# Patient Record
Sex: MALE | Race: BLACK OR AFRICAN AMERICAN | Employment: OTHER | URBAN - METROPOLITAN AREA
[De-identification: names, ages, dates, MRNs, and addresses within clinical notes are randomized per-mention and may not be internally consistent; named-entity substitution may affect disease eponyms.]

---

## 2017-01-19 ENCOUNTER — HOSPITAL ENCOUNTER (OUTPATIENT)
Dept: PHYSICAL THERAPY | Age: 77
Discharge: HOME | End: 2017-01-19
Attending: INTERNAL MEDICINE
Payer: COMMERCIAL

## 2017-01-19 DIAGNOSIS — Z91.81 HISTORY OF FALL: ICD-10-CM

## 2017-01-19 PROCEDURE — G8978 MOBILITY CURRENT STATUS: HCPCS

## 2017-01-19 PROCEDURE — G8979 MOBILITY GOAL STATUS: HCPCS

## 2017-01-19 PROCEDURE — 97163 PT EVAL HIGH COMPLEX 45 MIN: CPT

## 2017-01-19 NOTE — PROGRESS NOTES
Ambulatory/Rehab Services H2 Model Falls Risk Assessment    Risk Factor Pts. ·   Confusion/Disorientation/Impulsivity  [x]    4 ·   Symptomatic Depression  []   2 ·   Altered Elimination  []   1 ·   Dizziness/Vertigo  []   1 ·   Gender (Male)  [x]   1 ·   Any administered antiepileptics (anticonvulsants):  []   2 ·   Any administered benzodiazepines:  []   1 ·   Visual Impairment (specify):  []   1 ·   Portable Oxygen Use  []   1 ·   Orthostatic ? BP  []   1 ·   History of Recent Falls (within 3 mos.)  [x]   5     Ability to Rise from Chair (choose one) Pts. ·   Ability to rise in a single movement  []   0 ·   Pushes up, successful in one attempt  []   1 ·   Multiple attempts, but successful  []   3 ·   Unable to rise without assistance  []   4   Total: (5 or greater = High Risk) 10     Falls Prevention Plan:   []                Physical Limitations to Exercise (specify):   []                Mobility Assistance Device (type):   []                Exercise/Equipment Adaptation (specify):    ©2010 [facility] Patent #[id number].  Federal Law prohibits the replication, distribution or use without written permission from [facility]

## 2017-01-19 NOTE — PROGRESS NOTES
Andrew Bowling. : 1940 [facility] [place] [address] [facility]  [phone]   [phone]          OUTPATIENT PHYSICAL THERAPY:Initial Assessment, Treatment Day: Day of Assessment and AM 2017    ICD-10: Treatment Diagnosis: Repeated falls (R29.6)  Unsteadiness on feet (R26.81)  Unspecified lack of coordination (R27.9)  Precautions/Allergies:   Pcn [penicillins] Nitroglycerin with pt at all times - CP maybe every 2 weeks with strenuous walking or activity. Fall Risk Score: 10 (? 5 = High Risk)  MD Orders: Outpatient PT referral MEDICAL/REFERRING DIAGNOSIS:  History of fall [Z91.81]   DATE OF ONSET: Few months  REFERRING PHYSICIAN: Lerry Gaucher, MD  RETURN PHYSICIAN APPOINTMENT: unknown  DATE OF PROGRESS NOTE:    RECERTIFICATION DATE: 6/10/80     INITIAL ASSESSMENT:  Mr. Laurent Ruffin presents with increased instance of falling, maybe 2 per month. Pt reports a fall when he was leaning over and one going down steps without lights on which is more of an accident vs balance loss but does speak to decreased sensory awareness with vision removed. Pt has multiple medical problems contributing to his deficits such as his diabetes, PAD with pain in legs walking 50 - 100', CAD with ~biweekly chest pain needing Nitroglycerin. 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 Pt show decreased command following and motor processing. Pt's biggest complaint to me are his hands - he states he can be holding something and just drop it without knowing. Pt will benefit from OT consult for this. Pt presents with gait and balance deficit and functional hip weakness. Pt will benefit from PT to maximize ability and safety with mobility    PROBLEM LIST (Impacting functional limitations):  1. Decreased Strength  2. Decreased ADL/Functional Activities  3. Decreased Transfer Abilities  4.  Decreased Ambulation Ability/Technique  5. Decreased Balance  6. Increased Pain  7. Decreased Activity Tolerance  8. Increased Fatigue  9. Increased Shortness of Breath  10. Decreased Flexibility/Joint Mobility  11. Decreased Knowledge of Precautions  12. Decreased [place] with Home Exercise Program  13. Decreased Cognition INTERVENTIONS PLANNED:  1. Balance Exercise  2. Gait Training  3. Home Exercise Program (HEP)  4. Neuromuscular Re-education/Strengthening  5. Range of Motion (ROM)  6. Therapeutic Activites  7. Therapeutic Exercise/Strengthening  8. Transfer Training  9. possible orthotic consult   TREATMENT PLAN:  Effective Dates: 1/19/17 TO 4/14/17. Frequency/Duration: 2 times a week as able for this patient. (He rarely knows his work schedule more than a couple of day in advance and can be needed to drive nearly any day of the week) for 12 weeks  GOALS: (Goals have been discussed and agreed upon with patient.)  Short-Term Functional Goals: Time Frame: 4 weeks  1. Pt will be independent with range of motion, strength and balance home exercise program.  Discharge Goals: Time Frame: 8 weeks  Pt will show increased range of motion and improved posture to allow for improved safety and ability with all functional mobility. Pt will decrease TUG score indicating more normalized gait pattern and decrease risk for falls. Pt will increase Alexis Balance Scale to 48/56 indicating increased balance and decreased risk for falls. Pt will increase Dynamic Gait Index to 19/24 indicating increase gait balance and decreased risk for falls. Pt will be able to ambulate increased community distances with least restrictive assistive device independent and safe as evidenced by increased distance on the 6 minute walk test.    Rehabilitation Potential For Stated Goals: Fair  Regarding Marcello Burns Jr.'s therapy, I certify that the treatment plan above will be carried out by a therapist or under their direction.   Thank you for this referral,  Nolvia Amaya PT     Referring Physician Signature: Prashant James MD              Date                    HISTORY:   GOAL:  \"To get my fingers working better. Like to walk better. Present Symptoms:    Falling 1 - 2 months - Kind of stoop over and put a tag on the car and got right back up. Sometimes I get dizzy when my sugar is low. Check it twice a day. Going down the sairs and I think I tripped. 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 Can drive 4 - 5 hours one way and then come back. PAIN:  Legs sometimes hurt. Now: 0/10. When i walk about 50 - 100' I have to sit and rest 20 minutes for SOB and leg pain - 8/10  History of Present Injury/Illness (Reason for Referral):    Past Medical History/Comorbidities:   Mr. Grayson Justin  has a past medical history of Abdominal distension (11/5/2013); Acute gout (11/5/2013); Anemia; Anxiety (5/1/2013); Anxiety and depression (5/1/2013); BPH (benign prostatic hyperplasia) (11/5/2013); Bronchitis (11/5/2013); CAD (coronary artery disease) (11/5/2013); CAD (coronary artery disease) (11/5/2013); Carcinoma, lung ([facility] [address] (8/10/2015); Chronic kidney disease; CKD (chronic kidney disease) (11/5/2013); Conjunctivitis (11/5/2013); DEMENTIA; Depression; Diabetes ([facility] [facility] Diabetes mellitus ([facility] [address] (5/1/2013); DJD (degenerative joint disease) (11/5/2013); Fatty liver (11/7/2013); Fatty liver (11/7/2013); Gall stone (11/5/2013); GERD (gastroesophageal reflux disease) (11/5/2013); GERD (gastroesophageal reflux disease) (11/5/2013); Hepatomegaly (11/5/2013); Hepatomegaly (11/5/2013); Hepatomegaly (11/5/2013); History of GI tumor (11/5/2013); Hypercholesterolemia; Hypertension; Hypoglycemia (11/5/2013); Hypoglycemia (11/5/2013); Insomnia (11/5/2013); Noncompliance (11/5/2013); [facility]ipheral [facility] (11/5/2013);  Persistent disorder of initiating or maintaining sleep (2/10/2015); Psychiatric disorder; and Urinary frequency (11/5/2013). He also has no past medical history of Asthma; Autoimmune disease ([facility] [address] Chronic obstructive pulmonary disease ([facility] [address] Congestive heart failure, unspecified; COPD; Heart failure ([facility] [facility] Other ill-defined conditions(799.89); PUD (peptic ulcer disease); [facility]izures [facility] Stroke [facility]); Thromboembolus ([facility] or Thyroid disease. Mr. Gaby Montano  has a past surgical history that includes colonoscopy; abdomen surgery proc unlisted; and vascular surgery procedure unlist (Right, 2/3/15). Social History/Living Environment:       Social History     Social History    Marital status:      Spouse name: N/A    Number of children: N/A    Years of education: N/A     Occupational History    Not on file. 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 , Disp: 90 Tab, Rfl: 3    furosemide (LASIX) 40 mg tablet, One daily, Disp: 90 Tab, Rfl: 3    potassium chloride (KLOR-CON M20) 20 mEq tablet, Take 1 Tab by mouth daily. , Disp: 90 Tab, Rfl: 4    buPROPion XL (WELLBUTRIN XL) 300 mg XL tablet, Take 1 Tab by mouth daily. , Disp: 90 Tab, Rfl: 3    doxepin (SINEQUAN) 75 mg capsule, Take 1 Cap by mouth nightly., Disp: 90 Cap, Rfl: 3    Ranolazine 1,000 mg Tb12, Take 1,000 mg by mouth two (2) times a day., Disp: , Rfl:     pantoprazole (PROTONIX) 40 mg tablet, TAKE ONE (1) TABLET(S) ONCE DAILY, Disp: 90 Tab, Rfl: 3    insulin glargine (LANTUS) 100 unit/mL injection, by SubCUTAneous route nightly., Disp: , Rfl:     insulin lispro (HUMALOG) 100 unit/mL injection, by SubCUTAneous route., Disp: , Rfl:     cpap machine kit, by Does Not Apply route. autopap 5-20, Disp: , Rfl:     hydrALAZINE (APRESOLINE) 25 mg tablet, Take 25 mg by mouth three (3) times daily. , Disp: , Rfl:     PROAIR HFA 90 mcg/actuation inhaler, , Disp: , Rfl:     ONE TOUCH ULTRA TEST strip, , Disp: , Rfl:     SPIRIVA WITH HANDIHALER 18 mcg inhalation capsule, , Disp: , Rfl:     losartan (COZAAR) 100 mg tablet, Take 100 mg by mouth daily. , Disp: , Rfl:     NEEDLES, INSULIN DISPOSABLE (BD ULTRA-FINE JOSE PEN NEEDLES), by Does Not Apply route., Disp: , Rfl:     nitroglycerin (NITROSTAT) 0.4 mg SL tablet, by SubLINGual route every five (5) minutes as needed for Chest Pain., Disp: , Rfl:     isosorbide mononitrate ER (IMDUR) 30 mg tablet, Take 120 mg by mouth daily. , Disp: , Rfl:     tamsulosin (FLOMAX) 0.4 mg capsule, Take 0.4 mg by mouth daily. , Disp: , Rfl:     ASPIR-81 81 mg TbEC, take 81 mg by mouth daily. , Disp: , Rfl:    Date Last Reviewed:  1/19/2017     Number of Personal Factors/Comorbidities that affect the Plan of Care: 3+: HIGH COMPLEXITY   EXAMINATION:   ROM:          WNL except some tightness in calves right greater than left  Strength:          Good except decreased power in left DF.     Functional Mobility:         Gait/Ambulation:  Pt ambulates without assistive device and self reports only about 50 - 100' (will try a 6 mintue walk test). Pt shows bilateral heel scuff with small steps (decreased hip and core strength). Transfers:  independent        Bed Mobility:  NT  Mental Status:          Alert and oriented x 4. Occ decreased command following and motor processing. Impulsive. Vision:          Can see to drive without correction in the day. Getting glasses for his night vision. Body Structures Involved:  1. Nerves  2. Heart  3. Lungs  4. Bones  5. Joints  6. Muscles  7. Ligaments Body Functions Affected:  1. Mental  2. Sensory/Pain  3. Cardio  4. Respiratory  5. Neuromusculoskeletal  6. Movement Related Activities and Participation Affected:  1. Learning and Applying Knowledge  2. General Tasks and Demands  3. Mobility  4. Self Care  5. Domestic Life  6. Interpersonal Interactions and Relationships  7. Community, Social and [place]   Number of elements that affect the Plan of Care: 4+: HIGH COMPLEXITY   CLINICAL PRESENTATION:   Presentation: Evolving clinical presentation with unstable and unpredictable characteristics: HIGH COMPLEXITY   CLINICAL DECISION MAKING:   Outcome Measure: Tool Used: 6-Minute Walk Test   Score:  Initial: TBD Feet = TBD Meters  Most Recent: TBD Feet = TBD Meters    Interpretation of Score: AGE  GENDER  MEAN  SD  NORMAL RANGE (2SD)    61-76  Male (15)   Female (22)  572   538  80   80  1-0   [phone]    66-77  Male (14)   Female (22)  527   471  80   69  46-65   [phone]    80-80  Male (8)   Female (15)  275   529  26   77  105-671   [phone]        Tool Used: Karlynn Neve Balance Scale  Score:  Initial: 41/56 Most Recent: X/56 (Date: -- )   Interpretation of Score: Each section is scored on a 0-4 scale, 0 representing the patients inability to perform the task and 4 representing independence. The scores of each section are added together for a total score of 56. The higher the patients score, the more independent the patient is.   Any score below 45 indicates increased risk for falls. Score 56 55-45 44-34 33-23 22-12 11-1 0   Modifier  CI CJ CK CL CM CN     ? Mobility - Walking and Moving Around:     - CURRENT STATUS: CJ - 20%-39% impaired, limited or restricted    - GOAL STATUS: CI - 1%-19% impaired, limited or restricted    - D/C STATUS:  ---------------To be determined---------------    Tool Used: Dynamic Gait Index  Score:  Initial: 16/24 Most Recent: X/24 (Date: -- )   Interpretation of Score: Each section is scored on a 0-3 scale, 0 representing the patients inability to perform the task and 3 representing independence. The scores of each section are added together for a total score of 24. Any score below 19 indicates increased risk for falls. Score 24 23-19 18-15 14-10 9-5 4-1 0   Modifier  CI CJ CK CL CM CN     Tool Used: Timed Up and Go (TUG)  Score:  Initial: 12.65 seconds Most Recent: X seconds (Date: -- )   Interpretation of Score: The test measures, in seconds, the time taken by an individual to stand up from a standard arm chair (seat height 46 cm [18 in], arm height 65 cm [25.6 in]), walk a distance of 3 meters (118 in, approx 10 ft), turn, walk back to the chair and sit down. If the individual takes longer than 14 seconds to complete TUG, this indicates risk for falls. Score 7 7.5-10.5 11-14 14.5-17.5 18-21 21.5-24.5 25+   Modifier  CI CJ CK CL CM CN     Medical Necessity:   · Skilled intervention continues to be required due to [address] [facility]. Reason for Services/Other Comments:  · Patient continues to require skilled intervention due to [address] [address] DEFICIT. Use of outcome tool(s) and clinical judgement create a POC that gives a: Difficult prediction of patient's progress: HIGH COMPLEXITY   TREATMENT:   (In addition to Assessment/Re-Assessment sessions the following treatments were rendered)    ASSESSMENT ONLY    Treatment/Session Assessment:  See initial assessment above.   Patients response to todays treatment session was tolerated well with no medical complications. .  · Post session pain:  NO PAIN  · Compliance with Program/Exercises: Will assess as treatment progresses. · Recommendations/Intent for next treatment session: \"Next visit will focus on Jesus [address] EXERCISEW\".   Total Treatment Duration:  PT Patient Time In/Time Out  Time In: 1000  Time Out: [address], [place]

## 2017-01-20 ENCOUNTER — HOSPITAL ENCOUNTER (OUTPATIENT)
Dept: PHYSICAL THERAPY | Age: 77
Discharge: HOME | End: 2017-01-20
Attending: INTERNAL MEDICINE
Payer: COMMERCIAL

## 2017-01-20 PROCEDURE — 97110 THERAPEUTIC EXERCISES: CPT

## 2017-01-20 NOTE — PROGRESS NOTES
Doree Duty. : [facility] [address]  [phone]   [phone]          OUTPATIENT PHYSICAL THERAPY:Daily Note 2017    ICD-10: Treatment Diagnosis: Repeated falls (R29.6)  Unsteadiness on feet (R26.81)  Unspecified lack of coordination (R27.9)  Precautions/Allergies:   Pcn [penicillins] Nitroglycerin with pt at all times - CP maybe every 2 weeks with strenuous walking or activity. Fall Risk Score: 10 (? 5 = High Risk)  MD Orders: Outpatient PT referral MEDICAL/REFERRING DIAGNOSIS:  history of falls   DATE OF ONSET: Few months  REFERRING PHYSICIAN: Lucio Foreman MD  RETURN PHYSICIAN APPOINTMENT: unknown  DATE OF PROGRESS NOTE:    RECERTIFICATION DATE:      INITIAL ASSESSMENT:  Mr. Clarice Heard presents with increased instance of falling, maybe 2 per month. Pt reports a fall when he was leaning over and one going down steps without lights on which is more of an accident vs balance loss but does speak to decreased sensory awareness with vision removed. Pt has multiple medical problems contributing to his deficits such as his diabetes, PAD with pain in legs walking 50 - 100', CAD with ~biweekly chest pain needing Nitroglycerin. 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 Pt show decreased command following and motor processing. Pt's biggest complaint to me are his hands - he states he can be holding something and just drop it without knowing. Pt will benefit from OT consult for this. Pt presents with gait and balance deficit and functional hip weakness. Pt will benefit from PT to maximize ability and safety with mobility    PROBLEM LIST (Impacting functional limitations):  1. Decreased Strength  2. Decreased ADL/Functional Activities  3. Decreased Transfer Abilities  4. Decreased Ambulation Ability/Technique  5.  Decreased Balance  6. Increased Pain  7. Decreased Activity Tolerance  8. Increased Fatigue  9. Increased Shortness of Breath  10. Decreased Flexibility/Joint Mobility  11. Decreased Knowledge of Precautions  12. Decreased Happy with Home Exercise Program  13. Decreased Cognition INTERVENTIONS PLANNED:  1. Balance Exercise  2. Gait Training  3. Home Exercise Program (HEP)  4. Neuromuscular Re-education/Strengthening  5. Range of Motion (ROM)  6. Therapeutic Activites  7. Therapeutic Exercise/Strengthening  8. Transfer Training  9. possible orthotic consult   TREATMENT PLAN:  Effective Dates: 1/19/17 TO 4/14/17. Frequency/Duration: 2 times a week as able for this patient. (He rarely knows his work schedule more than a couple of day in advance and can be needed to drive nearly any day of the week) for 12 weeks  GOALS: (Goals have been discussed and agreed upon with patient.)  Short-Term Functional Goals: Time Frame: 4 weeks  1. Pt will be independent with range of motion, strength and balance home exercise program.  Discharge Goals: Time Frame: 8 weeks  Pt will show increased range of motion and improved posture to allow for improved safety and ability with all functional mobility. Pt will decrease TUG score indicating more normalized gait pattern and decrease risk for falls. Pt will increase Alexis Balance Scale to 48/56 indicating increased balance and decreased risk for falls. Pt will increase Dynamic Gait Index to 19/24 indicating increase gait balance and decreased risk for falls. Pt will be able to ambulate increased community distances with least restrictive assistive device independent and safe as evidenced by increased distance on the 6 minute walk test.    Rehabilitation Potential For Stated Goals: Fair  Regarding Elsie Garcia Jr.'s therapy, I certify that the treatment plan above will be carried out by a therapist or under their direction.   Thank you for this referral,  China Jordan DPT Referring Physician Signature: Isabel Oliver MD              Date                    HISTORY:   GOAL:  \"To get my fingers working better. Like to walk better. Present Symptoms:    Falling 1 - 2 months - Kind of stoop over and put a tag on the car and got right back up. Sometimes I get dizzy when my sugar is low. Check it twice a day. Going down the sairs and I think I tripped. 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 Can drive 4 - 5 hours one way and then come back. History of Present Injury/Illness (Reason for Referral):    Past Medical History/Comorbidities:   Mr. Sudeep Mancilla  has a past medical history of Abdominal distension (11/5/2013); Acute gout (11/5/2013); Anemia; Anxiety (5/1/2013); Anxiety and depression (5/1/2013); BPH (benign prostatic hyperplasia) (11/5/2013); Bronchitis (11/5/2013); CAD (coronary artery disease) (11/5/2013); CAD (coronary artery disease) (11/5/2013); Carcinoma, lung ([facility] (8/10/2015); Chronic kidney disease; CKD (chronic kidney disease) (11/5/2013); Conjunctivitis (11/5/2013); DEMENTIA; Depression; Diabetes ([facility] Diabetes mellitus ([facility] (5/1/2013); DJD (degenerative joint disease) (11/5/2013); Fatty liver (11/7/2013); Fatty liver (11/7/2013); Gall stone (11/5/2013); GERD (gastroesophageal reflux disease) (11/5/2013); GERD (gastroesophageal reflux disease) (11/5/2013); Hepatomegaly (11/5/2013); Hepatomegaly (11/5/2013); Hepatomegaly (11/5/2013); History of GI tumor (11/5/2013); Hypercholesterolemia; Hypertension; Hypoglycemia (11/5/2013); Hypoglycemia (11/5/2013); Insomnia (11/5/2013); Noncompliance (11/5/2013); PAD (peripheral artery disease) ([facility] (11/5/2013); Persistent disorder of initiating or maintaining sleep (2/10/2015); Psychiatric disorder; and Urinary frequency (11/5/2013). He also has no past medical history of Asthma; Autoimmune disease ([facility]  Chronic obstructive pulmonary disease ([facility] Congestive heart failure, unspecified; COPD; Heart failure ([facility] Other ill-defined conditions(799.89); PUD (peptic ulcer disease); [facility]izures ([facility] Stroke [facility]); Thromboembolus ([facility] or Thyroid disease. Mr. Britany Trevino  has a past surgical history that includes colonoscopy; abdomen surgery proc unlisted; and vascular surgery procedure unlist (Right, 2/3/15). Social History/Living Environment:       Social History     Social History    Marital status:      Spouse name: N/A    Number of children: N/A    Years of education: N/A     Occupational History    Not on file. 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 , Disp: 90 Tab, Rfl: 3    furosemide (LASIX) 40 mg tablet, One daily, Disp: 90 Tab, Rfl: 3    potassium chloride (KLOR-CON M20) 20 mEq tablet, Take 1 Tab by mouth daily. , Disp: 90 Tab, Rfl: 4    buPROPion XL (WELLBUTRIN XL) 300 mg XL tablet, Take 1 Tab by mouth daily. , Disp: 90 Tab, Rfl: 3    doxepin (SINEQUAN) 75 mg capsule, Take 1 Cap by mouth nightly., Disp: 90 Cap, Rfl: 3    Ranolazine 1,000 mg Tb12, Take 1,000 mg by mouth two (2) times a day., Disp: , Rfl:     pantoprazole (PROTONIX) 40 mg tablet, TAKE ONE (1) TABLET(S) ONCE DAILY, Disp: 90 Tab, Rfl: 3    insulin glargine (LANTUS) 100 unit/mL injection, by SubCUTAneous route nightly., Disp: , Rfl:     insulin lispro (HUMALOG) 100 unit/mL injection, by SubCUTAneous route., Disp: , Rfl:     cpap machine kit, by Does Not Apply route. autopap 5-20, Disp: , Rfl:     hydrALAZINE (APRESOLINE) 25 mg tablet, Take 25 mg by mouth three (3) times daily. , Disp: , Rfl:     PROAIR HFA 90 mcg/actuation inhaler, , Disp: , Rfl:     ONE TOUCH ULTRA TEST strip, , Disp: , Rfl:     SPIRIVA WITH HANDIHALER 18 mcg inhalation capsule, , Disp: , Rfl:     losartan (COZAAR) 100 mg tablet, Take 100 mg by mouth daily. , Disp: , Rfl:     NEEDLES, INSULIN DISPOSABLE (BD ULTRA-FINE JOSE PEN NEEDLES), by Does Not Apply route., Disp: , Rfl:     nitroglycerin (NITROSTAT) 0.4 mg SL tablet, by SubLINGual route every five (5) minutes as needed for Chest Pain., Disp: , Rfl:     isosorbide mononitrate ER (IMDUR) 30 mg tablet, Take 120 mg by mouth daily. , Disp: , Rfl:     tamsulosin (FLOMAX) 0.4 mg capsule, Take 0.4 mg by mouth daily. , Disp: , Rfl:     ASPIR-81 81 mg TbEC, take 81 mg by mouth daily. , Disp: , Rfl:    Date Last Reviewed:  1/20/2017     Number of Personal Factors/Comorbidities that affect the Plan of Care: 3+: HIGH COMPLEXITY   EXAMINATION:   ROM:          WNL except some tightness in calves right greater than left  Strength:          Good except decreased power in left DF. Functional Mobility:         Gait/Ambulation:  Pt ambulates without assistive device and self reports only about 50 - 100' (will try a 6 mintue walk test). Pt shows bilateral heel scuff with small steps (decreased hip and core strength).         Transfers: independent        Bed Mobility:  NT  Mental Status:          Alert and oriented x 4. Occ decreased command following and motor processing. Impulsive. Vision:          Can see to drive without correction in the day. Getting glasses for his night vision. Body Structures Involved:  1. Nerves  2. Heart  3. Lungs  4. Bones  5. Joints  6. Muscles  7. Ligaments Body Functions Affected:  1. Mental  2. Sensory/Pain  3. Cardio  4. Respiratory  5. Neuromusculoskeletal  6. Movement Related Activities and Participation Affected:  1. Learning and Applying Knowledge  2. General Tasks and Demands  3. Mobility  4. Self Care  5. Domestic Life  6. Interpersonal Interactions and Relationships  7. Community, Social and [place]   Number of elements that affect the Plan of Care: 4+: HIGH COMPLEXITY   CLINICAL PRESENTATION:   Presentation: Evolving clinical presentation with unstable and unpredictable characteristics: HIGH COMPLEXITY   CLINICAL DECISION MAKING:   Outcome Measure: Tool Used: 6-Minute Walk Test   Score:  Initial: 495 Feet = TBD Meters  3 seated rest breaks  Most Recent: TBD Feet = TBD Meters    Interpretation of Score: AGE  GENDER  MEAN  SD  NORMAL RANGE (2SD)    61-76  Male (15)   Female (22)  572   538  80   80  1-0   [phone]    66-77  Male (14)   Female (22)  527   471  80   69  46-65   [phone]    80-80  Male (8)   Female (15)  853   985  39   77  [phone]   [id number]        Tool Used: Di Russian Balance Scale  Score:  Initial: 41/56 Most Recent: X/56 (Date: -- )   Interpretation of Score: Each section is scored on a 0-4 scale, 0 representing the patients inability to perform the task and 4 representing independence. The scores of each section are added together for a total score of 56. The higher the patients score, the more independent the patient is. Any score below 45 indicates increased risk for falls. Score 56 55-45 44-34 33-23 22-12 11-1 0   Modifier CH CI CJ CK CL CM CN     ?  Mobility - Walking and Moving Around:     - CURRENT STATUS: CJ - 20%-39% impaired, limited or restricted    - GOAL STATUS: CI - 1%-19% impaired, limited or restricted    - D/C STATUS:  ---------------To be determined---------------    Tool Used: Dynamic Gait Index  Score:  Initial: 16/24 Most Recent: X/24 (Date: -- )   Interpretation of Score: Each section is scored on a 0-3 scale, 0 representing the patients inability to perform the task and 3 representing independence. The scores of each section are added together for a total score of 24. Any score below 19 indicates increased risk for falls. Score 24 23-19 18-15 14-10 9-5 4-1 0   Modifier  CI CJ CK CL CM CN     Tool Used: Timed Up and Go (TUG)  Score:  Initial: 12.65 seconds Most Recent: X seconds (Date: -- )   Interpretation of Score: The test measures, in seconds, the time taken by an individual to stand up from a standard arm chair (seat height 46 cm [18 in], arm height 65 cm [25.6 in]), walk a distance of 3 meters (118 in, approx 10 ft), turn, walk back to the chair and sit down. If the individual takes longer than 14 seconds to complete TUG, this indicates risk for falls. Score 7 7.5-10.5 11-14 14.5-17.5 18-21 21.5-24.5 25+   Modifier  CI CJ CK CL CM CN     Medical Necessity:   · Skilled intervention continues to be required due to [facility]. Reason for Services/Other Comments:  · Patient continues to require skilled intervention due to [id number] [address] DEFICIT. Use of outcome tool(s) and clinical judgement create a POC that gives a: Difficult prediction of patient's progress: HIGH COMPLEXITY   TREATMENT:   (In addition to Assessment/Re-Assessment sessions the following treatments were rendered)    Therapeutic Exercise: ( 40 minutes):  Exercises per grid below to improve mobility, strength and balance.   Required moderate visual, verbal and tactile cues to promote proper body alignment and promote proper body mechanics. Progressed complexity of movement as indicated. 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 No pain. Treatment/Session Assessment:  Patient requires lots of verbal and tactile cues to perform exercises correctly. Frequent rest breaks needed. He continues to benefit from skilled PT to improve his mobility and balance. Patients response to todays treatment session was tolerated well with no medical complications. .  · Post session pain:  NO PAIN  · Compliance with Program/Exercises: Will assess as treatment progresses. · Recommendations/Intent for next treatment session: \"Next visit will focus on [address] EXERCISEW\".   Total Treatment Duration:  PT Patient Time In/Time Out  Time In: 1430  Time Out: [id number], DPT

## 2017-01-26 ENCOUNTER — HOSPITAL ENCOUNTER (OUTPATIENT)
Dept: PHYSICAL THERAPY | Age: 77
Discharge: HOME | End: 2017-01-26
Attending: INTERNAL MEDICINE
Payer: COMMERCIAL

## 2017-01-26 PROCEDURE — 97110 THERAPEUTIC EXERCISES: CPT

## 2017-01-26 NOTE — PROGRESS NOTES
Joslynzoie Colon. : 1940 [facility] [address] [address] [facility]  [phone]   [phone]          OUTPATIENT PHYSICAL THERAPY:Daily Note 2017    ICD-10: Treatment Diagnosis: Repeated falls (R29.6)  Unsteadiness on feet (R26.81)  Unspecified lack of coordination (R27.9)  Precautions/Allergies:   Pcn [penicillins] Nitroglycerin with pt at all times - CP maybe every 2 weeks with strenuous walking or activity. Fall Risk Score: 10 (? 5 = High Risk)  MD Orders: Outpatient PT referral MEDICAL/REFERRING DIAGNOSIS:  history of falls   DATE OF ONSET: Few months  REFERRING PHYSICIAN: Bethany Schumacher MD  RETURN PHYSICIAN APPOINTMENT: unknown  DATE OF PROGRESS NOTE:    RECERTIFICATION DATE:      INITIAL ASSESSMENT:  Mr. Antony Class presents with increased instance of falling, maybe 2 per month. Pt reports a fall when he was leaning over and one going down steps without lights on which is more of an accident vs balance loss but does speak to decreased sensory awareness with vision removed. Pt has multiple medical problems contributing to his deficits such as his diabetes, PAD with pain in legs walking 50 - 100', CAD with ~biweekly chest pain needing Nitroglycerin. 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 Pt show decreased command following and motor processing. Pt's biggest complaint to me are his hands - he states he can be holding something and just drop it without knowing. Pt will benefit from OT consult for this. Pt presents with gait and balance deficit and functional hip weakness. Pt will benefit from PT to maximize ability and safety with mobility    PROBLEM LIST (Impacting functional limitations):  1. Decreased Strength  2. Decreased ADL/Functional Activities  3. Decreased Transfer Abilities  4. Decreased Ambulation Ability/Technique  5.  Decreased Balance  6. Increased Pain  7. Decreased Activity Tolerance  8. Increased Fatigue  9. Increased Shortness of Breath  10. Decreased Flexibility/Joint Mobility  11. Decreased Knowledge of Precautions  12. Decreased [place] with Home Exercise Program  13. Decreased Cognition INTERVENTIONS PLANNED:  1. Balance Exercise  2. Gait Training  3. Home Exercise Program (HEP)  4. Neuromuscular Re-education/Strengthening  5. Range of Motion (ROM)  6. Therapeutic Activites  7. Therapeutic Exercise/Strengthening  8. Transfer Training  9. possible orthotic consult   TREATMENT PLAN:  Effective Dates: 1/19/17 TO 4/14/17. Frequency/Duration: 2 times a week as able for this patient. (He rarely knows his work schedule more than a couple of day in advance and can be needed to drive nearly any day of the week) for 12 weeks  GOALS: (Goals have been discussed and agreed upon with patient.)  Short-Term Functional Goals: Time Frame: 4 weeks  1. Pt will be independent with range of motion, strength and balance home exercise program.  Discharge Goals: Time Frame: 8 weeks  Pt will show increased range of motion and improved posture to allow for improved safety and ability with all functional mobility. Pt will decrease TUG score indicating more normalized gait pattern and decrease risk for falls. Pt will increase Alexis Balance Scale to 48/56 indicating increased balance and decreased risk for falls. Pt will increase Dynamic Gait Index to 19/24 indicating increase gait balance and decreased risk for falls. Pt will be able to ambulate increased community distances with least restrictive assistive device independent and safe as evidenced by increased distance on the 6 minute walk test.    Rehabilitation Potential For Stated Goals: Fair  Regarding Jerman Ferrer Jr.'s therapy, I certify that the treatment plan above will be carried out by a therapist or under their direction.   Thank you for this referral,  Silvina Hatfield, PT Referring Physician Signature: Humera Schneider MD              Date                    HISTORY:   GOAL:  \"To get my fingers working better. Like to walk better. Present Symptoms:    Falling 1 - 2 months - Kind of stoop over and put a tag on the car and got right back up. Sometimes I get dizzy when my sugar is low. Check it twice a day. Going down the sairs and I think I tripped. 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 Can drive 4 - 5 hours one way and then come back. History of Present Injury/Illness (Reason for Referral):    Past Medical History/Comorbidities:   Mr. Tiara Caballero  has a past medical history of Abdominal distension (11/5/2013); Acute gout (11/5/2013); Anemia; Anxiety (5/1/2013); Anxiety and depression (5/1/2013); BPH (benign prostatic hyperplasia) (11/5/2013); Bronchitis (11/5/2013); CAD (coronary artery disease) (11/5/2013); CAD (coronary artery disease) (11/5/2013); Carcinoma, lung ([facility] (8/10/2015); Chronic kidney disease; CKD (chronic kidney disease) (11/5/2013); Conjunctivitis (11/5/2013); DEMENTIA; Depression; Diabetes ([facility] Diabetes mellitus ([facility] (5/1/2013); DJD (degenerative joint disease) (11/5/2013); Fatty liver (11/7/2013); Fatty liver (11/7/2013); Gall stone (11/5/2013); GERD (gastroesophageal reflux disease) (11/5/2013); GERD (gastroesophageal reflux disease) (11/5/2013); Hepatomegaly (11/5/2013); Hepatomegaly (11/5/2013); Hepatomegaly (11/5/2013); History of GI tumor (11/5/2013); Hypercholesterolemia; Hypertension; Hypoglycemia (11/5/2013); Hypoglycemia (11/5/2013); Insomnia (11/5/2013); Noncompliance (11/5/2013); PAD (peripheral artery disease) ([facility] (11/5/2013); Persistent disorder of initiating or maintaining sleep (2/10/2015); Psychiatric disorder; and Urinary frequency (11/5/2013). He also has no past medical history of Asthma; Autoimmune disease ([facility]  Chronic obstructive pulmonary disease ([facility] [facility] Congestive heart failure, unspecified; COPD; Heart failure ([facility] Other ill-defined conditions(799.89); PUD (peptic ulcer disease); [facility] Stroke [facility]); Thromboembolus ([facility] or Thyroid disease. Mr. Gertrude Guy  has a past surgical history that includes colonoscopy; abdomen surgery proc unlisted; and vascular surgery procedure unlist (Right, 2/3/15). Social History/Living Environment:       Social History     Social History    Marital status:      Spouse name: N/A    Number of children: N/A    Years of education: N/A     Occupational History    Not on file. 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 , Disp: 90 Tab, Rfl: 3    furosemide (LASIX) 40 mg tablet, One daily, Disp: 90 Tab, Rfl: 3    potassium chloride (KLOR-CON M20) 20 mEq tablet, Take 1 Tab by mouth daily. , Disp: 90 Tab, Rfl: 4    buPROPion XL (WELLBUTRIN XL) 300 mg XL tablet, Take 1 Tab by mouth daily. , Disp: 90 Tab, Rfl: 3    doxepin (SINEQUAN) 75 mg capsule, Take 1 Cap by mouth nightly., Disp: 90 Cap, Rfl: 3    Ranolazine 1,000 mg Tb12, Take 1,000 mg by mouth two (2) times a day., Disp: , Rfl:     pantoprazole (PROTONIX) 40 mg tablet, TAKE ONE (1) TABLET(S) ONCE DAILY, Disp: 90 Tab, Rfl: 3    insulin glargine (LANTUS) 100 unit/mL injection, by SubCUTAneous route nightly., Disp: , Rfl:     insulin lispro (HUMALOG) 100 unit/mL injection, by SubCUTAneous route., Disp: , Rfl:     cpap machine kit, by Does Not Apply route. autopap 5-20, Disp: , Rfl:     hydrALAZINE (APRESOLINE) 25 mg tablet, Take 25 mg by mouth three (3) times daily. , Disp: , Rfl:     PROAIR HFA 90 mcg/actuation inhaler, , Disp: , Rfl:     ONE TOUCH ULTRA TEST strip, , Disp: , Rfl:     SPIRIVA WITH HANDIHALER 18 mcg inhalation capsule, , Disp: , Rfl:     losartan (COZAAR) 100 mg tablet, Take 100 mg by mouth daily. , Disp: , Rfl:     NEEDLES, INSULIN DISPOSABLE (BD ULTRA-FINE JOSE PEN NEEDLES), by Does Not Apply route., Disp: , Rfl:     nitroglycerin (NITROSTAT) 0.4 mg SL tablet, by SubLINGual route every five (5) minutes as needed for Chest Pain., Disp: , Rfl:     isosorbide mononitrate ER (IMDUR) 30 mg tablet, Take 120 mg by mouth daily. , Disp: , Rfl:     tamsulosin (FLOMAX) 0.4 mg capsule, Take 0.4 mg by mouth daily. , Disp: , Rfl:     ASPIR-81 81 mg TbEC, take 81 mg by mouth daily. , Disp: , Rfl:    Date Last Reviewed:  1/26/2017     Number of Personal Factors/Comorbidities that affect the Plan of Care: 3+: HIGH COMPLEXITY   EXAMINATION:   ROM:          WNL except some tightness in calves right greater than left  Strength:          Good except decreased power in left DF. Functional Mobility:         Gait/Ambulation:  Pt ambulates without assistive device and self reports only about 50 - 100' (will try a 6 mintue walk test). Pt shows bilateral heel scuff with small steps (decreased hip and core strength).         Transfers: independent        Bed Mobility:  NT  Mental Status:          Alert and oriented x 4. Occ decreased command following and motor processing. Impulsive. Vision:          Can see to drive without correction in the day. Getting glasses for his night vision. Body Structures Involved:  1. Nerves  2. Heart  3. Lungs  4. Bones  5. Joints  6. Muscles  7. Ligaments Body Functions Affected:  1. Mental  2. Sensory/Pain  3. Cardio  4. Respiratory  5. Neuromusculoskeletal  6. Movement Related Activities and Participation Affected:  1. Learning and Applying Knowledge  2. General Tasks and Demands  3. Mobility  4. Self Care  5. Domestic Life  6. Interpersonal Interactions and Relationships  7. Community, Social and [place]   Number of elements that affect the Plan of Care: 4+: HIGH COMPLEXITY   CLINICAL PRESENTATION:   Presentation: Evolving clinical presentation with unstable and unpredictable characteristics: HIGH COMPLEXITY   CLINICAL DECISION MAKING:   Outcome Measure: Tool Used: 6-Minute Walk Test   Score:  Initial: 495 Feet = TBD Meters  3 seated rest breaks  Most Recent: TBD Feet = TBD Meters    Interpretation of Score: AGE  GENDER  MEAN  SD  NORMAL RANGE (2SD)    61-76  Male (15)   Female (22)  572   538  80   80  1-0   [phone]    66-77  Male (14)   Female (22)  527   471  80   69  46-65   321-621    80-80  Male (8)   Female (15)  489   514  62   12  218-914   222-562        Tool Used: Celia Gianni Balance Scale  Score:  Initial: 41/56 Most Recent: X/56 (Date: -- )   Interpretation of Score: Each section is scored on a 0-4 scale, 0 representing the patients inability to perform the task and 4 representing independence. The scores of each section are added together for a total score of 56. The higher the patients score, the more independent the patient is. Any score below 45 indicates increased risk for falls. Score 56 55-45 44-34 33-23 22-12 11-1 0   Modifier CH CI CJ CK CL CM CN     ?  Mobility - Walking and Moving Around:     - CURRENT STATUS: CJ - 20%-39% impaired, limited or restricted    - GOAL STATUS: CI - 1%-19% impaired, limited or restricted    - D/C STATUS:  ---------------To be determined---------------    Tool Used: Dynamic Gait Index  Score:  Initial: 16/24 Most Recent: X/24 (Date: -- )   Interpretation of Score: Each section is scored on a 0-3 scale, 0 representing the patients inability to perform the task and 3 representing independence. The scores of each section are added together for a total score of 24. Any score below 19 indicates increased risk for falls. Score 24 23-19 18-15 14-10 9-5 4-1 0   Modifier CH CI CJ CK CL CM CN     Tool Used: Timed Up and Go (TUG)  Score:  Initial: 12.65 seconds Most Recent: X seconds (Date: -- )   Interpretation of Score: The test measures, in seconds, the time taken by an individual to stand up from a standard arm chair (seat height 46 cm [18 in], arm height 65 cm [25.6 in]), walk a distance of 3 meters (118 in, approx 10 ft), turn, walk back to the chair and sit down. If the individual takes longer than 14 seconds to complete TUG, this indicates risk for falls. Score 7 7.5-10.5 11-14 14.5-17.5 18-21 21.5-24.5 25+   Modifier CH CI CJ CK CL CM CN     Medical Necessity:   · Skilled intervention continues to be required due to [facility]. Reason for Services/Other Comments:  · Patient continues to require skilled intervention due to [id number] [address] DEFICIT. Use of outcome tool(s) and clinical judgement create a POC that gives a: Difficult prediction of patient's progress: HIGH COMPLEXITY      S:  Well I haven't done them. I have been out of town. I feel a lot better. I can walk better. \"      TREATMENT:   (In addition to Assessment/Re-Assessment sessions the following treatments were rendered)    Therapeutic Exercise: ( 40 minutes):  Exercises per grid below to improve mobility, strength and balance.   Required moderate visual, verbal and tactile cues to promote proper body alignment and promote proper body mechanics. Progressed complexity of movement as indicated. Date:  1/19/17 Date:  1/26/17   Date:     Activity/Exercise Parameters Parameters Parameters   Heel raises 3 x 10 reps - patient bending knees and then pushing up into heel raise despite cuing  Standing heel toe raises - at rail x 20    Toe raises 2 x 10 reps with bouncing  Above    Standing lateral foot raises  X20. Good ability    Hip circles 2 x 10 reps each direction  Very difficulty. Pt rotating at hips and ankles vs full [place] around feet. 10x each directions. Seated ankle pumps X 20 reps  x20     Lateral ankle rocking  2 x 10 reps  As above    Standing hip extension  2 x 10' - very poor form with visual, verbal, tactile cues  2 x 10. Instructed to perform like a leg swing and to keep the knee straight. Better form    Standing hip abduction  2 x 10. Excellent on the right. Left uncoordinated. Calf stretch on incline board 2 x 30 sec hold  3 x 30 seconds      Pre-Treatment Symptoms: Just got back this morning. No pain. Treatment/Session Assessment:  Stated his legs and feet feel better after therapy. Following commands better today with good technique overall. Steadier gait upon entering therapy. He continues to benefit from skilled PT to improve his mobility and balance. Patients response to todays treatment session was tolerated well with no medical complications. .  · Post session pain:  NO PAIN  · Compliance with Program/Exercises: Will assess as treatment progresses. · Recommendations/Intent for next treatment session: \"Next visit will focus on progressive high level balance and LE sensorimotor awareness stretches and exercises. \".   Total Treatment Duration:  PT Patient Time In/Time Out  Time In: 1400  Time Out: [address], PT

## 2017-01-30 ENCOUNTER — HOSPITAL ENCOUNTER (OUTPATIENT)
Dept: PHYSICAL THERAPY | Age: 77
Discharge: HOME | End: 2017-01-30
Attending: INTERNAL MEDICINE
Payer: COMMERCIAL

## 2017-01-30 PROCEDURE — 97110 THERAPEUTIC EXERCISES: CPT

## 2017-01-30 NOTE — PROGRESS NOTES
Patricia Connors. : [facility] [address]  [phone]   [phone]          OUTPATIENT PHYSICAL THERAPY:Daily Note 2017    ICD-10: Treatment Diagnosis: Repeated falls (R29.6)  Unsteadiness on feet (R26.81)  Unspecified lack of coordination (R27.9)  Precautions/Allergies:   Pcn [penicillins] Nitroglycerin with pt at all times - CP maybe every 2 weeks with strenuous walking or activity. Fall Risk Score: 10 (? 5 = High Risk)  MD Orders: Outpatient PT referral MEDICAL/REFERRING DIAGNOSIS:  history of falls   DATE OF ONSET: Few months  REFERRING PHYSICIAN: Jarocho Lazcano MD  RETURN PHYSICIAN APPOINTMENT: unknown  DATE OF PROGRESS NOTE:  14  RECERTIFICATION DATE: 88     INITIAL ASSESSMENT:  Mr. Joella Osler presents with increased instance of falling, maybe 2 per month. Pt reports a fall when he was leaning over and one going down steps without lights on which is more of an accident vs balance loss but does speak to decreased sensory awareness with vision removed. Pt has multiple medical problems contributing to his deficits such as his diabetes, PAD with pain in legs walking 50 - 100', CAD with ~biweekly chest pain needing Nitroglycerin. 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 Pt show decreased command following and motor processing. Pt's biggest complaint to me are his hands - he states he can be holding something and just drop it without knowing. Pt will benefit from OT consult for this. Pt presents with gait and balance deficit and functional hip weakness. Pt will benefit from PT to maximize ability and safety with mobility    PROBLEM LIST (Impacting functional limitations):  1. Decreased Strength  2. Decreased ADL/Functional Activities  3. Decreased Transfer Abilities  4. Decreased Ambulation Ability/Technique  5.  Decreased Balance  6. Increased Pain  7. Decreased Activity Tolerance  8. Increased Fatigue  9. Increased Shortness of Breath  10. Decreased Flexibility/Joint Mobility  11. Decreased Knowledge of Precautions  12. Decreased Haakon with Home Exercise Program  13. Decreased Cognition INTERVENTIONS PLANNED:  1. Balance Exercise  2. Gait Training  3. Home Exercise Program (HEP)  4. Neuromuscular Re-education/Strengthening  5. Range of Motion (ROM)  6. Therapeutic Activites  7. Therapeutic Exercise/Strengthening  8. Transfer Training  9. possible orthotic consult   TREATMENT PLAN:  Effective Dates: 1/19/17 TO 4/14/17. Frequency/Duration: 2 times a week as able for this patient. (He rarely knows his work schedule more than a couple of day in advance and can be needed to drive nearly any day of the week) for 12 weeks  GOALS: (Goals have been discussed and agreed upon with patient.)  Short-Term Functional Goals: Time Frame: 4 weeks  1. Pt will be independent with range of motion, strength and balance home exercise program.  Discharge Goals: Time Frame: 8 weeks  Pt will show increased range of motion and improved posture to allow for improved safety and ability with all functional mobility. Pt will decrease TUG score indicating more normalized gait pattern and decrease risk for falls. Pt will increase Alexis Balance Scale to 48/56 indicating increased balance and decreased risk for falls. Pt will increase Dynamic Gait Index to 19/24 indicating increase gait balance and decreased risk for falls. Pt will be able to ambulate increased community distances with least restrictive assistive device independent and safe as evidenced by increased distance on the 6 minute walk test.    Rehabilitation Potential For Stated Goals: Fair  Regarding Jorge A Price Jr.'s therapy, I certify that the treatment plan above will be carried out by a therapist or under their direction.   Thank you for this referral,  Kolton Nelson, PT Referring Physician Signature: Minnie Koyanagi, MD              Date                    HISTORY:   GOAL:  \"To get my fingers working better. Like to walk better. Present Symptoms:    Falling 1 - 2 months - Kind of stoop over and put a tag on the car and got right back up. Sometimes I get dizzy when my sugar is low. Check it twice a day. Going down the sairs and I think I tripped. 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 Can drive 4 - 5 hours one way and then come back. History of Present Injury/Illness (Reason for Referral):    Past Medical History/Comorbidities:   Mr. Criss Ha  has a past medical history of Abdominal distension (11/5/2013); Acute gout (11/5/2013); Anemia; Anxiety (5/1/2013); Anxiety and depression (5/1/2013); BPH (benign prostatic hyperplasia) (11/5/2013); Bronchitis (11/5/2013); CAD (coronary artery disease) (11/5/2013); CAD (coronary artery disease) (11/5/2013); Carcinoma, lung ([facility] (8/10/2015); Chronic kidney disease; CKD (chronic kidney disease) (11/5/2013); Conjunctivitis (11/5/2013); DEMENTIA; Depression; Diabetes ([address] Diabetes mellitus ([facility] (5/1/2013); DJD (degenerative joint disease) (11/5/2013); Fatty liver (11/7/2013); Fatty liver (11/7/2013); Gall stone (11/5/2013); GERD (gastroesophageal reflux disease) (11/5/2013); GERD (gastroesophageal reflux disease) (11/5/2013); Hepatomegaly (11/5/2013); Hepatomegaly (11/5/2013); Hepatomegaly (11/5/2013); History of GI tumor (11/5/2013); Hypercholesterolemia; Hypertension; Hypoglycemia (11/5/2013); Hypoglycemia (11/5/2013); Insomnia (11/5/2013); Noncompliance (11/5/2013); PAD (peripheral artery disease) ([address] (11/5/2013); Persistent disorder of initiating or maintaining sleep (2/10/2015); Psychiatric disorder; and Urinary frequency (11/5/2013). He also has no past medical history of Asthma; Autoimmune disease ([facility]  Chronic obstructive pulmonary disease ([facility] Congestive heart failure, unspecified; COPD; Heart failure ([facility] Other ill-defined conditions(799.89); PUD (peptic ulcer disease); Seizures ([facility] Stroke [facility]); Thromboembolus ([facility] or Thyroid disease. Mr. Mary Carmen Cano  has a past surgical history that includes colonoscopy; abdomen surgery proc unlisted; and vascular surgery procedure unlist (Right, 2/3/15). Social History/Living Environment:       Social History     Social History    Marital status:      Spouse name: N/A    Number of children: N/A    Years of education: N/A     Occupational History    Not on file. 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 , Disp: 90 Tab, Rfl: 3    furosemide (LASIX) 40 mg tablet, One daily, Disp: 90 Tab, Rfl: 3    potassium chloride (KLOR-CON M20) 20 mEq tablet, Take 1 Tab by mouth daily. , Disp: 90 Tab, Rfl: 4    buPROPion XL (WELLBUTRIN XL) 300 mg XL tablet, Take 1 Tab by mouth daily. , Disp: 90 Tab, Rfl: 3    doxepin (SINEQUAN) 75 mg capsule, Take 1 Cap by mouth nightly., Disp: 90 Cap, Rfl: 3    Ranolazine 1,000 mg Tb12, Take 1,000 mg by mouth two (2) times a day., Disp: , Rfl:     pantoprazole (PROTONIX) 40 mg tablet, TAKE ONE (1) TABLET(S) ONCE DAILY, Disp: 90 Tab, Rfl: 3    insulin glargine (LANTUS) 100 unit/mL injection, by SubCUTAneous route nightly., Disp: , Rfl:     insulin lispro (HUMALOG) 100 unit/mL injection, by SubCUTAneous route., Disp: , Rfl:     cpap machine kit, by Does Not Apply route. autopap 5-20, Disp: , Rfl:     hydrALAZINE (APRESOLINE) 25 mg tablet, Take 25 mg by mouth three (3) times daily. , Disp: , Rfl:     PROAIR HFA 90 mcg/actuation inhaler, , Disp: , Rfl:     ONE TOUCH ULTRA TEST strip, , Disp: , Rfl:     SPIRIVA WITH HANDIHALER 18 mcg inhalation capsule, , Disp: , Rfl:     losartan (COZAAR) 100 mg tablet, Take 100 mg by mouth daily. , Disp: , Rfl:     NEEDLES, INSULIN DISPOSABLE (BD ULTRA-FINE JOSE PEN NEEDLES), by Does Not Apply route., Disp: , Rfl:     nitroglycerin (NITROSTAT) 0.4 mg SL tablet, by SubLINGual route every five (5) minutes as needed for Chest Pain., Disp: , Rfl:     isosorbide mononitrate ER (IMDUR) 30 mg tablet, Take 120 mg by mouth daily. , Disp: , Rfl:     tamsulosin (FLOMAX) 0.4 mg capsule, Take 0.4 mg by mouth daily. , Disp: , Rfl:     ASPIR-81 81 mg TbEC, take 81 mg by mouth daily. , Disp: , Rfl:    Date Last Reviewed:  1/30/2017     Number of Personal Factors/Comorbidities that affect the Plan of Care: 3+: HIGH COMPLEXITY   EXAMINATION:   ROM:          WNL except some tightness in calves right greater than left  Strength:          Good except decreased power in left DF. Functional Mobility:         Gait/Ambulation:  Pt ambulates without assistive device and self reports only about 50 - 100' (will try a 6 mintue walk test). Pt shows bilateral heel scuff with small steps (decreased hip and core strength).         Transfers: independent        Bed Mobility:  NT  Mental Status:          Alert and oriented x 4. Occ decreased command following and motor processing. Impulsive. Vision:          Can see to drive without correction in the day. Getting glasses for his night vision. Body Structures Involved:  1. Nerves  2. Heart  3. Lungs  4. Bones  5. Joints  6. Muscles  7. Ligaments Body Functions Affected:  1. Mental  2. Sensory/Pain  3. Cardio  4. Respiratory  5. Neuromusculoskeletal  6. Movement Related Activities and Participation Affected:  1. Learning and Applying Knowledge  2. General Tasks and Demands  3. Mobility  4. Self Care  5. Domestic Life  6. Interpersonal Interactions and Relationships  7. Community, Social and [place]   Number of elements that affect the Plan of Care: 4+: HIGH COMPLEXITY   CLINICAL PRESENTATION:   Presentation: Evolving clinical presentation with unstable and unpredictable characteristics: HIGH COMPLEXITY   CLINICAL DECISION MAKING:   Outcome Measure: Tool Used: 6-Minute Walk Test   Score:  Initial: 495 Feet = TBD Meters  3 seated rest breaks  Most Recent: TBD Feet = TBD Meters    Interpretation of Score: AGE  GENDER  MEAN  SD  NORMAL RANGE (2SD)    61-76  Male (15)   Female (22)  572   538  80   80  1-0   [phone]    66-77  Male (14)   Female (22)  527   471  80   69  46-65   [phone]    80-80  Male (8)   Female (15)  224   685  36   14  [phone]   [id number]        Tool Used: Nielsen Balance Scale  Score:  Initial: 41/56 Most Recent: X/56 (Date: -- )   Interpretation of Score: Each section is scored on a 0-4 scale, 0 representing the patients inability to perform the task and 4 representing independence. The scores of each section are added together for a total score of 56. The higher the patients score, the more independent the patient is. Any score below 45 indicates increased risk for falls. Score 56 55-45 44-34 33-23 22-12 11-1 0   Modifier CH CI CJ CK CL CM CN     ?  Mobility - Walking and Moving Around:     - CURRENT STATUS: CJ - 20%-39% impaired, limited or restricted    - GOAL STATUS: CI - 1%-19% impaired, limited or restricted    - D/C STATUS:  ---------------To be determined---------------    Tool Used: Dynamic Gait Index  Score:  Initial: 16/24 Most Recent: X/24 (Date: -- )   Interpretation of Score: Each section is scored on a 0-3 scale, 0 representing the patients inability to perform the task and 3 representing independence. The scores of each section are added together for a total score of 24. Any score below 19 indicates increased risk for falls. Score 24 23-19 18-15 14-10 9-5 4-1 0   Modifier  CI CJ CK CL CM CN     Tool Used: Timed Up and Go (TUG)  Score:  Initial: 12.65 seconds Most Recent: X seconds (Date: -- )   Interpretation of Score: The test measures, in seconds, the time taken by an individual to stand up from a standard arm chair (seat height 46 cm [18 in], arm height 65 cm [25.6 in]), walk a distance of 3 meters (118 in, approx 10 ft), turn, walk back to the chair and sit down. If the individual takes longer than 14 seconds to complete TUG, this indicates risk for falls. Score 7 7.5-10.5 11-14 14.5-17.5 18-21 21.5-24.5 25+   Modifier CH CI CJ CK CL CM CN     Medical Necessity:   · Skilled intervention continues to be required due to [facility]. Reason for Services/Other Comments:  · Patient continues to require skilled intervention due to [id number] [address] DEFICIT. Use of outcome tool(s) and clinical judgement create a POC that gives a: Difficult prediction of patient's progress: HIGH COMPLEXITY      S:  \"sorry I am running late. I haven't noticed any changes. No pain unless I walk. \"      TREATMENT:   (In addition to Assessment/Re-Assessment sessions the following treatments were rendered)    Therapeutic Exercise: ( 35 minutes):  Exercises per grid below to improve mobility, strength and balance.   Required moderate visual, verbal and tactile cues to promote proper body alignment and promote proper body mechanics. Progressed complexity of movement as indicated. Before therapy a quick guided meditation with eyes closed of sensations in feet, ankles and calves followed again after therapy. Afterwards pt states legs feel better, warmer with more feeling. Date:  1/19/17 Date:  1/26/17   Date:  1/30/17     Activity/Exercise Parameters Parameters Parameters   Nu Step   Arms at 13  Seat at 13  Level 1  Time:  2:51 minutes legs started hurting. Heel/toe raises 3 x 10 reps - patient bending knees and then pushing up into heel raise despite cuing  Standing heel toe raises - at rail x 20 x20    Toe raises 2 x 10 reps with bouncing  Above -   Standing lateral foot raises  X20. Good ability X20. Cues to [facility] along borders of feet. Hip circles 2 x 10 reps each direction  Very difficulty. Pt rotating at hips and ankles vs full [place] around feet. 10x each directions. Very difficult for pt. Cannot follow directions or visual cueing for this one. Good effort and able to get some decent WSs.  ~10 CW  ~10 CCW   Seated ankle pumps X 20 reps  x20  x20   Lateral ankle rocking  2 x 10 reps  As above As above   Standing hip extension  2 x 10' - very poor form with visual, verbal, tactile cues  2 x 10. Instructed to perform like a leg swing and to keep the knee straight. Better form x20 each. More coordinated. Standing hip abduction  2 x 10. Excellent on the right. Left uncoordinated. x20 each leg. Guiding for the left leg. Single leg stance   2 x 10 on each. Had to sit and rest.    Having pt tap rail to decrease hand hold   Standing forward/back bend   x10 good speed and range. No hand hold. One LOB. Calf stretch on incline board 2 x 30 sec hold  3 x 30 seconds 3 x 20 seconds. Could not follow directions for the stretch. Would not hold still. Took about 5 explanations to understand.      Pre-Treatment Symptoms:  No pain. Treatment/Session Assessment:  Stated he had not noticed any change in general but that his legs and feet feel better after therapy. Much difficulty with some tasks. Steadier gait at end of session vs. upon entering therapy. He continues to benefit from skilled PT to improve his mobility and balance. Patients response to todays treatment session was tolerated well with no medical complications. · Post session pain:  NO PAIN  · Compliance with Program/Exercises: Will assess as treatment progresses. · Recommendations/Intent for next treatment session: \"Next visit will focus on progressive high level balance and LE sensorimotor awareness stretches and exercises. \".   Total Treatment Duration:  PT Patient Time In/Time Out  Time In: 1420  Time Out: Vamsi Alicea PT

## 2017-02-03 ENCOUNTER — HOSPITAL ENCOUNTER (OUTPATIENT)
Dept: PHYSICAL THERAPY | Age: 77
Discharge: HOME | End: 2017-02-03
Attending: INTERNAL MEDICINE
Payer: COMMERCIAL

## 2017-02-03 PROCEDURE — 97110 THERAPEUTIC EXERCISES: CPT

## 2017-02-03 NOTE — PROGRESS NOTES
Huong Milan. : 1940 [facility] [address]  [phone]   [phone]          OUTPATIENT PHYSICAL THERAPY:Daily Note 2/3/2017    ICD-10: Treatment Diagnosis: Repeated falls (R29.6)  Unsteadiness on feet (R26.81)  Unspecified lack of coordination (R27.9)  Precautions/Allergies:   Pcn [penicillins] Nitroglycerin with pt at all times - CP maybe every 2 weeks with strenuous walking or activity. Fall Risk Score: 10 (? 5 = High Risk)  MD Orders: Outpatient PT referral MEDICAL/REFERRING DIAGNOSIS:  history of falls   DATE OF ONSET: Few months  REFERRING PHYSICIAN: Angeline Maldonado MD  RETURN PHYSICIAN APPOINTMENT: unknown  DATE OF PROGRESS NOTE:    RECERTIFICATION DATE: 3/52/76     INITIAL ASSESSMENT:  Mr. Perry Mooney presents with increased instance of falling, maybe 2 per month. Pt reports a fall when he was leaning over and one going down steps without lights on which is more of an accident vs balance loss but does speak to decreased sensory awareness with vision removed. Pt has multiple medical problems contributing to his deficits such as his diabetes, PAD with pain in legs walking 50 - 100', CAD with ~biweekly chest pain needing Nitroglycerin. 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 Pt show decreased command following and motor processing. Pt's biggest complaint to me are his hands - he states he can be holding something and just drop it without knowing. Pt will benefit from OT consult for this. Pt presents with gait and balance deficit and functional hip weakness. Pt will benefit from PT to maximize ability and safety with mobility    PROBLEM LIST (Impacting functional limitations):  1. Decreased Strength  2. Decreased ADL/Functional Activities  3. Decreased Transfer Abilities  4. Decreased Ambulation Ability/Technique  5.  Decreased Balance  6. Increased Pain  7. Decreased Activity Tolerance  8. Increased Fatigue  9. Increased Shortness of Breath  10. Decreased Flexibility/Joint Mobility  11. Decreased Knowledge of Precautions  12. Decreased [facility] with Home Exercise Program  13. Decreased Cognition INTERVENTIONS PLANNED:  1. Balance Exercise  2. Gait Training  3. Home Exercise Program (HEP)  4. Neuromuscular Re-education/Strengthening  5. Range of Motion (ROM)  6. Therapeutic Activites  7. Therapeutic Exercise/Strengthening  8. Transfer Training  9. possible orthotic consult   TREATMENT PLAN:  Effective Dates: 1/19/17 TO 4/14/17. Frequency/Duration: 2 times a week as able for this patient. (He rarely knows his work schedule more than a couple of day in advance and can be needed to drive nearly any day of the week) for 12 weeks  GOALS: (Goals have been discussed and agreed upon with patient.)  Short-Term Functional Goals: Time Frame: 4 weeks  1. Pt will be independent with range of motion, strength and balance home exercise program.  Discharge Goals: Time Frame: 8 weeks  Pt will show increased range of motion and improved posture to allow for improved safety and ability with all functional mobility. Pt will decrease TUG score indicating more normalized gait pattern and decrease risk for falls. Pt will increase Alexis Balance Scale to 48/56 indicating increased balance and decreased risk for falls. Pt will increase Dynamic Gait Index to 19/24 indicating increase gait balance and decreased risk for falls. Pt will be able to ambulate increased community distances with least restrictive assistive device independent and safe as evidenced by increased distance on the 6 minute walk test.    Rehabilitation Potential For Stated Goals: Fair  Regarding Viktoria Snider Jr.'s therapy, I certify that the treatment plan above will be carried out by a therapist or under their direction.   Thank you for this referral,  Sam Schneider, PT Referring Physician Signature: Tessa Santana MD              Date                    HISTORY:   GOAL:  \"To get my fingers working better. Like to walk better. Present Symptoms:    Falling 1 - 2 months - Kind of stoop over and put a tag on the car and got right back up. Sometimes I get dizzy when my sugar is low. Check it twice a day. Going down the sairs and I think I tripped. 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 Can drive 4 - 5 hours one way and then come back. History of Present Injury/Illness (Reason for Referral):    Past Medical History/Comorbidities:   Mr. Armand Pike  has a past medical history of Abdominal distension (11/5/2013); Acute gout (11/5/2013); Anemia; Anxiety (5/1/2013); Anxiety and depression (5/1/2013); BPH (benign prostatic hyperplasia) (11/5/2013); Bronchitis (11/5/2013); CAD (coronary artery disease) (11/5/2013); CAD (coronary artery disease) (11/5/2013); Carcinoma, lung ([facility] (8/10/2015); Chronic kidney disease; CKD (chronic kidney disease) (11/5/2013); Conjunctivitis (11/5/2013); DEMENTIA; Depression; Diabetes ([facility] Diabetes mellitus ([facility] (5/1/2013); DJD (degenerative joint disease) (11/5/2013); Fatty liver (11/7/2013); Fatty liver (11/7/2013); Gall stone (11/5/2013); GERD (gastroesophageal reflux disease) (11/5/2013); GERD (gastroesophageal reflux disease) (11/5/2013); Hepatomegaly (11/5/2013); Hepatomegaly (11/5/2013); Hepatomegaly (11/5/2013); History of GI tumor (11/5/2013); Hypercholesterolemia; Hypertension; Hypoglycemia (11/5/2013); Hypoglycemia (11/5/2013); Insomnia (11/5/2013); Noncompliance (11/5/2013); PAD (peripheral artery disease) ([facility] (11/5/2013); Persistent disorder of initiating or maintaining sleep (2/10/2015); Psychiatric disorder; and Urinary frequency (11/5/2013). He also has no past medical history of Asthma; Autoimmune disease ([facility]  Chronic obstructive pulmonary disease ([facility] Congestive heart failure, unspecified; COPD; Heart failure ([facility] Other ill-defined conditions(799.89); PUD (peptic ulcer disease); Seizures ([facility] Stroke [facility]); Thromboembolus ([facility] or Thyroid disease. Mr. Mireya Charles  has a past surgical history that includes colonoscopy; abdomen surgery proc unlisted; and vascular surgery procedure unlist (Right, 2/3/15). Social History/Living Environment:       Social History     Social History    Marital status:      Spouse name: N/A    Number of children: N/A    Years of education: N/A     Occupational History    Not on file. 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 , Disp: 90 Tab, Rfl: 3    furosemide (LASIX) 40 mg tablet, One daily, Disp: 90 Tab, Rfl: 3    potassium chloride (KLOR-CON M20) 20 mEq tablet, Take 1 Tab by mouth daily. , Disp: 90 Tab, Rfl: 4    buPROPion XL (WELLBUTRIN XL) 300 mg XL tablet, Take 1 Tab by mouth daily. , Disp: 90 Tab, Rfl: 3    doxepin (SINEQUAN) 75 mg capsule, Take 1 Cap by mouth nightly., Disp: 90 Cap, Rfl: 3    Ranolazine 1,000 mg Tb12, Take 1,000 mg by mouth two (2) times a day., Disp: , Rfl:     pantoprazole (PROTONIX) 40 mg tablet, TAKE ONE (1) TABLET(S) ONCE DAILY, Disp: 90 Tab, Rfl: 3    insulin glargine (LANTUS) 100 unit/mL injection, by SubCUTAneous route nightly., Disp: , Rfl:     insulin lispro (HUMALOG) 100 unit/mL injection, by SubCUTAneous route., Disp: , Rfl:     cpap machine kit, by Does Not Apply route. autopap 5-20, Disp: , Rfl:     hydrALAZINE (APRESOLINE) 25 mg tablet, Take 25 mg by mouth three (3) times daily. , Disp: , Rfl:     PROAIR HFA 90 mcg/actuation inhaler, , Disp: , Rfl:     ONE TOUCH ULTRA TEST strip, , Disp: , Rfl:     SPIRIVA WITH HANDIHALER 18 mcg inhalation capsule, , Disp: , Rfl:     losartan (COZAAR) 100 mg tablet, Take 100 mg by mouth daily. , Disp: , Rfl:     NEEDLES, INSULIN DISPOSABLE (BD ULTRA-FINE JOSE PEN NEEDLES), by Does Not Apply route., Disp: , Rfl:     nitroglycerin (NITROSTAT) 0.4 mg SL tablet, by SubLINGual route every five (5) minutes as needed for Chest Pain., Disp: , Rfl:     isosorbide mononitrate ER (IMDUR) 30 mg tablet, Take 120 mg by mouth daily. , Disp: , Rfl:     tamsulosin (FLOMAX) 0.4 mg capsule, Take 0.4 mg by mouth daily. , Disp: , Rfl:     ASPIR-81 81 mg TbEC, take 81 mg by mouth daily. , Disp: , Rfl:    Date Last Reviewed:  2/3/2017     Number of Personal Factors/Comorbidities that affect the Plan of Care: 3+: HIGH COMPLEXITY   EXAMINATION:   ROM:          WNL except some tightness in calves right greater than left  Strength:          Good except decreased power in left DF. Functional Mobility:         Gait/Ambulation:  Pt ambulates without assistive device and self reports only about 50 - 100' (will try a 6 mintue walk test). Pt shows bilateral heel scuff with small steps (decreased hip and core strength).         Transfers: independent        Bed Mobility:  NT  Mental Status:          Alert and oriented x 4. Occ decreased command following and motor processing. Impulsive. Vision:          Can see to drive without correction in the day. Getting glasses for his night vision. Body Structures Involved:  1. Nerves  2. Heart  3. Lungs  4. Bones  5. Joints  6. Muscles  7. Ligaments Body Functions Affected:  1. Mental  2. Sensory/Pain  3. Cardio  4. Respiratory  5. Neuromusculoskeletal  6. Movement Related Activities and Participation Affected:  1. Learning and Applying Knowledge  2. General Tasks and Demands  3. Mobility  4. Self Care  5. Domestic Life  6. Interpersonal Interactions and Relationships  7. Community, Social and [place]   Number of elements that affect the Plan of Care: 4+: HIGH COMPLEXITY   CLINICAL PRESENTATION:   Presentation: Evolving clinical presentation with unstable and unpredictable characteristics: HIGH COMPLEXITY   CLINICAL DECISION MAKING:   Outcome Measure: Tool Used: 6-Minute Walk Test   Score:  Initial: 495 Feet = TBD Meters  3 seated rest breaks  Most Recent: TBD Feet = TBD Meters    Interpretation of Score: AGE  GENDER  MEAN  SD  NORMAL RANGE (2SD)    61-76  Male (15)   Female (22)  572   538  80   80  1-0   [phone]    66-77  Male (14)   Female (22)  527   471  80   69  46-65   [phone]    80-80  Male (8)   Female (15)  526   415  04   74  450-884   [id number]        Tool Used: [place] Balance Scale  Score:  Initial: 41/56 Most Recent: X/56 (Date: -- )   Interpretation of Score: Each section is scored on a 0-4 scale, 0 representing the patients inability to perform the task and 4 representing independence. The scores of each section are added together for a total score of 56. The higher the patients score, the more independent the patient is. Any score below 45 indicates increased risk for falls. Score 56 55-45 44-34 33-23 22-12 11-1 0   Modifier CH CI CJ CK CL CM CN     ?  Mobility - Walking and Moving Around:     - CURRENT STATUS: CJ - 20%-39% impaired, limited or restricted    - GOAL STATUS: CI - 1%-19% impaired, limited or restricted    - D/C STATUS:  ---------------To be determined---------------    Tool Used: Dynamic Gait Index  Score:  Initial: 16/24 Most Recent: X/24 (Date: -- )   Interpretation of Score: Each section is scored on a 0-3 scale, 0 representing the patients inability to perform the task and 3 representing independence. The scores of each section are added together for a total score of 24. Any score below 19 indicates increased risk for falls. Score 24 23-19 18-15 14-10 9-5 4-1 0   Modifier  CI CJ CK CL CM CN     Tool Used: Timed Up and Go (TUG)  Score:  Initial: 12.65 seconds Most Recent: X seconds (Date: -- )   Interpretation of Score: The test measures, in seconds, the time taken by an individual to stand up from a standard arm chair (seat height 46 cm [18 in], arm height 65 cm [25.6 in]), walk a distance of 3 meters (118 in, approx 10 ft), turn, walk back to the chair and sit down. If the individual takes longer than 14 seconds to complete TUG, this indicates risk for falls. Score 7 7.5-10.5 11-14 14.5-17.5 18-21 21.5-24.5 25+   Modifier  CI CJ CK CL CM CN     Medical Necessity:   · Skilled intervention continues to be required due to [address] [facility]. Reason for Services/Other Comments:  · Patient continues to require skilled intervention due to [address] [address] DEFICIT. Use of outcome tool(s) and clinical judgement create a POC that gives a: Difficult prediction of patient's progress: HIGH COMPLEXITY      S:  \"No pain. \"      TREATMENT:   (In addition to Assessment/Re-Assessment sessions the following treatments were rendered)    Therapeutic Exercise: ( 40 minutes):  Exercises per grid below to improve mobility, strength and balance.   Required moderate visual, verbal and tactile cues to promote proper body alignment and promote proper body mechanics. Progressed complexity of movement as indicated. Before therapy a quick guided meditation with eyes closed of sensations in feet, ankles and calves followed again after therapy. Afterwards pt states legs feel better, warmer with more feeling. Date:  1/19/17 Date:  1/26/17   Date:  1/30/17   Date:  2/3/17   Activity/Exercise Parameters Parameters Parameters    Nu Step   Arms at 13  Seat at 13  Level 1  Time:  2:51 minutes legs started hurting. Arms at 13  Seat at 13  Level 1  Time: 5:00 minutes legs started hurting. Heel/toe raises 3 x 10 reps - patient bending knees and then pushing up into heel raise despite cuing  Standing heel toe raises - at rail x 20 x20  x20   Toe raises 2 x 10 reps with bouncing  Above - -   Standing lateral foot raises  X20. Good ability X20. Cues to [facility] along borders of feet. x20. Hip circles 2 x 10 reps each direction  Very difficulty. Pt rotating at hips and ankles vs full [place] around feet. 10x each directions. Very difficult for pt. Cannot follow directions or visual cueing for this one. Good effort and able to get some decent WSs.  ~10 CW  ~10 CCW Very difficult still for pt. Pt rotate feet and hips vs circling around feet. Seated ankle pumps X 20 reps  x20  x20 x20   Lateral ankle rocking  2 x 10 reps  As above As above    Standing hip extension  2 x 10' - very poor form with visual, verbal, tactile cues  2 x 10. Instructed to perform like a leg swing and to keep the knee straight. Better form x20 each. More coordinated. 2 x 10 with yellow band   Standing hip abduction  2 x 10. Excellent on the right. Left uncoordinated. x20 each leg. Guiding for the left leg. 2 x 10 with yellow band   Single leg stance   2 x 10 on each. Had to sit and rest.    Having pt tap rail to decrease hand hold 3 x 10 seconds. Difficulty mirroring PT or following commands. Standing forward/back bend   x10 good speed and range.   No hand hold. One LOB. -   Calf stretch on incline board 2 x 30 sec hold  3 x 30 seconds 3 x 20 seconds. Could not follow directions for the stretch. Would not hold still. Took about 5 explanations to understand. Pre-Treatment Symptoms:  No pain. Treatment/Session Assessment:  Works very quickly. Difficulty following commands. SOB with activities. Long seated rest breaks. Better tolerance for activity today - no leg pain with Nu Step. Stated he had not noticed any change in general but that his legs and feet feel better after therapy. Much difficulty with some tasks. He continues to benefit from skilled PT to improve his mobility and balance. Patients response to todays treatment session was tolerated well with no medical complications. · Post session pain:  NO PAIN  · Compliance with Program/Exercises: Will assess as treatment progresses. · Recommendations/Intent for next treatment session: \"Next visit will focus on progressive high level balance and LE sensorimotor awareness stretches and exercises. \".   Total Treatment Duration:  PT Patient Time In/Time Out  Time In: 1305  Time Out: 101 [address], PT

## 2017-02-08 ENCOUNTER — HOSPITAL ENCOUNTER (OUTPATIENT)
Dept: PHYSICAL THERAPY | Age: 77
Discharge: HOME | End: 2017-02-08
Attending: INTERNAL MEDICINE
Payer: COMMERCIAL

## 2017-02-08 PROCEDURE — 97110 THERAPEUTIC EXERCISES: CPT

## 2017-02-08 NOTE — PROGRESS NOTES
Mahendra Dawson. : 1940 [facility] [place] [facility] [address] [address] [facility]  [phone]   [phone]          OUTPATIENT PHYSICAL THERAPY:Daily Note 2017    ICD-10: Treatment Diagnosis: Repeated falls (R29.6)  Unsteadiness on feet (R26.81)  Unspecified lack of coordination (R27.9)  Precautions/Allergies:   Pcn [penicillins] Nitroglycerin with pt at all times - CP maybe every 2 weeks with strenuous walking or activity. Fall Risk Score: 10 (? 5 = High Risk)  MD Orders: Outpatient PT referral MEDICAL/REFERRING DIAGNOSIS:  history of falls   DATE OF ONSET: Few months  REFERRING PHYSICIAN: Raulito Leos MD  RETURN PHYSICIAN APPOINTMENT: unknown  DATE OF PROGRESS NOTE:    RECERTIFICATION DATE:      INITIAL ASSESSMENT:  Mr. Dulce Chester presents with increased instance of falling, maybe 2 per month. Pt reports a fall when he was leaning over and one going down steps without lights on which is more of an accident vs balance loss but does speak to decreased sensory awareness with vision removed. Pt has multiple medical problems contributing to his deficits such as his diabetes, PAD with pain in legs walking 50 - 100', CAD with ~biweekly chest pain needing Nitroglycerin. 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 Pt show decreased command following and motor processing. Pt's biggest complaint to me are his hands - he states he can be holding something and just drop it without knowing. Pt will benefit from OT consult for this. Pt presents with gait and balance deficit and functional hip weakness. Pt will benefit from PT to maximize ability and safety with mobility    PROBLEM LIST (Impacting functional limitations):  1. Decreased Strength  2. Decreased ADL/Functional Activities  3. Decreased Transfer Abilities  4. Decreased Ambulation Ability/Technique  5.  Decreased Balance  6. Increased Pain  7. Decreased Activity Tolerance  8. Increased Fatigue  9. Increased Shortness of Breath  10. Decreased Flexibility/Joint Mobility  11. Decreased Knowledge of Precautions  12. Decreased [place] with Home Exercise Program  13. Decreased Cognition INTERVENTIONS PLANNED:  1. Balance Exercise  2. Gait Training  3. Home Exercise Program (HEP)  4. Neuromuscular Re-education/Strengthening  5. Range of Motion (ROM)  6. Therapeutic Activites  7. Therapeutic Exercise/Strengthening  8. Transfer Training  9. possible orthotic consult   TREATMENT PLAN:  Effective Dates: 1/19/17 TO 4/14/17. Frequency/Duration: 2 times a week as able for this patient. (He rarely knows his work schedule more than a couple of day in advance and can be needed to drive nearly any day of the week) for 12 weeks  GOALS: (Goals have been discussed and agreed upon with patient.)  Short-Term Functional Goals: Time Frame: 4 weeks  1. Pt will be independent with range of motion, strength and balance home exercise program.  Discharge Goals: Time Frame: 8 weeks  Pt will show increased range of motion and improved posture to allow for improved safety and ability with all functional mobility. Pt will decrease TUG score indicating more normalized gait pattern and decrease risk for falls. Pt will increase Alexis Balance Scale to 48/56 indicating increased balance and decreased risk for falls. Pt will increase Dynamic Gait Index to 19/24 indicating increase gait balance and decreased risk for falls. Pt will be able to ambulate increased community distances with least restrictive assistive device independent and safe as evidenced by increased distance on the 6 minute walk test.    Rehabilitation Potential For Stated Goals: Fair              HISTORY:   GOAL:  \"To get my fingers working better. Like to walk better.     Present Symptoms:  Pain Intensity 1: 0 Falling 1 - 2 months - Kind of stoop over and put a tag on the car and got right back up. Sometimes I get dizzy when my sugar is low. Check it twice a day. Going down the sairs and I think I tripped. 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 I don't know exactaly what days of the week I am gone. Can drive 4 - 5 hours one way and then come back. History of Present Injury/Illness (Reason for Referral):    Past Medical History/Comorbidities:   Mr. Laurent Ruffin  has a past medical history of Abdominal distension (11/5/2013); Acute gout (11/5/2013); Anemia; Anxiety (5/1/2013); Anxiety and depression (5/1/2013); BPH (benign prostatic hyperplasia) (11/5/2013); Bronchitis (11/5/2013); CAD (coronary artery disease) (11/5/2013); CAD (coronary artery disease) (11/5/2013); Carcinoma, lung ([facility] (8/10/2015); Chronic kidney disease; CKD (chronic kidney disease) (11/5/2013); Conjunctivitis (11/5/2013); DEMENTIA; Depression; Diabetes ([facility] Diabetes mellitus ([facility] (5/1/2013); DJD (degenerative joint disease) (11/5/2013); Fatty liver (11/7/2013); Fatty liver (11/7/2013); Gall stone (11/5/2013); GERD (gastroesophageal reflux disease) (11/5/2013); GERD (gastroesophageal reflux disease) (11/5/2013); Hepatomegaly (11/5/2013); Hepatomegaly (11/5/2013); Hepatomegaly (11/5/2013); History of GI tumor (11/5/2013); Hypercholesterolemia; Hypertension; Hypoglycemia (11/5/2013); Hypoglycemia (11/5/2013); Insomnia (11/5/2013); Noncompliance (11/5/2013); PAD (peripheral artery disease) ([address] (11/5/2013); Persistent disorder of initiating or maintaining sleep (2/10/2015); Psychiatric disorder; and Urinary frequency (11/5/2013). He also has no past medical history of Asthma; Autoimmune disease ([address] Chronic obstructive pulmonary disease ([facility] Congestive heart failure, unspecified; COPD; Heart failure ([facility] Other ill-defined conditions(799.89); PUD (peptic ulcer disease); Seizures ([facility] Stroke [facility]); Thromboembolus ([facility] or Thyroid disease.   Yoli Laurent Ruffin  has a past surgical history that includes colonoscopy; abdomen surgery proc unlisted; and vascular surgery procedure unlist (Right, 2/3/15). Social History/Living Environment:       Social History     Social History    Marital status:      Spouse name: N/A    Number of children: N/A    Years of education: N/A     Occupational History    Not on file. 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 , Disp: 90 Tab, Rfl: 3    furosemide (LASIX) 40 mg tablet, One daily, Disp: 90 Tab, Rfl: 3    potassium chloride (KLOR-CON M20) 20 mEq tablet, Take 1 Tab by mouth daily. , Disp: 90 Tab, Rfl: 4    buPROPion XL (WELLBUTRIN XL) 300 mg XL tablet, Take 1 Tab by mouth daily. , Disp: 90 Tab, Rfl: 3    doxepin (SINEQUAN) 75 mg capsule, Take 1 Cap by mouth nightly., Disp: 90 Cap, Rfl: 3    Ranolazine 1,000 mg Tb12, Take 1,000 mg by mouth two (2) times a day., Disp: , Rfl:     pantoprazole (PROTONIX) 40 mg tablet, TAKE ONE (1) TABLET(S) ONCE DAILY, Disp: 90 Tab, Rfl: 3    insulin glargine (LANTUS) 100 unit/mL injection, by SubCUTAneous route nightly., Disp: , Rfl:     insulin lispro (HUMALOG) 100 unit/mL injection, by SubCUTAneous route., Disp: , Rfl:     cpap machine kit, by Does Not Apply route. autopap 5-20, Disp: , Rfl:     hydrALAZINE (APRESOLINE) 25 mg tablet, Take 25 mg by mouth three (3) times daily. , Disp: , Rfl:     PROAIR HFA 90 mcg/actuation inhaler, , Disp: , Rfl:     ONE TOUCH ULTRA TEST strip, , Disp: , Rfl:     SPIRIVA WITH HANDIHALER 18 mcg inhalation capsule, , Disp: , Rfl:     losartan (COZAAR) 100 mg tablet, Take 100 mg by mouth daily. , Disp: , Rfl:     NEEDLES, INSULIN DISPOSABLE (BD ULTRA-FINE JOSE PEN NEEDLES), by Does Not Apply route., Disp: , Rfl:     nitroglycerin (NITROSTAT) 0.4 mg SL tablet, by SubLINGual route every five (5) minutes as needed for Chest Pain., Disp: , Rfl:     isosorbide mononitrate ER (IMDUR) 30 mg tablet, Take 120 mg by mouth daily. , Disp: , Rfl:     tamsulosin (FLOMAX) 0.4 mg capsule, Take 0.4 mg by mouth daily. , Disp: , Rfl:     ASPIR-81 81 mg TbEC, take 81 mg by mouth daily. , Disp: , Rfl:    Date Last Reviewed:  2/8/2017     Number of Personal Factors/Comorbidities that affect the Plan of Care: 3+: HIGH COMPLEXITY   EXAMINATION:   ROM:          WNL except some tightness in calves right greater than left  Strength:          Good except decreased power in left DF. Functional Mobility:         Gait/Ambulation:  Pt ambulates without assistive device and self reports only about 50 - 100' (will try a 6 mintue walk test). Pt shows bilateral heel scuff with small steps (decreased hip and core strength). Transfers:  independent        Bed Mobility:  NT  Mental Status:          Alert and oriented x 4. Occ decreased command following and motor processing. Impulsive. Vision:          Can see to drive without correction in the day. Getting glasses for his night vision.     Body Structures Involved:  1. Nerves  2. Heart  3. Lungs  4. Bones  5. Joints  6. Muscles  7. Ligaments Body Functions Affected:  1. Mental  2. Sensory/Pain  3. Cardio  4. Respiratory  5. Neuromusculoskeletal  6. Movement Related Activities and Participation Affected:  1. Learning and Applying Knowledge  2. General Tasks and Demands  3. Mobility  4. Self Care  5. Domestic Life  6. Interpersonal Interactions and Relationships  7. Community, Social and [place] [employer]   Number of elements that affect the Plan of Care: 4+: HIGH COMPLEXITY   CLINICAL PRESENTATION:   Presentation: Evolving clinical presentation with unstable and unpredictable characteristics: HIGH COMPLEXITY   CLINICAL DECISION MAKING:   Outcome Measure: Tool Used: 6-Minute Walk Test   Score:  Initial: 495 Feet = TBD Meters  3 seated rest breaks  Most Recent: TBD Feet = TBD Meters    Interpretation of Score: AGE  GENDER  MEAN  SD  NORMAL RANGE (2SD)    61-76  Male (15)   Female (22)  [id number]  80   80  1-0   [phone]    66-77  Male (14)   Female (22)  527   471  80   69  46-65   [phone]    80-80  Male (8)   Female (19)  770   793  97   67  380-462   222-562        Tool Used: Di Russian Balance Scale  Score:  Initial: 41/56    Interpretation of Score: Each section is scored on a 0-4 scale, 0 representing the patients inability to perform the task and 4 representing independence. The scores of each section are added together for a total score of 56. The higher the patients score, the more independent the patient is. Any score below 45 indicates increased risk for falls. Score 56 55-45 44-34 33-23 22-12 11-1 0   Modifier CH CI CJ CK CL CM CN     ?  Mobility - Walking and Moving Around:     - CURRENT STATUS: CJ - 20%-39% impaired, limited or restricted    - GOAL STATUS: CI - 1%-19% impaired, limited or restricted    - D/C STATUS:  ---------------To be determined---------------    Tool Used: Dynamic Gait Index  Score:  Initial: 16/24    Interpretation of Score: Each section is scored on a 0-3 scale, 0 representing the patients inability to perform the task and 3 representing independence. The scores of each section are added together for a total score of 24. Any score below 19 indicates increased risk for falls. Score 24 23-19 18-15 14-10 9-5 4-1 0   Modifier CH CI CJ CK CL CM CN     Tool Used: Timed Up and Go (TUG)  Score:  Initial: 12.65 seconds    Interpretation of Score: The test measures, in seconds, the time taken by an individual to stand up from a standard arm chair (seat height 46 cm [18 in], arm height 65 cm [25.6 in]), walk a distance of 3 meters (118 in, approx 10 ft), turn, walk back to the chair and sit down. If the individual takes longer than 14 seconds to complete TUG, this indicates risk for falls. Score 7 7.5-10.5 11-14 14.5-17.5 18-21 21.5-24.5 25+   Modifier CH CI CJ CK CL CM CN     Medical Necessity:   · Skilled intervention continues to be required due to [address] [facility]. Reason for Services/Other Comments:  · Patient continues to require skilled intervention due to [id number] [address] DEFICIT. Use of outcome tool(s) and clinical judgement create a POC that gives a: Difficult prediction of patient's progress: HIGH COMPLEXITY      S:  \"No pain. \"      TREATMENT:   (In addition to Assessment/Re-Assessment sessions the following treatments were rendered)  Therapeutic Exercise: (9 Minutes):  Exercises per grid below to improve mobility, strength and balance. Required moderate visual, verbal and tactile cues to promote proper body alignment and promote proper body mechanics. Progressed complexity of movement as indicated. Date:  1/30/17   Date:  2/3/17 Date  2/8/17   Activity/Exercise Parameters     Nu Step Arms at 13  Seat at 13  Level 1  Time:  2:51 minutes legs started hurting. Arms at 13  Seat at 13  Level 1  Time: 5:00 minutes legs started hurting.     Heel/toe raises x20  x20    Toe raises - -    Standing lateral foot raises X20. Cues to [facility] along borders of feet. x20. Hip circles Very difficult for pt. Cannot follow directions or visual cueing for this one. Good effort and able to get some decent WSs.  ~10 CW  ~10 CCW Very difficult still for pt. Pt rotate feet and hips vs circling around feet. Seated ankle pumps x20 x20    Lateral ankle rocking  As above     Standing hip extension  x20 each. More coordinated. 2 x 10 with yellow band 20 repetitions with yellow theraband resistance    Standing hip abduction x20 each leg. Guiding for the left leg. 2 x 10 with yellow band    Single leg stance 2 x 10 on each. Had to sit and rest.    Having pt tap rail to decrease hand hold 3 x 10 seconds. Difficulty mirroring PT or following commands. Standing forward/back bend x10 good speed and range. No hand hold. One LOB. -    Calf stretch on incline board 3 x 20 seconds. Could not follow directions for the stretch. Would not hold still. Took about 5 explanations to understand. Pre-Treatment Symptoms: Fabian Evans. notes no complaints of pain   Treatment/Session Assessment:  Fabian Evans. notes his blood sugar dropped and he requested a Pepsi. No cola available in clinic but patient offered candy bar - he declined and said he would go to the gas station to get a pepsi. He declined further assistance and states \"I'll be ok\" He attempted to reschedule for [facility] but she did not have any openings this Friday morning. · Post session pain:  No change in pain level  · Compliance with Program/Exercises: Will assess as treatment progresses. · Recommendations/Intent for next treatment session: \"Next visit will focus on progressive high level balance and LE sensorimotor awareness stretches and exercises. \".   Total Treatment Duration:  PT Patient Time In/Time Out  Time In: 1430  Time Out: EVA Santo

## 2017-02-09 ENCOUNTER — HOSPITAL ENCOUNTER (OUTPATIENT)
Dept: PHYSICAL THERAPY | Age: 77
Discharge: HOME | End: 2017-02-09
Attending: INTERNAL MEDICINE
Payer: COMMERCIAL

## 2017-02-09 PROCEDURE — 97110 THERAPEUTIC EXERCISES: CPT

## 2017-02-09 NOTE — PROGRESS NOTES
Romana Snipe. : 1940 [facility] [place] [address] [address] [facility]  [phone]   [phone]          OUTPATIENT PHYSICAL THERAPY:Daily Note 2017    ICD-10: Treatment Diagnosis: Repeated falls (R29.6)  Unsteadiness on feet (R26.81)  Unspecified lack of coordination (R27.9)  Precautions/Allergies:   Pcn [penicillins] Nitroglycerin with pt at all times - CP maybe every 2 weeks with strenuous walking or activity. Fall Risk Score: 10 (? 5 = High Risk)  MD Orders: Outpatient PT referral MEDICAL/REFERRING DIAGNOSIS:  history of falls   DATE OF ONSET: Few months  REFERRING PHYSICIAN: Kevin Castaneda MD  RETURN PHYSICIAN APPOINTMENT: unknown  DATE OF PROGRESS NOTE:    RECERTIFICATION DATE:      INITIAL ASSESSMENT:  Mr. Gaby Montano presents with increased instance of falling, maybe 2 per month. Pt reports a fall when he was leaning over and one going down steps without lights on which is more of an accident vs balance loss but does speak to decreased sensory awareness with vision removed. Pt has multiple medical problems contributing to his deficits such as his diabetes, PAD with pain in legs walking 50 - 100', CAD with ~biweekly chest pain needing Nitroglycerin. 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 Pt show decreased command following and motor processing. Pt's biggest complaint to me are his hands - he states he can be holding something and just drop it without knowing. Pt will benefit from OT consult for this. Pt presents with gait and balance deficit and functional hip weakness. Pt will benefit from PT to maximize ability and safety with mobility    PROBLEM LIST (Impacting functional limitations):  1. Decreased Strength  2. Decreased ADL/Functional Activities  3. Decreased Transfer Abilities  4. Decreased Ambulation Ability/Technique  5.  Decreased Balance  6. Increased Pain  7. Decreased Activity Tolerance  8. Increased Fatigue  9. Increased Shortness of Breath  10. Decreased Flexibility/Joint Mobility  11. Decreased Knowledge of Precautions  12. Decreased [place] with Home Exercise Program  13. Decreased Cognition INTERVENTIONS PLANNED:  1. Balance Exercise  2. Gait Training  3. Home Exercise Program (HEP)  4. Neuromuscular Re-education/Strengthening  5. Range of Motion (ROM)  6. Therapeutic Activites  7. Therapeutic Exercise/Strengthening  8. Transfer Training  9. possible orthotic consult   TREATMENT PLAN:  Effective Dates: 1/19/17 TO 4/14/17. Frequency/Duration: 2 times a week as able for this patient. (He rarely knows his work schedule more than a couple of day in advance and can be needed to drive nearly any day of the week) for 12 weeks  GOALS: (Goals have been discussed and agreed upon with patient.)  Short-Term Functional Goals: Time Frame: 4 weeks  1. Pt will be independent with range of motion, strength and balance home exercise program.  Discharge Goals: Time Frame: 8 weeks  Pt will show increased range of motion and improved posture to allow for improved safety and ability with all functional mobility. Pt will decrease TUG score indicating more normalized gait pattern and decrease risk for falls. Pt will increase Alexis Balance Scale to 48/56 indicating increased balance and decreased risk for falls. Pt will increase Dynamic Gait Index to 19/24 indicating increase gait balance and decreased risk for falls. Pt will be able to ambulate increased community distances with least restrictive assistive device independent and safe as evidenced by increased distance on the 6 minute walk test.    Rehabilitation Potential For Stated Goals: Fair              HISTORY:   GOAL:  \"To get my fingers working better. Like to walk better. Present Symptoms:    Falling 1 - 2 months - Kind of stoop over and put a tag on the car and got right back up. Sometimes I get dizzy when my sugar is low. Check it twice a day. Going down the sairs and I think I tripped. 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 Can drive 4 - 5 hours one way and then come back. History of Present Injury/Illness (Reason for Referral):    Past Medical History/Comorbidities:   Mr. Roya Berry  has a past medical history of Abdominal distension (11/5/2013); Acute gout (11/5/2013); Anemia; Anxiety (5/1/2013); Anxiety and depression (5/1/2013); BPH (benign prostatic hyperplasia) (11/5/2013); Bronchitis (11/5/2013); CAD (coronary artery disease) (11/5/2013); CAD (coronary artery disease) (11/5/2013); Carcinoma, lung ([facility] (8/10/2015); Chronic kidney disease; CKD (chronic kidney disease) (11/5/2013); Conjunctivitis (11/5/2013); DEMENTIA; Depression; Diabetes ([facility] [address] [facility] Diabetes mellitus ([facility] (5/1/2013); DJD (degenerative joint disease) (11/5/2013); Fatty liver (11/7/2013); Fatty liver (11/7/2013); Gall stone (11/5/2013); GERD (gastroesophageal reflux disease) (11/5/2013); GERD (gastroesophageal reflux disease) (11/5/2013); Hepatomegaly (11/5/2013); Hepatomegaly (11/5/2013); Hepatomegaly (11/5/2013); History of GI tumor (11/5/2013); Hypercholesterolemia; Hypertension; Hypoglycemia (11/5/2013); Hypoglycemia (11/5/2013); Insomnia (11/5/2013); Noncompliance (11/5/2013); PAD (peripheral artery disease) ([facility] (11/5/2013); Persistent disorder of initiating or maintaining sleep (2/10/2015); Psychiatric disorder; and Urinary frequency (11/5/2013). He also has no past medical history of Asthma; Autoimmune disease ([address] Chronic obstructive pulmonary disease ([facility] [address] [facility] Congestive heart failure, unspecified; COPD; Heart failure ([facility] [address] [facility] Other ill-defined conditions(799.89); PUD (peptic ulcer disease); Seizures ([facility] [address] [facility] Stroke [facility]); Thromboembolus ([facility] [address] [facility] or Thyroid disease.   Mr. Roya Berry  has a past surgical history that includes colonoscopy; abdomen surgery proc unlisted; and vascular surgery procedure unlist (Right, 2/3/15). Social History/Living Environment:       Social History     Social History    Marital status:      Spouse name: N/A    Number of children: N/A    Years of education: N/A     Occupational History    Not on file. 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 , Disp: 90 Tab, Rfl: 3    furosemide (LASIX) 40 mg tablet, One daily, Disp: 90 Tab, Rfl: 3    potassium chloride (KLOR-CON M20) 20 mEq tablet, Take 1 Tab by mouth daily. , Disp: 90 Tab, Rfl: 4    buPROPion XL (WELLBUTRIN XL) 300 mg XL tablet, Take 1 Tab by mouth daily. , Disp: 90 Tab, Rfl: 3    doxepin (SINEQUAN) 75 mg capsule, Take 1 Cap by mouth nightly., Disp: 90 Cap, Rfl: 3    Ranolazine 1,000 mg Tb12, Take 1,000 mg by mouth two (2) times a day., Disp: , Rfl:     pantoprazole (PROTONIX) 40 mg tablet, TAKE ONE (1) TABLET(S) ONCE DAILY, Disp: 90 Tab, Rfl: 3    insulin glargine (LANTUS) 100 unit/mL injection, by SubCUTAneous route nightly., Disp: , Rfl:     insulin lispro (HUMALOG) 100 unit/mL injection, by SubCUTAneous route., Disp: , Rfl:     cpap machine kit, by Does Not Apply route. autopap 5-20, Disp: , Rfl:     hydrALAZINE (APRESOLINE) 25 mg tablet, Take 25 mg by mouth three (3) times daily. , Disp: , Rfl:     PROAIR HFA 90 mcg/actuation inhaler, , Disp: , Rfl:     ONE TOUCH ULTRA TEST strip, , Disp: , Rfl:     SPIRIVA WITH HANDIHALER 18 mcg inhalation capsule, , Disp: , Rfl:     losartan (COZAAR) 100 mg tablet, Take 100 mg by mouth daily. , Disp: , Rfl:     NEEDLES, INSULIN DISPOSABLE (BD ULTRA-FINE JOSE PEN NEEDLES), by Does Not Apply route., Disp: , Rfl:     nitroglycerin (NITROSTAT) 0.4 mg SL tablet, by SubLINGual route every five (5) minutes as needed for Chest Pain., Disp: , Rfl:     isosorbide mononitrate ER (IMDUR) 30 mg tablet, Take 120 mg by mouth daily. , Disp: , Rfl:     tamsulosin (FLOMAX) 0.4 mg capsule, Take 0.4 mg by mouth daily. , Disp: , Rfl:     ASPIR-81 81 mg TbEC, take 81 mg by mouth daily. , Disp: , Rfl:    Date Last Reviewed:  2/9/2017     Number of Personal Factors/Comorbidities that affect the Plan of Care: 3+: HIGH COMPLEXITY   EXAMINATION:   ROM:          WNL except some tightness in calves right greater than left  Strength:          Good except decreased power in left DF. Functional Mobility:         Gait/Ambulation:  Pt ambulates without assistive device and self reports only about 50 - 100' (will try a 6 mintue walk test). Pt shows bilateral heel scuff with small steps (decreased hip and core strength). Transfers:  independent        Bed Mobility:  NT  Mental Status:          Alert and oriented x 4. Occ decreased command following and motor processing. Impulsive. Vision:          Can see to drive without correction in the day. Getting glasses for his night vision.     Body Structures Involved:  1. Nerves  2. Heart  3. Lungs  4. Bones  5. Joints  6. Muscles  7. Ligaments Body Functions Affected:  1. Mental  2. Sensory/Pain  3. Cardio  4. Respiratory  5. Neuromusculoskeletal  6. Movement Related Activities and Participation Affected:  1. Learning and Applying Knowledge  2. General Tasks and Demands  3. Mobility  4. Self Care  5. Domestic Life  6. Interpersonal Interactions and Relationships  7. Community, Social and [place]   Number of elements that affect the Plan of Care: 4+: HIGH COMPLEXITY   CLINICAL PRESENTATION:   Presentation: Evolving clinical presentation with unstable and unpredictable characteristics: HIGH COMPLEXITY   CLINICAL DECISION MAKING:   Outcome Measure: Tool Used: 6-Minute Walk Test   Score:  Initial: 495 Feet = TBD Meters  3 seated rest breaks  Most Recent: TBD Feet = TBD Meters    Interpretation of Score: AGE  GENDER  MEAN  SD  NORMAL RANGE (2SD)    61-76  Male (15)   Female (22)  [id number]  80   80  1-0   [phone]    66-77  Male (14)   Female (22)  527   471  80   69  46-65   [phone]    80-80  Male (8)   Female (32)  446   638  42   47  314-958   222-562        Tool Used: Rhodia Yang Balance Scale  Score:  Initial: 41/56    Interpretation of Score: Each section is scored on a 0-4 scale, 0 representing the patients inability to perform the task and 4 representing independence. The scores of each section are added together for a total score of 56. The higher the patients score, the more independent the patient is. Any score below 45 indicates increased risk for falls. Score 56 55-45 44-34 33-23 22-12 11-1 0   Modifier CH CI CJ CK CL CM CN     ?  Mobility - Walking and Moving Around:     - CURRENT STATUS: CJ - 20%-39% impaired, limited or restricted    - GOAL STATUS: CI - 1%-19% impaired, limited or restricted    - D/C STATUS:  ---------------To be determined---------------    Tool Used: Dynamic Gait Index  Score:  Initial: 16/24    Interpretation of Score: Each section is scored on a 0-3 scale, 0 representing the patients inability to perform the task and 3 representing independence. The scores of each section are added together for a total score of 24. Any score below 19 indicates increased risk for falls. Score 24 23-19 18-15 14-10 9-5 4-1 0   Modifier CH CI CJ CK CL CM CN     Tool Used: Timed Up and Go (TUG)  Score:  Initial: 12.65 seconds    Interpretation of Score: The test measures, in seconds, the time taken by an individual to stand up from a standard arm chair (seat height 46 cm [18 in], arm height 65 cm [25.6 in]), walk a distance of 3 meters (118 in, approx 10 ft), turn, walk back to the chair and sit down. If the individual takes longer than 14 seconds to complete TUG, this indicates risk for falls. Score 7 7.5-10.5 11-14 14.5-17.5 18-21 21.5-24.5 25+   Modifier CH CI CJ CK CL CM CN     Medical Necessity:   · Skilled intervention continues to be required due to [address] [facility]. Reason for Services/Other Comments:  · Patient continues to require skilled intervention due to [id number] [address] DEFICIT. Use of outcome tool(s) and clinical judgement create a POC that gives a: Difficult prediction of patient's progress: HIGH COMPLEXITY      S:  \"that was scary. I can tell when it is coming on. Got a pepsi and felt better. Checked it when I got home and it was 128 after the pepsi. Have you heard anything about my fingers. \"      TREATMENT:   (In addition to Assessment/Re-Assessment sessions the following treatments were rendered)  Therapeutic Exercise: ( 40 minutes):  Exercises per grid below to improve mobility, strength and balance. Required moderate visual, verbal and tactile cues to promote proper body alignment and promote proper body mechanics. Progressed complexity of movement as indicated.    Date:  1/30/17   Date:  2/3/17 Date  2/8/17 Date:  2/9/17   Activity/Exercise Parameters      Nu Step Arms at 13  Seat at 13  Level 1  Time:  2:51 minutes legs started hurting. Arms at 13  Seat at 13  Level 1  Time: 5:00 minutes legs started hurting. Arms at 13  Seat at 13  Level 1  Time: 5:00 minutes no leg pain    Heel/toe raises x20  x20  x20   Standing lateral foot raises X20. Cues to [facility] along borders of feet. x20.  x20   Hip circles Very difficult for pt. Cannot follow directions or visual cueing for this one. Good effort and able to get some decent WSs.  ~10 CW  ~10 CCW Very difficult still for pt. Pt rotate feet and hips vs circling around feet. -   Seated ankle pumps x20 x20  x20   Standing hip extension  x20 each. More coordinated. 2 x 10 with yellow band 20 repetitions with yellow theraband resistance  2 x 10 with yellow band   Standing hip abduction x20 each leg. Guiding for the left leg. 2 x 10 with yellow band  2 x 10 with yellow band   Single leg stance 2 x 10 on each. Had to sit and rest.    Having pt tap rail to decrease hand hold 3 x 10 seconds. Difficulty mirroring PT or following commands. 3 x 10 seconds each leg   Standing forward/back bend x10 good speed and range. No hand hold. One LOB. -     Calf stretch on incline board 3 x 20 seconds. Could not follow directions for the stretch. Would not hold still. Took about 5 explanations to understand. Pre-Treatment Symptoms: Wesley Franco. notes no complaints of pain   Treatment/Session Assessment:  Wesley Franco. was able to get a cola after he left and felt better. BS at home after that was 128. Still having pain with walking but able to go on bike without pain. Feels he is getting stronger. · Post session pain:  No change in pain level  · Compliance with Program/Exercises: Will assess as treatment progresses. · Recommendations/Intent for next treatment session: \"Next visit will focus on progressive high level balance and LE sensorimotor awareness stretches and exercises. \".   Total Treatment Duration:  PT Patient Time In/Time Out  Time In: 1405  Time Out: [address], PT

## 2017-02-10 ENCOUNTER — HOSPITAL ENCOUNTER (OUTPATIENT)
Dept: PHYSICAL THERAPY | Age: 77
Discharge: HOME | End: 2017-02-10
Attending: INTERNAL MEDICINE
Payer: COMMERCIAL

## 2017-02-13 ENCOUNTER — HOSPITAL ENCOUNTER (OUTPATIENT)
Dept: PHYSICAL THERAPY | Age: 77
Discharge: HOME | End: 2017-02-13
Attending: INTERNAL MEDICINE
Payer: COMMERCIAL

## 2017-02-13 PROCEDURE — 97110 THERAPEUTIC EXERCISES: CPT

## 2017-02-17 ENCOUNTER — HOSPITAL ENCOUNTER (OUTPATIENT)
Dept: PHYSICAL THERAPY | Age: 77
Discharge: HOME | End: 2017-02-17
Attending: INTERNAL MEDICINE
Payer: COMMERCIAL

## 2017-02-17 PROCEDURE — G8979 MOBILITY GOAL STATUS: HCPCS

## 2017-02-17 PROCEDURE — G8985 CARRY GOAL STATUS: HCPCS

## 2017-02-17 PROCEDURE — 97530 THERAPEUTIC ACTIVITIES: CPT

## 2017-02-17 PROCEDURE — 97166 OT EVAL MOD COMPLEX 45 MIN: CPT

## 2017-02-17 PROCEDURE — G8978 MOBILITY CURRENT STATUS: HCPCS

## 2017-02-17 PROCEDURE — G8984 CARRY CURRENT STATUS: HCPCS

## 2017-02-17 NOTE — PROGRESS NOTES
Mimi Moss. : 1940 [facility] [place] [facility] [address] [address] [facility]  [phone]   [phone]          OUTPATIENT PHYSICAL THERAPY:Progress Report 2017    ICD-10: Treatment Diagnosis: Repeated falls (R29.6)  Unsteadiness on feet (R26.81)  Unspecified lack of coordination (R27.9)  Precautions/Allergies:   Pcn [penicillins] Nitroglycerin with pt at all times - CP maybe every 2 weeks with strenuous walking or activity. Fall Risk Score: 10 (? 5 = High Risk)  MD Orders: Outpatient PT referral MEDICAL/REFERRING DIAGNOSIS:  history of falls   DATE OF ONSET: Few months  REFERRING PHYSICIAN: Jone Dwyer MD  RETURN PHYSICIAN APPOINTMENT: unknown  DATE OF PROGRESS NOTE:    RECERTIFICATION DATE:    PROGRESS:  Pt has attended 9 physical therapy sessions from 17 to date including initial assessment. Sessions consist of range of motion, therapeutic activity, therapeutic exercise, balance and gait refinement. Pt has shown improvement in static standing balance. Pt has increased Alexis Balance Score by 2 points indicating improved static standing balance and decreased risk for fall. Pt has great difficulty following commands for exercise and testing. Pt states he is a little hard of hearing yet he never asks me to repeat myself. Pt did worse on his TUG but I am not sure he was understanding the commands and it was administered twice. Dynamic Gait Index was about the same. Pt has been compliant with his exercises and would benefit from at least one more month to try to maximize functional and balance ability. INITIAL ASSESSMENT:  Mr. Anh Powell presents with increased instance of falling, maybe 2 per month. Pt reports a fall when he was leaning over and one going down steps without lights on which is more of an accident vs balance loss but does speak to decreased sensory awareness with vision removed.   Pt has multiple medical problems contributing to his deficits such as his diabetes, PAD with pain in legs walking 50 - 100', CAD with ~biweekly chest pain needing Nitroglycerin. 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 Pt show decreased command following and motor processing. Pt's biggest complaint to me are his hands - he states he can be holding something and just drop it without knowing. Pt will benefit from OT consult for this. Pt presents with gait and balance deficit and functional hip weakness. Pt will benefit from PT to maximize ability and safety with mobility    PROBLEM LIST (Impacting functional limitations):  1. Decreased Strength  2. Decreased ADL/Functional Activities  3. Decreased Transfer Abilities  4. Decreased Ambulation Ability/Technique  5. Decreased Balance  6. Increased Pain  7. Decreased Activity Tolerance  8. Increased Fatigue  9. Increased Shortness of Breath  10. Decreased Flexibility/Joint Mobility  11. Decreased Knowledge of Precautions  12. Decreased [place] with Home Exercise Program  13. Decreased Cognition INTERVENTIONS PLANNED:  1. Balance Exercise  2. Gait Training  3. Home Exercise Program (HEP)  4. Neuromuscular Re-education/Strengthening  5. Range of Motion (ROM)  6. Therapeutic Activites  7. Therapeutic Exercise/Strengthening  8. Transfer Training  9. possible orthotic consult   TREATMENT PLAN:  Effective Dates: 1/19/17 TO 4/14/17. Frequency/Duration: 2 times a week as able for this patient. (He rarely knows his work schedule more than a couple of day in advance and can be needed to drive nearly any day of the week) for 12 weeks  GOALS: (Goals have been discussed and agreed upon with patient.)  Short-Term Functional Goals: Time Frame: 4 weeks  1.  Pt will be independent with range of motion, strength and balance home exercise program.  STATUS:  MET  Discharge Goals: Time Frame: 8 weeks  Pt will show increased range of motion and improved posture to allow for improved safety and ability with all functional mobility. STATUS:  MET  Pt will decrease TUG score indicating more normalized gait pattern and decrease risk for falls. STATUS:  NOT MET, CONT 4 WEEKS. Pt will increase Alexis Balance Scale to 48/56 indicating increased balance and decreased risk for falls. STATUS:  NOT MET, CONT 4 WEEKS. Pt will increase Dynamic Gait Index to 19/24 indicating increase gait balance and decreased risk for falls. STATUS:  NOT MET, CONT 4 WEEKS. Pt will be able to ambulate increased community distances with least restrictive assistive device independent and safe as evidenced by increased distance on the 6 minute walk test.  STATUS:  NOT TESTED, CONT 4 WEEKS. Rehabilitation Potential For Stated Goals: Fair              HISTORY:   GOAL:  \"To get my fingers working better. Like to walk better. 2/17/17:  \"I feel like I am walking better except for on Monday and Tuesday. \"  Present Symptoms:    Falling 1 - 2 months - Kind of stoop over and put a tag on the car and got right back up. Sometimes I get dizzy when my sugar is low. Check it twice a day. Going down the sairs and I think I tripped. 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 Can drive 4 - 5 hours one way and then come back. History of Present Injury/Illness (Reason for Referral):    Past Medical History/Comorbidities:   Mr. Rita Burks  has a past medical history of Abdominal distension (11/5/2013); Acute gout (11/5/2013); Anemia; Anxiety (5/1/2013); Anxiety and depression (5/1/2013); BPH (benign prostatic hyperplasia) (11/5/2013); Bronchitis (11/5/2013); CAD (coronary artery disease) (11/5/2013); CAD (coronary artery disease) (11/5/2013); Carcinoma, lung ([facility] [address] (8/10/2015); Chronic kidney disease; CKD (chronic kidney disease) (11/5/2013);  Conjunctivitis (11/5/2013); DEMENTIA; Depression; Diabetes ([facility] [address] Diabetes mellitus ([facility] [address] (5/1/2013); DJD (degenerative joint disease) (11/5/2013); Fatty liver (11/7/2013); Fatty liver (11/7/2013); Gall stone (11/5/2013); GERD (gastroesophageal reflux disease) (11/5/2013); GERD (gastroesophageal reflux disease) (11/5/2013); Hepatomegaly (11/5/2013); Hepatomegaly (11/5/2013); Hepatomegaly (11/5/2013); History of GI tumor (11/5/2013); Hypercholesterolemia; Hypertension; Hypoglycemia (11/5/2013); Hypoglycemia (11/5/2013); Insomnia (11/5/2013); Noncompliance (11/5/2013); PAD (peripheral artery disease) ([facility] [address] (11/5/2013); Persistent disorder of initiating or maintaining sleep (2/10/2015); Psychiatric disorder; and Urinary frequency (11/5/2013). He also has no past medical history of Asthma; Autoimmune disease ([facility] [address] Chronic obstructive pulmonary disease ([facility] [address] Congestive heart failure, unspecified; COPD; Heart failure ([facility] [address] Other ill-defined conditions(799.89); PUD (peptic ulcer disease); Seizures ([facility] [address] Stroke [facility]); Thromboembolus ([facility] [address] or Thyroid disease. Mr. Clarice Heard  has a past surgical history that includes colonoscopy; abdomen surgery proc unlisted; and vascular surgery procedure unlist (Right, 2/3/15). Social History/Living Environment:       Social History     Social History    Marital status:      Spouse name: N/A    Number of children: N/A    Years of education: N/A     Occupational History    Not on file. 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   Up to 2 days pers week 4 - 5 hours stints twice a day    Current Medications:    Current Outpatient Prescriptions:   atorvastatin (LIPITOR) 40 mg tablet, 40 mg., Disp: , Rfl:     amLODIPine (NORVASC) 5 mg tablet, , Disp: , Rfl:     BD INSULIN PEN NEEDLE UF ORIG 29 gauge x 1/2\" ndle, , Disp: , Rfl:     BD INSULIN SYRINGE ULTRA-FINE 1 mL 30 gauge x 1/2\" syrg, , Disp: , Rfl:     carvedilol (COREG) 12.5 mg tablet, , Disp: , Rfl:     clopidogrel (PLAVIX) 75 mg tablet, Take 1 Tab by mouth daily. , Disp: 90 Tab, Rfl: 3    furosemide (LASIX) 40 mg tablet, One daily, Disp: 90 Tab, Rfl: 3    potassium chloride (KLOR-CON M20) 20 mEq tablet, Take 1 Tab by mouth daily. , Disp: 90 Tab, Rfl: 4    buPROPion XL (WELLBUTRIN XL) 300 mg XL tablet, Take 1 Tab by mouth daily. , Disp: 90 Tab, Rfl: 3    Ranolazine 1,000 mg Tb12, Take 1,000 mg by mouth two (2) times a day., Disp: , Rfl:     pantoprazole (PROTONIX) 40 mg tablet, TAKE ONE (1) TABLET(S) ONCE DAILY, Disp: 90 Tab, Rfl: 3    insulin glargine (LANTUS) 100 unit/mL injection, by SubCUTAneous route nightly., Disp: , Rfl:     insulin lispro (HUMALOG) 100 unit/mL injection, by SubCUTAneous route., Disp: , Rfl:     cpap machine kit, by Does Not Apply route. autopap 5-20, Disp: , Rfl:     hydrALAZINE (APRESOLINE) 25 mg tablet, Take 25 mg by mouth three (3) times daily. , Disp: , Rfl:     PROAIR HFA 90 mcg/actuation inhaler, , Disp: , Rfl:     ONE TOUCH ULTRA TEST strip, , Disp: , Rfl:     SPIRIVA WITH HANDIHALER 18 mcg inhalation capsule, , Disp: , Rfl:     losartan (COZAAR) 100 mg tablet, Take 100 mg by mouth daily. , Disp: , Rfl:     NEEDLES, INSULIN DISPOSABLE (BD ULTRA-FINE JOSE PEN NEEDLES), by Does Not Apply route., Disp: , Rfl:     nitroglycerin (NITROSTAT) 0.4 mg SL tablet, by SubLINGual route every five (5) minutes as needed for Chest Pain., Disp: , Rfl:     isosorbide mononitrate ER (IMDUR) 30 mg tablet, Take 120 mg by mouth daily. , Disp: , Rfl:     tamsulosin (FLOMAX) 0.4 mg capsule, Take 0.4 mg by mouth daily. , Disp: , Rfl:     ASPIR-81 81 mg TbEC, take 81 mg by mouth daily. , Disp: , Rfl:    Date Last Reviewed:  2/17/2017 Monday and Tuesday I had no balance and I was weak. I had to hold on. Been taking Doxapin for 2 years now but I googled it and it said it could make you weak and dizzy so Dr. Josefina Silva said to Stop taking Doxapin. I stopped Wednesday and I slept like a baby. He said to stop taking it. The side effects can cause weakness and dizziness. He is sending me to Dr. Paolo Myers to further check why I am so weak. Taking a two month leave of absences to see what is going on. Number of Personal Factors/Comorbidities that affect the Plan of Care: 3+: HIGH COMPLEXITY   EXAMINATION:   ROM:          WNL except some tightness in calves right greater than left  Strength:          Good except decreased power in left DF. DF - normal power today. TONGUE dyskinesia. Functional Mobility:         Gait/Ambulation:  Pt ambulates without assistive device and self reports only about 50 - 100' (will try a 6 mintue walk test). Pt shows bilateral heel scuff with small steps (decreased hip and core strength). Transfers:  independent        Bed Mobility:  NT  Mental Status:          Alert and oriented x 4. Occ decreased command following and motor processing. Impulsive. Vision:          Can see to drive without correction in the day. Getting glasses for his night vision. Body Structures Involved:  1. Nerves  2. Heart  3. Lungs  4. Bones  5. Joints  6. Muscles  7. Ligaments Body Functions Affected:  1. Mental  2. Sensory/Pain  3. Cardio  4. Respiratory  5. Neuromusculoskeletal  6. Movement Related Activities and Participation Affected:  1. Learning and Applying Knowledge  2. General Tasks and Demands  3. Mobility  4. Self Care  5. Domestic Life  6. Interpersonal Interactions and Relationships  7.  Community, Social and [place]   Number of elements that affect the Plan of Care: 4+: HIGH COMPLEXITY   CLINICAL PRESENTATION:   Presentation: Evolving clinical presentation with unstable and unpredictable characteristics: HIGH COMPLEXITY   CLINICAL DECISION MAKING:   Outcome Measure: Tool Used: 6-Minute Walk Test   Score:  Initial: 495 Feet = TBD Meters  3 seated rest breaks  Most Recent: TBD Feet = TBD Meters    Interpretation of Score: AGE  GENDER  MEAN  SD  NORMAL RANGE (2SD)    61-76  Male (15)   Female (22)  572   538  80   80  1-0   [phone]    66-77  Male (14)   Female (22)  527   471  80   69  46-65   [phone]    80-80  Male (8)   Female (15)  647   558  38   14  158-499   222-562        Tool Used: Alexis Balance Scale  Score:  Initial: 41/56 43/56  2/17/17   Interpretation of Score: Each section is scored on a 0-4 scale, 0 representing the patients inability to perform the task and 4 representing independence. The scores of each section are added together for a total score of 56. The higher the patients score, the more independent the patient is. Any score below 45 indicates increased risk for falls. Score 56 55-45 44-34 33-23 22-12 11-1 0   Modifier CH CI CJ CK CL CM CN     ? Mobility - Walking and Moving Around:     - CURRENT STATUS: CJ - 20%-39% impaired, limited or restricted    - GOAL STATUS: CI - 1%-19% impaired, limited or restricted    - D/C STATUS:  ---------------To be determined---------------    Tool Used: Dynamic Gait Index  Score:  Initial: 16/24    Interpretation of Score: Each section is scored on a 0-3 scale, 0 representing the patients inability to perform the task and 3 representing independence. The scores of each section are added together for a total score of 24. Any score below 19 indicates increased risk for falls. Score 24 23-19 18-15 14-10 9-5 4-1 0   Modifier CH CI CJ CK CL CM CN     Tool Used: Timed Up and Go (TUG)  Score:  Initial: 12.65 seconds  13.85   2/17/17   Interpretation of Score:  The test measures, in seconds, the time taken by an individual to stand up from a standard arm chair (seat height 46 cm [18 in], arm height 65 cm [25.6 in]), walk a distance of 3 meters (118 in, approx 10 ft), turn, walk back to the chair and sit down. If the individual takes longer than 14 seconds to complete TUG, this indicates risk for falls. Score 7 7.5-10.5 11-14 14.5-17.5 18-21 21.5-24.5 25+   Modifier CH CI CJ CK CL CM CN     Medical Necessity:   · Skilled intervention continues to be required due to [address]. Reason for Services/Other Comments:  · Patient continues to require skilled intervention due to [address] [address] DEFICIT. Use of outcome tool(s) and clinical judgement create a POC that gives a: Difficult prediction of patient's progress: HIGH COMPLEXITY      S: \"I could barely stand up on Monday and Tuesday. Stopped taking medicine. Seeing Dr. Ting Hanson Wednesday next. TREATMENT:   (In addition to Assessment/Re-Assessment sessions the following treatments were rendered)  Therapeutic Exercise: ( 30 minutes):  Exercises per above or grid below to assess and improve mobility, strength and balance. Required moderate visual, verbal and tactile cues to promote proper body alignment and promote proper body mechanics. Progressed complexity of movement as indicated. Date:  2/3/17 Date  2/8/17 Date:  2/9/17 2-13-17   Activity/Exercise       Nu Step Arms at 13  Seat at 13  Level 1  Time: 5:00 minutes legs started hurting. Arms at 13  Seat at 13  Level 1  Time: 5:00 minutes no leg pain  Physiostep   No arms  Level 1  Had to get him to SLOW down  1 resting break   Heel/toe raises x20  x20 X 20   Standing lateral foot raises x20.  x20 X 20    Hip circles Very difficult still for pt. Pt rotate feet and hips vs circling around feet.     -    Seated ankle pumps x20  x20 X 20 B   Standing hip extension  2 x 10 with yellow band 20 repetitions with yellow theraband resistance  2 x 10 with yellow band Yellow   2 x 10 B    Standing hip abduction 2 x 10 with yellow band  2 x 10 with yellow band Yellow   2 x 10 B   Single leg stance 3 x 10 seconds. Difficulty mirroring PT or following commands. 3 x 10 seconds each leg 5 x 10 sec hold   Decreasing hand hold to tapping rail   Standing forward/back bend -   X 10 with cues to SLOW down  And CGA   Calf stretch on incline board    5 x 20 sec      Pre-Treatment Symptoms: Helen Showers. reports no pain prior to therapy. Treatment/Session Assessment:  Helen Smith. requires lots of cues to slow down and for technique. He reports he needs lots of rest for a 78year old man. No fatigue noted. Feels he is getting stronger. · Post session pain:  No change in pain level  · Compliance with Program/Exercises: Will assess as treatment progresses. · Recommendations/Intent for next treatment session: \"Next visit will focus on progressive high level balance and LE sensorimotor awareness stretches and exercises. \".      PT Patient Time In/Time Out  Time In: 1310  Time Out: Vamsi Bettencourt [place]

## 2017-02-20 NOTE — PROGRESS NOTES
Dany Cervantes. : [facility] [address]  [phone]   [phone]         OUTPATIENT OCCUPATIONAL THERAPY: Initial Assessment 17   ICD-10: Treatment Diagnosis: Other lack of coordination (R27.8)  Repeated falls (R29.6)  Precautions/Allergies:   Pcn [penicillins]   Fall Risk Score: 10 (? 5 = High Risk)  MD Orders: OT evaluate and treat MEDICAL/REFERRING DIAGNOSIS:   history of falls  DATE OF ONSET: 3 months ago   REFERRING PHYSICIAN: Isabel Oliver MD  RETURN PHYSICIAN APPOINTMENT: TBD     INITIAL ASSESSMENT:  Mr. Sudeep Mancilla presents to occupational therapy services with hx of decreased function of B UEs with c/o frequently dropping objects. Pt also has recent hx of frequent falls and is being seen by physical therapy as well. Pt does have some impaired gross and fine motor coordination in B UE, but especially in the L UE. Pt is pleasant and cooperative, but does appear to have some decreased attention with tasks and decreased command following with activities. Pt also has some decreased strength in the L hand compared to the R hand. Pt also has diabetes with hx of some sensory loss in B hands. Pt will benefit from OT services to address stated goals and plan of care. PLAN OF CARE:   PROBLEM LIST:  1. Decreased ADL/Functional Activities  2. Decreased Ambulation Ability/Technique  3. Decreased Balance  4. Decreased [place] with Home Exercise Program  5. Decreased Cognition  6. Impaired coordination INTERVENTIONS PLANNED:  1. Activities of daily living training  2. Adaptive equipment training  3. Balance training  4. Clothing management  5. Cognitive training  6. Neuromuscular re-eduation  7. Theraputic activity  8. Theraputic exercise  9.  Sensory Re-education    TREATMENT PLAN:  Effective Dates: 17 TO 17  Frequency/Duration: 2 times a week for 8 weeks  GOALS: (Goals have been discussed and agreed upon with patient.)  Short-Term Functional Goals: Time Frame:   1. Marysol Pollack. will complete 15 minutes of coordination activities for B UE with minimal cues to improve coordination for functional activity. 2. Marysol Pollack. will complete HEP with minimal cues to improve strength/coordination for ADL. [place]. with follow simple multiple step commands with activities with no cues to improve command following/attending to directions for daily activity. 4. Marysol Pollack. will complete self-feeding tasks including management of drinks and opening packages with modified independence. Discharge Goals: Time Frame:   1. Samariae Pollack. will complete 30 minutes of B UE coordination exercises/activities with supervision to improve functional use of hands for daily activity. 2. Samariae Pollack. will be modified independent with HEP to improve strength/coordination of B UEs. 3. Marysol Pollack. will demonstrate improved coordination in B hands as evidenced by ability to complete 9-hole pegboard in 30 seconds or less to show improved hand function for daily activity. Brad. will report 75% decrease in dropping objects with daily activity to improve hand function/strength for daily activity. Rehabilitation Potential For Stated Goals: Good  Regarding Ronnell De Dios Jr.'s therapy, I certify that the treatment plan above will be carried out by a therapist or under their direction. Thank you for this referral,  Blanka Redmond, OT     Referring Physician Signature: Oscar Up MD _________________________  Date _________            The information in this section was collected on 2/17/17 (except where otherwise noted). OCCUPATIONAL PROFILE & HISTORY:   History of Present Injury/Illness (Reason for Referral):  Patient reports frequently dropping small objects such as car keys, dropping utensils when he is eating. States that this all began 3 months ago. Pt is currently seeing PT for falls (last fall 3 months ago). Pt reports his hands don't feel stiff, numb, or weak. Pt is diabetic and is taking two doses of insulin per day. No hx of injury to either UE per patient report. Past Medical History/Comorbidities:   Mr. Orin Colindres  has a past medical history of Abdominal distension (11/5/2013); Acute gout (11/5/2013); Anemia; Anxiety (5/1/2013); Anxiety and depression (5/1/2013); BPH (benign prostatic hyperplasia) (11/5/2013); Bronchitis (11/5/2013); CAD (coronary artery disease) (11/5/2013); CAD (coronary artery disease) (11/5/2013); Carcinoma, lung ([facility] (8/10/2015); Chronic kidney disease; CKD (chronic kidney disease) (11/5/2013); Conjunctivitis (11/5/2013); DEMENTIA; Depression; Diabetes ([facility] Diabetes mellitus ([facility] (5/1/2013); DJD (degenerative joint disease) (11/5/2013); Fatty liver (11/7/2013); Fatty liver (11/7/2013); Gall stone (11/5/2013); GERD (gastroesophageal reflux disease) (11/5/2013); GERD (gastroesophageal reflux disease) (11/5/2013); Hepatomegaly (11/5/2013); Hepatomegaly (11/5/2013); Hepatomegaly (11/5/2013); History of GI tumor (11/5/2013); Hypercholesterolemia; Hypertension; Hypoglycemia (11/5/2013); Hypoglycemia (11/5/2013); Insomnia (11/5/2013); Noncompliance (11/5/2013); PAD (peripheral artery disease) ([address] (11/5/2013); Persistent disorder of initiating or maintaining sleep (2/10/2015); Psychiatric disorder; and Urinary frequency (11/5/2013). He also has no past medical history of Asthma; Autoimmune disease ([address] Chronic obstructive pulmonary disease ([address] Congestive heart failure, unspecified; COPD; Heart failure ([address] Other ill-defined conditions(799.89); PUD (peptic ulcer disease); Seizures ([facility] Stroke [facility]); Thromboembolus ([facility] or Thyroid disease. Mr. Orin Colindres  has a past surgical history that includes colonoscopy; abdomen surgery proc unlisted; and vascular surgery procedure unlist (Right, 2/3/15).   Social History/Living Environment:     Pt lives with wife. Pt has three adult children that live in [place], [place]. No assistive devices. Independent with ADL. Pt has a walker, walkin shower with grab with shower chair. 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  Prior Level of Function/Work/Activity:  Independent and working 3 days per week  Personal Factors:          Overall Behavior:  Cooperative, pleasant, decreased command following with activity   Current Medications:    Current Outpatient Prescriptions:     atorvastatin (LIPITOR) 40 mg tablet, 40 mg., Disp: , Rfl:     amLODIPine (NORVASC) 5 mg tablet, , Disp: , Rfl:     BD INSULIN PEN NEEDLE UF ORIG 29 gauge x 1/2\" ndle, , Disp: , Rfl:     BD INSULIN SYRINGE ULTRA-FINE 1 mL 30 gauge x 1/2\" syrg, , Disp: , Rfl:     carvedilol (COREG) 12.5 mg tablet, , Disp: , Rfl:     clopidogrel (PLAVIX) 75 mg tablet, Take 1 Tab by mouth daily. , Disp: 90 Tab, Rfl: 3    furosemide (LASIX) 40 mg tablet, One daily, Disp: 90 Tab, Rfl: 3    potassium chloride (KLOR-CON M20) 20 mEq tablet, Take 1 Tab by mouth daily. , Disp: 90 Tab, Rfl: 4    buPROPion XL (WELLBUTRIN XL) 300 mg XL tablet, Take 1 Tab by mouth daily. , Disp: 90 Tab, Rfl: 3    Ranolazine 1,000 mg Tb12, Take 1,000 mg by mouth two (2) times a day., Disp: , Rfl:     pantoprazole (PROTONIX) 40 mg tablet, TAKE ONE (1) TABLET(S) ONCE DAILY, Disp: 90 Tab, Rfl: 3    insulin glargine (LANTUS) 100 unit/mL injection, by SubCUTAneous route nightly., Disp: , Rfl:     insulin lispro (HUMALOG) 100 unit/mL injection, by SubCUTAneous route., Disp: , Rfl:     cpap machine kit, by Does Not Apply route. autopap 5-20, Disp: , Rfl:     hydrALAZINE (APRESOLINE) 25 mg tablet, Take 25 mg by mouth three (3) times daily. , Disp: , Rfl:     PROAIR HFA 90 mcg/actuation inhaler, , Disp: , Rfl:     ONE TOUCH ULTRA TEST strip, , Disp: , Rfl:     SPIRIVA WITH HANDIHALER 18 mcg inhalation capsule, , Disp: , Rfl:     losartan (COZAAR) 100 mg tablet, Take 100 mg by mouth daily. , Disp: , Rfl:     NEEDLES, INSULIN DISPOSABLE (BD ULTRA-FINE JOSE PEN NEEDLES), by Does Not Apply route., Disp: , Rfl:     nitroglycerin (NITROSTAT) 0.4 mg SL tablet, by SubLINGual route every five (5) minutes as needed for Chest Pain., Disp: , Rfl:     isosorbide mononitrate ER (IMDUR) 30 mg tablet, Take 120 mg by mouth daily. , Disp: , Rfl:     tamsulosin (FLOMAX) 0.4 mg capsule, Take 0.4 mg by mouth daily. , Disp: , Rfl:     ASPIR-81 81 mg TbEC, take 81 mg by mouth daily. , Disp: , Rfl:             Date Last Reviewed:  2/17/17   Complexity Level : Expanded review of therapy/medical records (1-2):  MODERATE COMPLEXITY   ASSESSMENT OF OCCUPATIONAL PERFORMANCE:     ROM/Strength:     Date:  2/17/17 Date:  2/17/17  Date:  2/17/17 Date:  2/17/17    AROM AROM  STRENGTH STRENGTH   Movement RIGHT LEFT  RIGHT LEFT   SHOULDER:        Flexion  WNL WNL  5 5   Abduction  WNL WNL  5 5   ELBOW:        Flexion  WNL WNL  5 5   Extension  WNL WNL  5 5   WRIST:        Wrist extension  WNL WNL  5 5   HAND:        Hand Strength:            82 lbs 67 lbs    THREE JAW SHANNA PINCH    unable unable    LATERAL PINCH     14 lbs  11 lbs      Functional Mobility:         Gait/Ambulation:  No assistive device        Transfers:  Supervision to modified independence   Coordination: Decreased coordination with rapid alternating movements         Coordination: Tool Used: Five Time Finger to Nose  Score:  Initial: R: 5 (s) L: 5 (s) Most Recent: TBD   Interpretation of Score: Dysmetria to the L of nose with L UE        Tool Used: 9-hole pegboard    Score:  Initial: R: 31 seconds (dropped pegs x 2)   L: 68 seconds and assists with R hand 75% of the time Most Recent: TBD   Interpretation of Score:Impaired coordination bilaterally (L>R)     Tool Used:  [place]  Test  Score:  Initial: R: 28 (s) L: 41 (s) Most Recent: TBD   Interpretation of Score: Impaired bilaterally (L>R)       Sensation: Reports occasional numbness in L thumb         [place] Monofil[place]:  2.83: Only able to identify with L thumb  3.61: Able to identify 100% bilaterally    Balance:          Frequent falls; See PT chart  Activities of Daily Living:           Basic ADLs (From Assessment) Complex ADLs (From Assessment)   Basic ADL  Feeding: Stand-by assistance  Oral Facial Hygiene/Grooming: Independent  Bathing: Modified independent  Upper Body Dressing: Modified independent  Lower Body Dressing: Minimum assistance  Toileting: Modified independent Instrumental ADL  Homemaking:  (Hired help)   Grooming/Bathing/Dressing Activities of Daily Living                                        Physical Skills Involved:  1. Balance  2. Mobility  3. Fine or Gross Motor Coordination  4. Sensation  5. Dexterity Cognitive Skills Affected (resulting in the inability to perform in a timely and safe manner):  1. Attending  2. Understanding  3. Problem Solving  4. Mental Sequencing Psychosocial Skills Affected:  1. Routines and Behaviors   Number of elements that affect the Plan of Care: 5+:  HIGH COMPLEXITY   CLINICAL DECISION MAKING:   Outcome Measure: Tool Used: [address] [id number] AM-[facility] Daily Activity Outpatient Short Form  Score:  Initial: 40 Most Recent: X (Date: -- )   Interpretation of Tool:  Represents clinically-significant functional categories (i.e., basic and instrumental activities of daily living, fine motor activities). Score 60 59-55 54-47 46-34 32-21 20-16 15   Modifier CH CI CJ CK CL CM CN     ?  Carrying, Moving, and Handling Objects:     - CURRENT STATUS: CK - 40%-59% impaired, limited or restricted    - GOAL STATUS: CJ - 20%-39% impaired, limited or restricted    - D/C STATUS:  ---------------To be determined---------------    Medical Necessity:   · Patient demonstrates excellent rehab potential due to higher previous functional level. Reason for Services/Other Comments:  · Patient continues to require skilled intervention due to decreased functional use of B hands with daily activity. Clinical Decision-Making Assessment:     Use of outcome tool(s) and clinical judgement create a POC that gives a: Questionable prediction of patient's progress: MODERATE COMPLEXITY   TREATMENT:   (In addition to Assessment/Re-Assessment sessions the following treatments were rendered)    Pre-treatment Symptoms/Complaints:  No complaints  Pain: Initial:     0/10 Post Session:  0/10     Assessment/Reassessment only, no treatment provided today      Treatment/Session Assessment:    · Response to Treatment:  Evaluation only. · Compliance with Program/Exercises: Will assess as treatment progresses. · Recommendations/Intent for next treatment session: \"Next visit will focus on advancements to more challenging activities and reduction in assistance provided\".   Total Treatment Duration:  OT Patient Time In/Time Out  Time In: 1345  Time Out: Yao 48, OT

## 2017-02-23 ENCOUNTER — APPOINTMENT (OUTPATIENT)
Dept: PHYSICAL THERAPY | Age: 77
End: 2017-02-23
Attending: INTERNAL MEDICINE
Payer: COMMERCIAL

## 2017-02-24 ENCOUNTER — HOSPITAL ENCOUNTER (OUTPATIENT)
Dept: PHYSICAL THERAPY | Age: 77
Discharge: HOME | End: 2017-02-24
Attending: INTERNAL MEDICINE
Payer: COMMERCIAL

## 2017-02-24 NOTE — PROGRESS NOTES
Cecil Farmer. : 1940 [facility] [place] [address] [address] [facility]  [phone]   [phone]          OUTPATIENT PHYSICAL THERAPY:Progress Report 2017    ICD-10: Treatment Diagnosis: Repeated falls (R29.6)  Unsteadiness on feet (R26.81)  Unspecified lack of coordination (R27.9)  Precautions/Allergies:   Pcn [penicillins] Nitroglycerin with pt at all times - CP maybe every 2 weeks with strenuous walking or activity. Fall Risk Score: 10 (? 5 = High Risk)  MD Orders: Outpatient PT referral MEDICAL/REFERRING DIAGNOSIS:  history of falls   DATE OF ONSET: Few months  REFERRING PHYSICIAN: Kirk Ross MD  RETURN PHYSICIAN APPOINTMENT: unknown  DATE OF PROGRESS NOTE:  3/42/31  RECERTIFICATION DATE:    PROGRESS:  Pt has attended 9 physical therapy sessions from 17 to date including initial assessment. Sessions consist of range of motion, therapeutic activity, therapeutic exercise, balance and gait refinement. Pt has shown improvement in static standing balance. Pt has increased Alexis Balance Score by 2 points indicating improved static standing balance and decreased risk for fall. Pt has great difficulty following commands for exercise and testing. Pt states he is a little hard of hearing yet he never asks me to repeat myself. Pt did worse on his TUG but I am not sure he was understanding the commands and it was administered twice. Dynamic Gait Index was about the same. Pt has been compliant with his exercises and would benefit from at least one more month to try to maximize functional and balance ability. INITIAL ASSESSMENT:  Mr. Kristin Jones presents with increased instance of falling, maybe 2 per month. Pt reports a fall when he was leaning over and one going down steps without lights on which is more of an accident vs balance loss but does speak to decreased sensory awareness with vision removed.   Pt has multiple medical problems contributing to his deficits such as his diabetes, PAD with pain in legs walking 50 - 100', CAD with ~biweekly chest pain needing Nitroglycerin. 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 Pt show decreased command following and motor processing. Pt's biggest complaint to me are his hands - he states he can be holding something and just drop it without knowing. Pt will benefit from OT consult for this. Pt presents with gait and balance deficit and functional hip weakness. Pt will benefit from PT to maximize ability and safety with mobility    PROBLEM LIST (Impacting functional limitations):  1. Decreased Strength  2. Decreased ADL/Functional Activities  3. Decreased Transfer Abilities  4. Decreased Ambulation Ability/Technique  5. Decreased Balance  6. Increased Pain  7. Decreased Activity Tolerance  8. Increased Fatigue  9. Increased Shortness of Breath  10. Decreased Flexibility/Joint Mobility  11. Decreased Knowledge of Precautions  12. Decreased [place] with Home Exercise Program  13. Decreased Cognition INTERVENTIONS PLANNED:  1. Balance Exercise  2. Gait Training  3. Home Exercise Program (HEP)  4. Neuromuscular Re-education/Strengthening  5. Range of Motion (ROM)  6. Therapeutic Activites  7. Therapeutic Exercise/Strengthening  8. Transfer Training  9. possible orthotic consult   TREATMENT PLAN:  Effective Dates: 1/19/17 TO 4/14/17. Frequency/Duration: 2 times a week as able for this patient. (He rarely knows his work schedule more than a couple of day in advance and can be needed to drive nearly any day of the week) for 12 weeks  GOALS: (Goals have been discussed and agreed upon with patient.)  Short-Term Functional Goals: Time Frame: 4 weeks  1.  Pt will be independent with range of motion, strength and balance home exercise program.  STATUS:  MET  Discharge Goals: Time Frame: 8 weeks  Pt will show increased range of motion and improved posture to allow for improved safety and ability with all functional mobility. STATUS:  MET  Pt will decrease TUG score indicating more normalized gait pattern and decrease risk for falls. STATUS:  NOT MET, CONT 4 WEEKS. Pt will increase Alexis Balance Scale to 48/56 indicating increased balance and decreased risk for falls. STATUS:  NOT MET, CONT 4 WEEKS. Pt will increase Dynamic Gait Index to 19/24 indicating increase gait balance and decreased risk for falls. STATUS:  NOT MET, CONT 4 WEEKS. Pt will be able to ambulate increased community distances with least restrictive assistive device independent and safe as evidenced by increased distance on the 6 minute walk test.  STATUS:  NOT TESTED, CONT 4 WEEKS. Rehabilitation Potential For Stated Goals: Fair              HISTORY:   GOAL:  \"To get my fingers working better. Like to walk better. 2/17/17:  \"I feel like I am walking better except for on Monday and Tuesday. \"  Present Symptoms:    Falling 1 - 2 months - Kind of stoop over and put a tag on the car and got right back up. Sometimes I get dizzy when my sugar is low. Check it twice a day. Going down the sairs and I think I tripped. 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 I don't know exactaly what days of the week I am gone. Can drive 4 - 5 hours one way and then come back. History of Present Injury/Illness (Reason for Referral):    Past Medical History/Comorbidities:   Mr. Mary Carmen Cano  has a past medical history of Abdominal distension (11/5/2013); Acute gout (11/5/2013); Anemia; Anxiety (5/1/2013); Anxiety and depression (5/1/2013); BPH (benign prostatic hyperplasia) (11/5/2013); Bronchitis (11/5/2013); CAD (coronary artery disease) (11/5/2013); CAD (coronary artery disease) (11/5/2013); Carcinoma, lung ([facility] [address] [facility] (8/10/2015); Chronic kidney disease; CKD (chronic kidney disease) (11/5/2013);  Conjunctivitis (11/5/2013); DEMENTIA; Depression; Diabetes ([facility] Diabetes mellitus ([facility] (5/1/2013); DJD (degenerative joint disease) (11/5/2013); Fatty liver (11/7/2013); Fatty liver (11/7/2013); Gall stone (11/5/2013); GERD (gastroesophageal reflux disease) (11/5/2013); GERD (gastroesophageal reflux disease) (11/5/2013); Hepatomegaly (11/5/2013); Hepatomegaly (11/5/2013); Hepatomegaly (11/5/2013); History of GI tumor (11/5/2013); Hypercholesterolemia; Hypertension; Hypoglycemia (11/5/2013); Hypoglycemia (11/5/2013); Insomnia (11/5/2013); Noncompliance (11/5/2013); PAD (peripheral artery disease) ([facility] (11/5/2013); Persistent disorder of initiating or maintaining sleep (2/10/2015); Psychiatric disorder; and Urinary frequency (11/5/2013). He also has no past medical history of Heart failure ([facility] or Other ill-defined conditions(799.89). Mr. Sudeep Mancilla  has a past surgical history that includes colonoscopy; abdomen surgery proc unlisted; and vascular surgery procedure unlist (Right, 2/3/15). Social History/Living Environment:       Social History     Social History    Marital status:      Spouse name: N/A    Number of children: N/A    Years of education: N/A     Occupational History    Not on file. 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   Up to 2 days pers week 4 - 5 hours stints twice a day    Current Medications:    Current Outpatient Prescriptions:     atorvastatin (LIPITOR) 40 mg tablet, 40 mg., Disp: , Rfl:     amLODIPine (NORVASC) 5 mg tablet, , Disp: , Rfl:     BD INSULIN PEN NEEDLE UF ORIG 29 gauge x 1/2\" ndle, , Disp: , Rfl:     BD INSULIN SYRINGE ULTRA-FINE 1 mL 30 gauge x 1/2\" syrg, , Disp: , Rfl:     carvedilol (COREG) 12.5 mg tablet, , Disp: , Rfl:     clopidogrel (PLAVIX) 75 mg tablet, Take 1 Tab by mouth daily. , Disp: 90 Tab, Rfl: 3    furosemide (LASIX) 40 mg tablet, One daily, Disp: 90 Tab, Rfl: 3    potassium chloride (KLOR-CON M20) 20 mEq tablet, Take 1 Tab by mouth daily. , Disp: 90 Tab, Rfl: 4    buPROPion XL (WELLBUTRIN XL) 300 mg XL tablet, Take 1 Tab by mouth daily. , Disp: 90 Tab, Rfl: 3    Ranolazine 1,000 mg Tb12, Take 1,000 mg by mouth two (2) times a day., Disp: , Rfl:     pantoprazole (PROTONIX) 40 mg tablet, TAKE ONE (1) TABLET(S) ONCE DAILY, Disp: 90 Tab, Rfl: 3    insulin glargine (LANTUS) 100 unit/mL injection, by SubCUTAneous route nightly., Disp: , Rfl:     insulin lispro (HUMALOG) 100 unit/mL injection, by SubCUTAneous route., Disp: , Rfl:     cpap machine kit, by Does Not Apply route. autopap 5-20, Disp: , Rfl:     hydrALAZINE (APRESOLINE) 25 mg tablet, Take 25 mg by mouth three (3) times daily. , Disp: , Rfl:     PROAIR HFA 90 mcg/actuation inhaler, , Disp: , Rfl:     ONE TOUCH ULTRA TEST strip, , Disp: , Rfl:     SPIRIVA WITH HANDIHALER 18 mcg inhalation capsule, , Disp: , Rfl:     losartan (COZAAR) 100 mg tablet, Take 100 mg by mouth daily. , Disp: , Rfl:     NEEDLES, INSULIN DISPOSABLE (BD ULTRA-FINE JOSE PEN NEEDLES), by Does Not Apply route., Disp: , Rfl:     nitroglycerin (NITROSTAT) 0.4 mg SL tablet, by SubLINGual route every five (5) minutes as needed for Chest Pain., Disp: , Rfl:     isosorbide mononitrate ER (IMDUR) 30 mg tablet, Take 120 mg by mouth daily. , Disp: , Rfl:     tamsulosin (FLOMAX) 0.4 mg capsule, Take 0.4 mg by mouth daily. , Disp: , Rfl:     ASPIR-81 81 mg TbEC, take 81 mg by mouth daily. , Disp: , Rfl:    Date Last Reviewed:  2/24/2017 Monday and Tuesday I had no balance and I was weak. I had to hold on.   Been taking Doxapin for 2 years now but I googled it and it said it could make you weak and dizzy so Dr. Miriam Sidhu said to Stop taking Doxapin. I stopped Wednesday and I slept like a baby. He said to stop taking it. The side effects can cause weakness and dizziness. He is sending me to Dr. Ed Ivey to further check why I am so weak. Taking a two month leave of absences to see what is going on. Number of Personal Factors/Comorbidities that affect the Plan of Care: 3+: HIGH COMPLEXITY   EXAMINATION:   ROM:          WNL except some tightness in calves right greater than left  Strength:          Good except decreased power in left DF. DF - normal power today. TONGUE dyskinesia. Functional Mobility:         Gait/Ambulation:  Pt ambulates without assistive device and self reports only about 50 - 100' (will try a 6 mintue walk test). Pt shows bilateral heel scuff with small steps (decreased hip and core strength). Transfers:  independent        Bed Mobility:  NT  Mental Status:          Alert and oriented x 4. Occ decreased command following and motor processing. Impulsive. Vision:          Can see to drive without correction in the day. Getting glasses for his night vision. Body Structures Involved:  1. Nerves  2. Heart  3. Lungs  4. Bones  5. Joints  6. Muscles  7. Ligaments Body Functions Affected:  1. Mental  2. Sensory/Pain  3. Cardio  4. Respiratory  5. Neuromusculoskeletal  6. Movement Related Activities and Participation Affected:  1. Learning and Applying Knowledge  2. General Tasks and Demands  3. Mobility  4. Self Care  5. Domestic Life  6. Interpersonal Interactions and Relationships  7. Community, Social and [place]   Number of elements that affect the Plan of Care: 4+: HIGH COMPLEXITY   CLINICAL PRESENTATION:   Presentation: Evolving clinical presentation with unstable and unpredictable characteristics: HIGH COMPLEXITY   CLINICAL DECISION MAKING:   Outcome Measure:       Tool Used: 6-Minute Walk Test   Score:  Initial: 495 Feet = TBD Meters  3 seated rest breaks  Most Recent: TBD Feet = TBD Meters    Interpretation of Score: AGE  GENDER  MEAN  SD  NORMAL RANGE (2SD)    61-76  Male (15)   Female (22)  572   538  80   80  1-0   [phone]    66-77  Male (14)   Female (22)  527   471  80   69  46-65   [phone]    80-80  Male (8)   Female (15)  165   954  28   75  735-718   222-562        Tool Used: Alexis Balance Scale  Score:  Initial: 41/56 43/56  2/17/17   Interpretation of Score: Each section is scored on a 0-4 scale, 0 representing the patients inability to perform the task and 4 representing independence. The scores of each section are added together for a total score of 56. The higher the patients score, the more independent the patient is. Any score below 45 indicates increased risk for falls. Score 56 55-45 44-34 33-23 22-12 11-1 0   Modifier CH CI CJ CK CL CM CN     ? Mobility - Walking and Moving Around:     - CURRENT STATUS: CJ - 20%-39% impaired, limited or restricted    - GOAL STATUS: CI - 1%-19% impaired, limited or restricted    - D/C STATUS:  ---------------To be determined---------------    Tool Used: Dynamic Gait Index  Score:  Initial: 16/24    Interpretation of Score: Each section is scored on a 0-3 scale, 0 representing the patients inability to perform the task and 3 representing independence. The scores of each section are added together for a total score of 24. Any score below 19 indicates increased risk for falls. Score 24 23-19 18-15 14-10 9-5 4-1 0   Modifier CH CI CJ CK CL CM CN     Tool Used: Timed Up and Go (TUG)  Score:  Initial: 12.65 seconds  13.85   2/17/17   Interpretation of Score: The test measures, in seconds, the time taken by an individual to stand up from a standard arm chair (seat height 46 cm [18 in], arm height 65 cm [25.6 in]), walk a distance of 3 meters (118 in, approx 10 ft), turn, walk back to the chair and sit down.   If the individual takes longer than 14 seconds to complete TUG, this indicates risk for falls. Score 7 7.5-10.5 11-14 14.5-17.5 18-21 21.5-24.5 25+   Modifier CH CI CJ CK CL CM CN     Medical Necessity:   · Skilled intervention continues to be required due to [address]. Reason for Services/Other Comments:  · Patient continues to require skilled intervention due to [id number] [address] DEFICIT. Use of outcome tool(s) and clinical judgement create a POC that gives a: Difficult prediction of patient's progress: HIGH COMPLEXITY      S: \"Dr. Moriah Zamora cleared me. He thinks it was the medicine. No pain - unless I get to walking. TREATMENT:   (In addition to Assessment/Re-Assessment sessions the following treatments were rendered)  Therapeutic Exercise: ( 30 minutes):  Exercises per above or grid below to assess and improve mobility, strength and balance. Required moderate visual, verbal and tactile cues to promote proper body alignment and promote proper body mechanics. Progressed complexity of movement as indicated. THERAPEUTIC ACTIVITY: ( 20 minutes): Therapeutic activities per grid below to improve mobility, strength and balance. Required minimal verbal and tactile cues to to perform correctly and safely. Magdaleno Adhikari 2-24-17   Activity/Exercise    Nu Step Physiostep   Arms  Level 3  5 minutes - knee started to hurt but not calves   Heel/toe raises X 20   Standing lateral foot raises X 20    Hip circles    Seated ankle pumps X 20 B   Standing hip extension  Yellow   2 x 10 B    Standing hip abduction Yellow   x20 B   Forward lunge step - targets for feet. Large step length x20 each leg. Left leg forward better technique. Right leg forward no lunge with right leg.      Single leg stance 3 x 10 sec hold   Decreasing hand hold to tapping rail   Standing forward/back bend X 10 with cues to SLOW down  And CGA   Calf stretch on incline board 5 x 20 sec    [place] walking 4 x 15'  Encouraging pt to take full steps and bring GIOVANNA over center vs. post Pre-Treatment Symptoms: Paty Schmid reports no pain prior to therapy. No increase in pain by end of session. Treatment/Session Assessment:  Paty Schmid feels much better now that he is off Doxapin. Still SOB with minimal activities - reports cardiac blockage. Stronger. Working towards larger steps for better balance. .    · Post session pain:  No change in pain level  · Compliance with Program/Exercises: Will assess as treatment progresses. · Recommendations/Intent for next treatment session: \"Next visit will focus on progressive high level balance and LE sensorimotor awareness stretches and exercises. \".      PT Patient Time In/Time Out  Time In: 1310  Time Out: [address] [place], [place]

## 2017-02-28 ENCOUNTER — HOSPITAL ENCOUNTER (OUTPATIENT)
Dept: PHYSICAL THERAPY | Age: 77
Discharge: HOME | End: 2017-02-28
Attending: INTERNAL MEDICINE
Payer: COMMERCIAL

## 2017-03-03 ENCOUNTER — HOSPITAL ENCOUNTER (OUTPATIENT)
Dept: PHYSICAL THERAPY | Age: 77
Discharge: HOME | End: 2017-03-03
Attending: INTERNAL MEDICINE
Payer: COMMERCIAL

## 2017-03-03 PROCEDURE — 97110 THERAPEUTIC EXERCISES: CPT

## 2017-03-03 PROCEDURE — 97530 THERAPEUTIC ACTIVITIES: CPT

## 2017-03-03 PROCEDURE — 97112 NEUROMUSCULAR REEDUCATION: CPT

## 2017-03-03 NOTE — PROGRESS NOTES
Kingston Schuler. : 1940 [facility] [place] [address] [address] [facility]  [phone]   \[facility]\"":[phone]          OUTPATIENT PHYSICAL THERAPY:Progress Report 3/3/2017    ICD-10: Treatment Diagnosis: Repeated falls (R29.6)  Unsteadiness on feet (R26.81)  Unspecified lack of coordination (R27.9)  Precautions/Allergies:   Pcn [penicillins] Nitroglycerin with pt at all times - CP maybe every 2 weeks with strenuous walking or activity. Fall Risk Score: 10 (? 5 = High Risk)  MD Orders: Outpatient PT referral MEDICAL/REFERRING DIAGNOSIS:  history of falls   DATE OF ONSET: Few months  REFERRING PHYSICIAN: Leydi Crockett MD  RETURN PHYSICIAN APPOINTMENT: unknown  DATE OF PROGRESS NOTE:  39  RECERTIFICATION DATE:    PROGRESS:  Pt has attended 9 physical therapy sessions from 17 to date including initial assessment. Sessions consist of range of motion, therapeutic activity, therapeutic exercise, balance and gait refinement. Pt has shown improvement in static standing balance. Pt has increased Alexis Balance Score by 2 points indicating improved static standing balance and decreased risk for fall. Pt has great difficulty following commands for exercise and testing. Pt states he is a little hard of hearing yet he never asks me to repeat myself. Pt did worse on his TUG but I am not sure he was understanding the commands and it was administered twice. Dynamic Gait Index was about the same. Pt has been compliant with his exercises and would benefit from at least one more month to try to maximize functional and balance ability. INITIAL ASSESSMENT:  Mr. Jovany Paige presents with increased instance of falling, maybe 2 per month. Pt reports a fall when he was leaning over and one going down steps without lights on which is more of an accident vs balance loss but does speak to decreased sensory awareness with vision removed.   Pt has multiple medical problems contributing to his deficits such as his diabetes, PAD with pain in legs walking 50 - 100', CAD with ~biweekly chest pain needing Nitroglycerin. 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 Pt show decreased command following and motor processing. Pt's biggest complaint to me are his hands - he states he can be holding something and just drop it without knowing. Pt will benefit from OT consult for this. Pt presents with gait and balance deficit and functional hip weakness. Pt will benefit from PT to maximize ability and safety with mobility    PROBLEM LIST (Impacting functional limitations):  1. Decreased Strength  2. Decreased ADL/Functional Activities  3. Decreased Transfer Abilities  4. Decreased Ambulation Ability/Technique  5. Decreased Balance  6. Increased Pain  7. Decreased Activity Tolerance  8. Increased Fatigue  9. Increased Shortness of Breath  10. Decreased Flexibility/Joint Mobility  11. Decreased Knowledge of Precautions  12. Decreased Walker with Home Exercise Program  13. Decreased Cognition INTERVENTIONS PLANNED:  1. Balance Exercise  2. Gait Training  3. Home Exercise Program (HEP)  4. Neuromuscular Re-education/Strengthening  5. Range of Motion (ROM)  6. Therapeutic Activites  7. Therapeutic Exercise/Strengthening  8. Transfer Training  9. possible orthotic consult   TREATMENT PLAN:  Effective Dates: 1/19/17 TO 4/14/17. Frequency/Duration: 2 times a week as able for this patient. (He rarely knows his work schedule more than a couple of day in advance and can be needed to drive nearly any day of the week) for 12 weeks  GOALS: (Goals have been discussed and agreed upon with patient.)  Short-Term Functional Goals: Time Frame: 4 weeks  1.  Pt will be independent with range of motion, strength and balance home exercise program.  STATUS:  MET  Discharge Goals: Time Frame: 8 weeks  Pt will show increased range of motion and improved posture to allow for improved safety and ability with all functional mobility. STATUS:  MET  Pt will decrease TUG score indicating more normalized gait pattern and decrease risk for falls. STATUS:  NOT MET, CONT 4 WEEKS. Pt will increase Alexis Balance Scale to 48/56 indicating increased balance and decreased risk for falls. STATUS:  NOT MET, CONT 4 WEEKS. Pt will increase Dynamic Gait Index to 19/24 indicating increase gait balance and decreased risk for falls. STATUS:  NOT MET, CONT 4 WEEKS. Pt will be able to ambulate increased community distances with least restrictive assistive device independent and safe as evidenced by increased distance on the 6 minute walk test.  STATUS:  NOT TESTED, CONT 4 WEEKS. Rehabilitation Potential For Stated Goals: Fair              HISTORY:   GOAL:  \"To get my fingers working better. Like to walk better. 2/17/17:  \"I feel like I am walking better except for on Monday and Tuesday. \"  Present Symptoms:    Falling 1 - 2 months - Kind of stoop over and put a tag on the car and got right back up. Sometimes I get dizzy when my sugar is low. Check it twice a day. Going down the sairs and I think I tripped. 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 Can drive 4 - 5 hours one way and then come back. History of Present Injury/Illness (Reason for Referral):    Past Medical History/Comorbidities:   Mr. Bubba Sauceda  has a past medical history of Abdominal distension (11/5/2013); Acute gout (11/5/2013); Anemia; Anxiety (5/1/2013); Anxiety and depression (5/1/2013); BPH (benign prostatic hyperplasia) (11/5/2013); Bronchitis (11/5/2013); CAD (coronary artery disease) (11/5/2013); CAD (coronary artery disease) (11/5/2013); Carcinoma, lung ([facility] (8/10/2015); Chronic kidney disease; CKD (chronic kidney disease) (11/5/2013);  Conjunctivitis (11/5/2013); DEMENTIA; Depression; Diabetes ([facility] Diabetes mellitus ([facility] (5/1/2013); DJD (degenerative joint disease) (11/5/2013); Fatty liver (11/7/2013); Fatty liver (11/7/2013); Gall stone (11/5/2013); GERD (gastroesophageal reflux disease) (11/5/2013); GERD (gastroesophageal reflux disease) (11/5/2013); Hepatomegaly (11/5/2013); Hepatomegaly (11/5/2013); Hepatomegaly (11/5/2013); History of GI tumor (11/5/2013); Hypercholesterolemia; Hypertension; Hypoglycemia (11/5/2013); Hypoglycemia (11/5/2013); Insomnia (11/5/2013); Noncompliance (11/5/2013); PAD (peripheral artery disease) ([facility] (11/5/2013); Persistent disorder of initiating or maintaining sleep (2/10/2015); Psychiatric disorder; and Urinary frequency (11/5/2013). He also has no past medical history of Heart failure ([facility] or Other ill-defined conditions(799.89). Mr. Rush Luis  has a past surgical history that includes colonoscopy; abdomen surgery proc unlisted; and vascular surgery procedure unlist (Right, 2/3/15). Social History/Living Environment:       Social History     Social History    Marital status:      Spouse name: N/A    Number of children: N/A    Years of education: N/A     Occupational History    Not on file. 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   Up to 2 days pers week 4 - 5 hours stints twice a day    Current Medications:    Current Outpatient Prescriptions:     atorvastatin (LIPITOR) 40 mg tablet, 40 mg., Disp: , Rfl:     amLODIPine (NORVASC) 5 mg tablet, , Disp: , Rfl:     BD INSULIN PEN NEEDLE UF ORIG 29 gauge x 1/2\" ndle, , Disp: , Rfl:     BD INSULIN SYRINGE ULTRA-FINE 1 mL 30 gauge x 1/2\" syrg, , Disp: , Rfl:     carvedilol (COREG) 12.5 mg tablet, , Disp: , Rfl:     clopidogrel (PLAVIX) 75 mg tablet, Take 1 Tab by mouth daily. , Disp: 90 Tab, Rfl: 3    furosemide (LASIX) 40 mg tablet, One daily, Disp: 90 Tab, Rfl: 3    potassium chloride (KLOR-CON M20) 20 mEq tablet, Take 1 Tab by mouth daily. , Disp: 90 Tab, Rfl: 4    buPROPion XL (WELLBUTRIN XL) 300 mg XL tablet, Take 1 Tab by mouth daily. , Disp: 90 Tab, Rfl: 3    Ranolazine 1,000 mg Tb12, Take 1,000 mg by mouth two (2) times a day., Disp: , Rfl:     pantoprazole (PROTONIX) 40 mg tablet, TAKE ONE (1) TABLET(S) ONCE DAILY, Disp: 90 Tab, Rfl: 3    insulin glargine (LANTUS) 100 unit/mL injection, by SubCUTAneous route nightly., Disp: , Rfl:     insulin lispro (HUMALOG) 100 unit/mL injection, by SubCUTAneous route., Disp: , Rfl:     cpap machine kit, by Does Not Apply route. autopap 5-20, Disp: , Rfl:     hydrALAZINE (APRESOLINE) 25 mg tablet, Take 25 mg by mouth three (3) times daily. , Disp: , Rfl:     PROAIR HFA 90 mcg/actuation inhaler, , Disp: , Rfl:     ONE TOUCH ULTRA TEST strip, , Disp: , Rfl:     SPIRIVA WITH HANDIHALER 18 mcg inhalation capsule, , Disp: , Rfl:     losartan (COZAAR) 100 mg tablet, Take 100 mg by mouth daily. , Disp: , Rfl:     NEEDLES, INSULIN DISPOSABLE (BD ULTRA-FINE JOSE PEN NEEDLES), by Does Not Apply route., Disp: , Rfl:     nitroglycerin (NITROSTAT) 0.4 mg SL tablet, by SubLINGual route every five (5) minutes as needed for Chest Pain., Disp: , Rfl:     isosorbide mononitrate ER (IMDUR) 30 mg tablet, Take 120 mg by mouth daily. , Disp: , Rfl:     tamsulosin (FLOMAX) 0.4 mg capsule, Take 0.4 mg by mouth daily. , Disp: , Rfl:     ASPIR-81 81 mg TbEC, take 81 mg by mouth daily.  , Disp: , Rfl:    Date Last Reviewed:  3/3/2017       Number of Personal Factors/Comorbidities that affect the Plan of Care: 3+: HIGH COMPLEXITY   EXAMINATION:   ROM:          WNL except some tightness in calves right greater than left  Strength:          Good except decreased power in left DF. DF - normal power today. TONGUE dyskinesia. Functional Mobility:         Gait/Ambulation:  Pt ambulates without assistive device and self reports only about 50 - 100' (will try a 6 mintue walk test). Pt shows bilateral heel scuff with small steps (decreased hip and core strength). Transfers:  independent        Bed Mobility:  NT  Mental Status:          Alert and oriented x 4. Occ decreased command following and motor processing. Impulsive. Vision:          Can see to drive without correction in the day. Getting glasses for his night vision. Body Structures Involved:  1. Nerves  2. Heart  3. Lungs  4. Bones  5. Joints  6. Muscles  7. Ligaments Body Functions Affected:  1. Mental  2. Sensory/Pain  3. Cardio  4. Respiratory  5. Neuromusculoskeletal  6. Movement Related Activities and Participation Affected:  1. Learning and Applying Knowledge  2. General Tasks and Demands  3. Mobility  4. Self Care  5. Domestic Life  6. Interpersonal Interactions and Relationships  7. Community, Social and [place]   Number of elements that affect the Plan of Care: 4+: HIGH COMPLEXITY   CLINICAL PRESENTATION:   Presentation: Evolving clinical presentation with unstable and unpredictable characteristics: HIGH COMPLEXITY   CLINICAL DECISION MAKING:   Outcome Measure: Tool Used: 6-Minute Walk Test   Score:  Initial: 495 Feet = TBD Meters  3 seated rest breaks  Most Recent: TBD Feet = TBD Meters    Interpretation of Score:    AGE  GENDER  MEAN  SD  NORMAL RANGE (2SD)    61-76  Male (15)   Female (22)  572   538  80   80  1-0   [phone]    66-77  Male (14)   Female (22)  527   471  80   69  46-65   321-621    80-80  Male (8)   Female (15)  443   648  44   88  991-134   222-562        Tool Used: Alexis Balance Scale  Score:  Initial: 41/56 43/56  2/17/17   Interpretation of Score: Each section is scored on a 0-4 scale, 0 representing the patients inability to perform the task and 4 representing independence. The scores of each section are added together for a total score of 56. The higher the patients score, the more independent the patient is. Any score below 45 indicates increased risk for falls. Score 56 55-45 44-34 33-23 22-12 11-1 0   Modifier CH CI CJ CK CL CM CN     ? Mobility - Walking and Moving Around:     - CURRENT STATUS: CJ - 20%-39% impaired, limited or restricted    - GOAL STATUS: CI - 1%-19% impaired, limited or restricted    - D/C STATUS:  ---------------To be determined---------------    Tool Used: Dynamic Gait Index  Score:  Initial: 16/24    Interpretation of Score: Each section is scored on a 0-3 scale, 0 representing the patients inability to perform the task and 3 representing independence. The scores of each section are added together for a total score of 24. Any score below 19 indicates increased risk for falls. Score 24 23-19 18-15 14-10 9-5 4-1 0   Modifier CH CI CJ CK CL CM CN     Tool Used: Timed Up and Go (TUG)  Score:  Initial: 12.65 seconds  13.85   2/17/17   Interpretation of Score: The test measures, in seconds, the time taken by an individual to stand up from a standard arm chair (seat height 46 cm [18 in], arm height 65 cm [25.6 in]), walk a distance of 3 meters (118 in, approx 10 ft), turn, walk back to the chair and sit down. If the individual takes longer than 14 seconds to complete TUG, this indicates risk for falls. Score 7 7.5-10.5 11-14 14.5-17.5 18-21 21.5-24.5 25+   Modifier  CI CJ CK CL CM CN     Medical Necessity:   · Skilled intervention continues to be required due to [address]. Reason for Services/Other Comments:  · Patient continues to require skilled intervention due to [id number] [address] DEFICIT.    Use of outcome tool(s) and clinical judgement create a POC that gives a: Difficult prediction of patient's progress: HIGH COMPLEXITY      S: \"Got a CT for my cancer and it clear. Feeling much better. Just sitting here no pain. Having less pain when walking - walked from my car to [email or web address] [place] [address] and can sit in the chair. Before I couldn't make it to the pharmacy before having to stop for pain. TREATMENT:   (In addition to Assessment/Re-Assessment sessions the following treatments were rendered)  Therapeutic Exercise: ( 45 minutes):  Exercises per above or grid below to assess and improve mobility, strength and balance. Required moderate visual, verbal and tactile cues to promote proper body alignment and promote proper body mechanics. Progressed complexity of movement as indicated. THERAPEUTIC ACTIVITY: (10 minutes): Therapeutic activities per grid below to improve mobility, strength and balance. Required minimal verbal and tactile cues to to perform correctly and safely. Diana Plasencia 2-24-17 Date:  3/3/17    Activity/Exercise      Nu Step Physiostep   Arms  Level 3  5 minutes - knee started to hurt but not calves Nu step  Arms :13  Seat: 11  Level: 1 (stay on this level but increase time 2* to cardiac precautions)  10 minutes no leg pain   Good breath support  O2 99% HR 85 bpm      Heel/toe raises- for sensory awareness prep X 20 x20    Standing lateral foot raises -  for sensory awareness prep X 20  x20    Hip circles      Seated ankle pumps -for sensory awareness prep and strengthening  X 20 B x20 B    Standing hip extension  Yellow   2 x 10 B  Yellow x 20. Cues to keep knee straight. Standing hip abduction Yellow   x20 B Yellow x 20. Cues to keep knee straight. Forward lunge step - targets for feet. Large step length x20 each leg. Left leg forward better technique. Right leg forward no lunge with right leg. One foot ahead of other  x30 second stand. 3 times each leg with seated rest after both sides. Having pt. count outloud for better breath support.   Cues for bigger stepwith right leg forward  Forward back steps with target dots down for larger step. No hand hold. x10 each. Seated rest between. Single leg stance 3 x 10 sec hold   Decreasing hand hold to tapping rail 3 x 10 seconds. Trying to get pt to count loud for better breath support. Counts too fast.  Tapping bars to steady. Standing forward/back bend X 10 with cues to SLOW down  And CGA     Calf stretch on incline board 5 x 20 sec      [place] walking 4 x 15'  Encouraging pt to take full steps and bring GIOVANNA over center vs. post 2 x10' stopping at each step. Holding wall but good big steps      Pre-Treatment Symptoms: Melissa Ascencio reports no pain prior to therapy. No increase in pain by end of session. Treatment/Session Assessment:  Melissa Ascencio feels much better now that he is off Doxapin. Still SOB but able to tolerate 10 minutes on the Nu step with low level resistance and walking farther without leg pain. Will keep resistance low and increase time 2* to cardiac blockage. Stronger. Working towards larger steps for better balance. · Post session pain:  No change in pain level  · Compliance with Program/Exercises: Will assess as treatment progresses. · Recommendations/Intent for next treatment session: \"Next visit will focus on progressive high level balance and LE sensorimotor awareness stretches and exercises. \".      PT Patient Time In/Time Out  Time In: 1005  Time Out: [address], [place]

## 2017-03-03 NOTE — PROGRESS NOTES
[facility]. : [facility] [address]  [phone]   [phone]         OUTPATIENT OCCUPATIONAL THERAPY: Daily Note 3/3/2017     ICD-10: Treatment Diagnosis: Other lack of coordination (R27.8)  Repeated falls (R29.6)  Precautions/Allergies:   Pcn [penicillins]   Fall Risk Score: 10 (? 5 = High Risk)  MD Orders: OT evaluate and treat MEDICAL/REFERRING DIAGNOSIS:   history of falls  DATE OF ONSET: 3 months ago   REFERRING PHYSICIAN: Ely Cornelius MD  RETURN PHYSICIAN APPOINTMENT: TBD     INITIAL ASSESSMENT:  MrYoli Burks presents to occupational therapy services with hx of decreased function of B UEs with c/o frequently dropping objects. Pt also has recent hx of frequent falls and is being seen by physical therapy as well. Pt does have some impaired gross and fine motor coordination in B UE, but especially in the L UE. Pt is pleasant and cooperative, but does appear to have some decreased attention with tasks and decreased command following with activities. Pt also has some decreased strength in the L hand compared to the R hand. Pt also has diabetes with hx of some sensory loss in B hands. Pt will benefit from OT services to address stated goals and plan of care. PLAN OF CARE:   PROBLEM LIST:  1. Decreased ADL/Functional Activities  2. Decreased Ambulation Ability/Technique  3. Decreased Balance  4. Decreased [place] with Home Exercise Program  5. Decreased Cognition  6. Impaired coordination INTERVENTIONS PLANNED:  1. Activities of daily living training  2. Adaptive equipment training  3. Balance training  4. Clothing management  5. Cognitive training  6. Neuromuscular re-eduation  7. Theraputic activity  8. Theraputic exercise  9.  Sensory Re-education    TREATMENT PLAN:  Effective Dates: 17 TO 17  Frequency/Duration: 2 times a week for 8 weeks  GOALS: (Goals have been discussed and agreed upon with patient.)  Short-Term Functional Goals: Time Frame:   1. Trellis Steward. will complete 15 minutes of coordination activities for B UE with minimal cues to improve coordination for functional activity. 2. Trellis Steward. will complete HEP with minimal cues to improve strength/coordination for ADL. [place]. with follow simple multiple step commands with activities with no cues to improve command following/attending to directions for daily activity. 4. Trellis Steward. will complete self-feeding tasks including management of drinks and opening packages with modified independence. Discharge Goals: Time Frame:   1. Trellis Steward. will complete 30 minutes of B UE coordination exercises/activities with supervision to improve functional use of hands for daily activity. 2. Trellis Steward. will be modified independent with HEP to improve strength/coordination of B UEs. 3. Trellis Steward. will demonstrate improved coordination in B hands as evidenced by ability to complete 9-hole pegboard in 30 seconds or less to show improved hand function for daily activity. [place]. will report 75% decrease in dropping objects with daily activity to improve hand function/strength for daily activity. Rehabilitation Potential For Stated Goals: Good  Regarding Charito Walker Jr.'s therapy, I certify that the treatment plan above will be carried out by a therapist or under their direction. Thank you for this referral,  Ivonne Blanton OT                 The information in this section was collected on 2/17/17 (except where otherwise noted). OCCUPATIONAL PROFILE & HISTORY:   History of Present Injury/Illness (Reason for Referral):  Patient reports frequently dropping small objects such as car keys, dropping utensils when he is eating. States that this all began 3 months ago. Pt is currently seeing PT for falls (last fall 3 months ago).  Pt reports his hands don't feel stiff, numb, or weak. Pt is diabetic and is taking two doses of insulin per day. No hx of injury to either UE per patient report. Past Medical History/Comorbidities:   Mr. Kristin Jones  has a past medical history of Abdominal distension (11/5/2013); Acute gout (11/5/2013); Anemia; Anxiety (5/1/2013); Anxiety and depression (5/1/2013); BPH (benign prostatic hyperplasia) (11/5/2013); Bronchitis (11/5/2013); CAD (coronary artery disease) (11/5/2013); CAD (coronary artery disease) (11/5/2013); Carcinoma, lung ([facility] [address] (8/10/2015); Chronic kidney disease; CKD (chronic kidney disease) (11/5/2013); Conjunctivitis (11/5/2013); DEMENTIA; Depression; Diabetes ([address] Diabetes mellitus ([facility] [address] (5/1/2013); DJD (degenerative joint disease) (11/5/2013); Fatty liver (11/7/2013); Fatty liver (11/7/2013); Gall stone (11/5/2013); GERD (gastroesophageal reflux disease) (11/5/2013); GERD (gastroesophageal reflux disease) (11/5/2013); Hepatomegaly (11/5/2013); Hepatomegaly (11/5/2013); Hepatomegaly (11/5/2013); History of GI tumor (11/5/2013); Hypercholesterolemia; Hypertension; Hypoglycemia (11/5/2013); Hypoglycemia (11/5/2013); Insomnia (11/5/2013); Noncompliance (11/5/2013); PAD (peripheral artery disease) ([address] (11/5/2013); Persistent disorder of initiating or maintaining sleep (2/10/2015); Psychiatric disorder; and Urinary frequency (11/5/2013). He also has no past medical history of Heart failure ([address] or Other ill-defined conditions(799.89). Mr. Kristin Jones  has a past surgical history that includes colonoscopy; abdomen surgery proc unlisted; and vascular surgery procedure unlist (Right, 2/3/15). Social History/Living Environment:     Pt lives with wife. Pt has three adult children that live in [place], [place]. No assistive devices. Independent with ADL. Pt has a walker, walkin shower with grab with shower chair.  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 Pt driving all over [place].  Prior Level of Function/Work/Activity:  Independent and working 3 days per week  Personal Factors:          Overall Behavior:  Cooperative, pleasant, decreased command following with activity   Current Medications:    Current Outpatient Prescriptions:     atorvastatin (LIPITOR) 40 mg tablet, 40 mg., Disp: , Rfl:     amLODIPine (NORVASC) 5 mg tablet, , Disp: , Rfl:     BD INSULIN PEN NEEDLE UF ORIG 29 gauge x 1/2\" ndle, , Disp: , Rfl:     BD INSULIN SYRINGE ULTRA-FINE 1 mL 30 gauge x 1/2\" syrg, , Disp: , Rfl:     carvedilol (COREG) 12.5 mg tablet, , Disp: , Rfl:     clopidogrel (PLAVIX) 75 mg tablet, Take 1 Tab by mouth daily. , Disp: 90 Tab, Rfl: 3    furosemide (LASIX) 40 mg tablet, One daily, Disp: 90 Tab, Rfl: 3    potassium chloride (KLOR-CON M20) 20 mEq tablet, Take 1 Tab by mouth daily. , Disp: 90 Tab, Rfl: 4    buPROPion XL (WELLBUTRIN XL) 300 mg XL tablet, Take 1 Tab by mouth daily. , Disp: 90 Tab, Rfl: 3    Ranolazine 1,000 mg Tb12, Take 1,000 mg by mouth two (2) times a day., Disp: , Rfl:     pantoprazole (PROTONIX) 40 mg tablet, TAKE ONE (1) TABLET(S) ONCE DAILY, Disp: 90 Tab, Rfl: 3    insulin glargine (LANTUS) 100 unit/mL injection, by SubCUTAneous route nightly., Disp: , Rfl:     insulin lispro (HUMALOG) 100 unit/mL injection, by SubCUTAneous route., Disp: , Rfl:     cpap machine kit, by Does Not Apply route. autopap 5-20, Disp: , Rfl:     hydrALAZINE (APRESOLINE) 25 mg tablet, Take 25 mg by mouth three (3) times daily. , Disp: , Rfl:     PROAIR HFA 90 mcg/actuation inhaler, , Disp: , Rfl:     ONE TOUCH ULTRA TEST strip, , Disp: , Rfl:     SPIRIVA WITH HANDIHALER 18 mcg inhalation capsule, , Disp: , Rfl:     losartan (COZAAR) 100 mg tablet, Take 100 mg by mouth daily. , Disp: , Rfl:     NEEDLES, INSULIN DISPOSABLE (BD ULTRA-FINE JOSE PEN NEEDLES), by Does Not Apply route., Disp: , Rfl:     nitroglycerin (NITROSTAT) 0.4 mg SL tablet, by SubLINGual route every five (5) minutes as needed for Chest Pain., Disp: , Rfl:     isosorbide mononitrate ER (IMDUR) 30 mg tablet, Take 120 mg by mouth daily. , Disp: , Rfl:     tamsulosin (FLOMAX) 0.4 mg capsule, Take 0.4 mg by mouth daily. , Disp: , Rfl:     ASPIR-81 81 mg TbEC, take 81 mg by mouth daily. , Disp: , Rfl:             Date Last Reviewed:  2/17/17   Complexity Level : Expanded review of therapy/medical records (1-2):  MODERATE COMPLEXITY   ASSESSMENT OF OCCUPATIONAL PERFORMANCE:     ROM/Strength:     Date:  2/17/17 Date:  2/17/17  Date:  2/17/17 Date:  2/17/17    AROM AROM  STRENGTH STRENGTH   Movement RIGHT LEFT  RIGHT LEFT   SHOULDER:        Flexion  WNL WNL  5 5   Abduction  WNL WNL  5 5   ELBOW:        Flexion  WNL WNL  5 5   Extension  WNL WNL  5 5   WRIST:        Wrist extension  WNL WNL  5 5   HAND:        Hand Strength:            82 lbs 67 lbs    THREE JAW SHANNA PINCH    unable unable    LATERAL PINCH     14 lbs  11 lbs      Functional Mobility:         Gait/Ambulation:  No assistive device        Transfers:  Supervision to modified independence   Coordination: Decreased coordination with rapid alternating movements         Coordination: Tool Used: Five Time Finger to Nose  Score:  Initial: R: 5 (s) L: 5 (s) Most Recent: TBD   Interpretation of Score: Dysmetria to the L of nose with L UE        Tool Used: 9-hole pegboard    Score:  Initial: R: 31 seconds (dropped pegs x 2)   L: 68 seconds and assists with R hand 75% of the time Most Recent: TBD   Interpretation of Score:Impaired coordination bilaterally (L>R)     Tool Used:  [employer]  Test  Score:  Initial: R: 28 (s) L: 41 (s) Most Recent: TBD   Interpretation of Score: Impaired bilaterally (L>R)       Sensation: Reports occasional numbness in L thumb         [place] [employer]:  2.83: Only able to identify with L thumb  3.61: Able to identify 100% bilaterally    Balance:          Frequent falls; See PT chart  Activities of Daily Living:           Basic ADLs (From Assessment) Complex ADLs (From Assessment)         Grooming/Bathing/Dressing Activities of Daily Living                                        Physical Skills Involved:  1. Balance  2. Mobility  3. Fine or Gross Motor Coordination  4. Sensation  5. Dexterity Cognitive Skills Affected (resulting in the inability to perform in a timely and safe manner):  1. Attending  2. Understanding  3. Problem Solving  4. Mental Sequencing Psychosocial Skills Affected:  1. Routines and Behaviors   Number of elements that affect the Plan of Care: 5+:  HIGH COMPLEXITY   CLINICAL DECISION MAKING:   Outcome Measure: Tool Used: [address] [facility] [address] [address] AM-[facility] Daily Activity Outpatient Short Form  Score:  Initial: 40 Most Recent: X (Date: -- )   Interpretation of Tool:  Represents clinically-significant functional categories (i.e., basic and instrumental activities of daily living, fine motor activities). Score 60 59-55 54-47 46-34 32-21 20-16 15   Modifier CH CI CJ CK CL CM CN     ? Carrying, Moving, and Handling Objects:     - CURRENT STATUS: CK - 40%-59% impaired, limited or restricted    - GOAL STATUS: CJ - 20%-39% impaired, limited or restricted    - D/C STATUS:  ---------------To be determined---------------    Medical Necessity:   · Patient demonstrates excellent rehab potential due to higher previous functional level. Reason for Services/Other Comments:  · Patient continues to require skilled intervention due to decreased functional use of B hands with daily activity.   Clinical Decision-Making Assessment:     Use of outcome tool(s) and clinical judgement create a POC that gives a: Questionable prediction of patient's progress: MODERATE COMPLEXITY   TREATMENT:   (In addition to Assessment/Re-Assessment sessions the following treatments were rendered)    Pre-treatment Symptoms/Complaints:  No complaints  Pain: Initial:     0/10 Post Session:  0/10 Therapeutic Activity: (10 minutes):    Date:  3/3/17 Date:   Date:     Activity/Exercise Parameters Parameters Parameters   Sensory Bin Pt completed reaching in sensory bin to find objects with vision occluded. Pt completed with R hand retrieving 7/10 objects and   L hand 8/10 objects                                            Therapeutic Exercise: ( 15):  Exercises per grid below to improve mobility, strength and coordination. Required minimal visual, verbal, manual and tactile cues to promote proper body alignment and promote proper body mechanics. Progressed resistance, repetitions and complexity of movement as indicated. Date:  3/3/17 Date:   Date:     Activity/Exercise Parameters Parameters Parameters   Putty Press B hands x for 2 minutes pressing in putty with cone;   1 minute each hand with pencil holding in tripod grasp     Finger Abduction  15 reps with B hands with moderate assistance to help with motor control of digits      Resistance Clips 25 reps black clip with R hand with three jaw hardy (additional time and minimal tactile cues)  25 reps with blue clip with L hand                                 Neuromuscular Re-education: (20):  Exercise/activities per grid below to improve coordination, kinesthetic sense and proprioception. Required minimal to moderate visual, verbal, manual and tactile cues to promote motor control of bilateral, upper extremity(s).             Date:  3/3/17 Date:   Date:     Activity/Exercise Parameters Parameters Parameters   Stacking blocks  Pt required moderate cueing throughout with visual/verbal cues; pt drops blocks 75% of the time and has difficulty steadying hand to stack blocks     Turning Cards Additional time with B hands (30 reps each)     Finger Taps  Individiual with minimal to moderate tactile cues x 10 reps each digit      Door Knobs 10 reps with 1 lb weighted balls x 2 sets (first set slow and 2nd set increasing speed     Light Bulbs 10 reps B UE holding 1 lb ball     Punches  2 sets x 10 reps with 1 lb ball with moderate cues to improve speed in L UE     Scarf toss  10 reps additional time with pt dropping scarf 75% of the time       Treatment/Session Assessment:    · Response to Treatment:  Pt tolerated treatment well. Pt does continue to need additional time and cues to perform tasks appropriately. Pt has poor awareness of when he is making mistakes and seems to lack some awareness of his deficits. Pt very pleasant and motivated throughout session. Pt lacks gross and fine motor speed and motor control, especially in L UE.   · Compliance with Program/Exercises: Will assess as treatment progresses. · Recommendations/Intent for next treatment session: \"Next visit will focus on advancements to more challenging activities and reduction in assistance provided\".   Total Treatment Duration:  OT Patient Time In/Time Out  Time In: 1100  Time Out: [id number] [facility],

## 2017-03-06 ENCOUNTER — HOSPITAL ENCOUNTER (OUTPATIENT)
Dept: PHYSICAL THERAPY | Age: 77
Discharge: HOME | End: 2017-03-06
Attending: INTERNAL MEDICINE
Payer: COMMERCIAL

## 2017-03-06 PROCEDURE — 97530 THERAPEUTIC ACTIVITIES: CPT

## 2017-03-06 PROCEDURE — 97110 THERAPEUTIC EXERCISES: CPT

## 2017-03-06 PROCEDURE — 97112 NEUROMUSCULAR REEDUCATION: CPT

## 2017-03-06 NOTE — PROGRESS NOTES
Romana Snipe. : [facility] [address]  [phone]   [phone]         OUTPATIENT OCCUPATIONAL THERAPY: Daily Note 3/6/2017     ICD-10: Treatment Diagnosis: Other lack of coordination (R27.8)  Repeated falls (R29.6)  Precautions/Allergies:   Pcn [penicillins]   Fall Risk Score: 10 (? 5 = High Risk)  MD Orders: OT evaluate and treat MEDICAL/REFERRING DIAGNOSIS:   history of falls  DATE OF ONSET: 3 months ago   REFERRING PHYSICIAN: Kevin Castaneda MD  RETURN PHYSICIAN APPOINTMENT: TBD     INITIAL ASSESSMENT:  Mr. Gaby Montano presents to occupational therapy services with hx of decreased function of B UEs with c/o frequently dropping objects. Pt also has recent hx of frequent falls and is being seen by physical therapy as well. Pt does have some impaired gross and fine motor coordination in B UE, but especially in the L UE. Pt is pleasant and cooperative, but does appear to have some decreased attention with tasks and decreased command following with activities. Pt also has some decreased strength in the L hand compared to the R hand. Pt also has diabetes with hx of some sensory loss in B hands. Pt will benefit from OT services to address stated goals and plan of care. PLAN OF CARE:   PROBLEM LIST:  1. Decreased ADL/Functional Activities  2. Decreased Ambulation Ability/Technique  3. Decreased Balance  4. Decreased [place] with Home Exercise Program  5. Decreased Cognition  6. Impaired coordination INTERVENTIONS PLANNED:  1. Activities of daily living training  2. Adaptive equipment training  3. Balance training  4. Clothing management  5. Cognitive training  6. Neuromuscular re-eduation  7. Theraputic activity  8. Theraputic exercise  9.  Sensory Re-education    TREATMENT PLAN:  Effective Dates: 17 TO 17  Frequency/Duration: 2 times a week for 8 weeks  GOALS: (Goals have been discussed and agreed upon with patient.)  Short-Term Functional Goals: Time Frame:   1. Bernadette Jungling. will complete 15 minutes of coordination activities for B UE with minimal cues to improve coordination for functional activity. 2. Bernadette Jungling. will complete HEP with minimal cues to improve strength/coordination for ADL. [place]. with follow simple multiple step commands with activities with no cues to improve command following/attending to directions for daily activity. 4. Bernadette Jungling. will complete self-feeding tasks including management of drinks and opening packages with modified independence. Discharge Goals: Time Frame:   1. Bernadette Jungling. will complete 30 minutes of B UE coordination exercises/activities with supervision to improve functional use of hands for daily activity. 2. Bernadette Jungling. will be modified independent with HEP to improve strength/coordination of B UEs. 3. Bernadette Jungling. will demonstrate improved coordination in B hands as evidenced by ability to complete 9-hole pegboard in 30 seconds or less to show improved hand function for daily activity. Brad. will report 75% decrease in dropping objects with daily activity to improve hand function/strength for daily activity. Rehabilitation Potential For Stated Goals: Good  Regarding Juana Geronimo Jr.'s therapy, I certify that the treatment plan above will be carried out by a therapist or under their direction. Thank you for this referral,  Emeterio Lei OT                 The information in this section was collected on 2/17/17 (except where otherwise noted). OCCUPATIONAL PROFILE & HISTORY:   History of Present Injury/Illness (Reason for Referral):  Patient reports frequently dropping small objects such as car keys, dropping utensils when he is eating. States that this all began 3 months ago. Pt is currently seeing PT for falls (last fall 3 months ago).  Pt reports his hands don't feel stiff, numb, or weak. Pt is diabetic and is taking two doses of insulin per day. No hx of injury to either UE per patient report. Past Medical History/Comorbidities:   Mr. Rush Luis  has a past medical history of Abdominal distension (11/5/2013); Acute gout (11/5/2013); Anemia; Anxiety (5/1/2013); Anxiety and depression (5/1/2013); BPH (benign prostatic hyperplasia) (11/5/2013); Bronchitis (11/5/2013); CAD (coronary artery disease) (11/5/2013); CAD (coronary artery disease) (11/5/2013); Carcinoma, lung ([facility] [address] (8/10/2015); Chronic kidney disease; CKD (chronic kidney disease) (11/5/2013); Conjunctivitis (11/5/2013); DEMENTIA; Depression; Diabetes ([facility] [address] Diabetes mellitus ([facility] [address] (5/1/2013); DJD (degenerative joint disease) (11/5/2013); Fatty liver (11/7/2013); Fatty liver (11/7/2013); Gall stone (11/5/2013); GERD (gastroesophageal reflux disease) (11/5/2013); GERD (gastroesophageal reflux disease) (11/5/2013); Hepatomegaly (11/5/2013); Hepatomegaly (11/5/2013); Hepatomegaly (11/5/2013); History of GI tumor (11/5/2013); Hypercholesterolemia; Hypertension; Hypoglycemia (11/5/2013); Hypoglycemia (11/5/2013); Insomnia (11/5/2013); Noncompliance (11/5/2013); PAD (peripheral artery disease) ([facility] [address] (11/5/2013); Persistent disorder of initiating or maintaining sleep (2/10/2015); Psychiatric disorder; and Urinary frequency (11/5/2013). He also has no past medical history of Heart failure ([address] or Other ill-defined conditions(799.89). Mr. Rush Luis  has a past surgical history that includes colonoscopy; abdomen surgery proc unlisted; and vascular surgery procedure unlist (Right, 2/3/15). Social History/Living Environment:     Pt lives with wife. Pt has three adult children that live in [place], [place]. No assistive devices. Independent with ADL. Pt has a walker, walkin shower with grab with shower chair.  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 Pt driving all over [place].  Prior Level of Function/Work/Activity:  Independent and working 3 days per week  Personal Factors:          Overall Behavior:  Cooperative, pleasant, decreased command following with activity   Current Medications:    Current Outpatient Prescriptions:     atorvastatin (LIPITOR) 40 mg tablet, 40 mg., Disp: , Rfl:     amLODIPine (NORVASC) 5 mg tablet, , Disp: , Rfl:     BD INSULIN PEN NEEDLE UF ORIG 29 gauge x 1/2\" ndle, , Disp: , Rfl:     BD INSULIN SYRINGE ULTRA-FINE 1 mL 30 gauge x 1/2\" syrg, , Disp: , Rfl:     carvedilol (COREG) 12.5 mg tablet, , Disp: , Rfl:     clopidogrel (PLAVIX) 75 mg tablet, Take 1 Tab by mouth daily. , Disp: 90 Tab, Rfl: 3    furosemide (LASIX) 40 mg tablet, One daily, Disp: 90 Tab, Rfl: 3    potassium chloride (KLOR-CON M20) 20 mEq tablet, Take 1 Tab by mouth daily. , Disp: 90 Tab, Rfl: 4    buPROPion XL (WELLBUTRIN XL) 300 mg XL tablet, Take 1 Tab by mouth daily. , Disp: 90 Tab, Rfl: 3    Ranolazine 1,000 mg Tb12, Take 1,000 mg by mouth two (2) times a day., Disp: , Rfl:     pantoprazole (PROTONIX) 40 mg tablet, TAKE ONE (1) TABLET(S) ONCE DAILY, Disp: 90 Tab, Rfl: 3    insulin glargine (LANTUS) 100 unit/mL injection, by SubCUTAneous route nightly., Disp: , Rfl:     insulin lispro (HUMALOG) 100 unit/mL injection, by SubCUTAneous route., Disp: , Rfl:     cpap machine kit, by Does Not Apply route. autopap 5-20, Disp: , Rfl:     hydrALAZINE (APRESOLINE) 25 mg tablet, Take 25 mg by mouth three (3) times daily. , Disp: , Rfl:     PROAIR HFA 90 mcg/actuation inhaler, , Disp: , Rfl:     ONE TOUCH ULTRA TEST strip, , Disp: , Rfl:     SPIRIVA WITH HANDIHALER 18 mcg inhalation capsule, , Disp: , Rfl:     losartan (COZAAR) 100 mg tablet, Take 100 mg by mouth daily. , Disp: , Rfl:     NEEDLES, INSULIN DISPOSABLE (BD ULTRA-FINE JOSE PEN NEEDLES), by Does Not Apply route., Disp: , Rfl:     nitroglycerin (NITROSTAT) 0.4 mg SL tablet, by SubLINGual route every five (5) minutes as needed for Chest Pain., Disp: , Rfl:     isosorbide mononitrate ER (IMDUR) 30 mg tablet, Take 120 mg by mouth daily. , Disp: , Rfl:     tamsulosin (FLOMAX) 0.4 mg capsule, Take 0.4 mg by mouth daily. , Disp: , Rfl:     ASPIR-81 81 mg TbEC, take 81 mg by mouth daily. , Disp: , Rfl:             Date Last Reviewed:  2/17/17   Complexity Level : Expanded review of therapy/medical records (1-2):  MODERATE COMPLEXITY   ASSESSMENT OF OCCUPATIONAL PERFORMANCE:     ROM/Strength:     Date:  2/17/17 Date:  2/17/17  Date:  2/17/17 Date:  2/17/17    AROM AROM  STRENGTH STRENGTH   Movement RIGHT LEFT  RIGHT LEFT   SHOULDER:        Flexion  WNL WNL  5 5   Abduction  WNL WNL  5 5   ELBOW:        Flexion  WNL WNL  5 5   Extension  WNL WNL  5 5   WRIST:        Wrist extension  WNL WNL  5 5   HAND:        Hand Strength:            82 lbs 67 lbs    THREE JAW SHANNA PINCH    unable unable    LATERAL PINCH     14 lbs  11 lbs      Functional Mobility:         Gait/Ambulation:  No assistive device        Transfers:  Supervision to modified independence   Coordination: Decreased coordination with rapid alternating movements         Coordination: Tool Used: Five Time Finger to Nose  Score:  Initial: R: 5 (s) L: 5 (s) Most Recent: TBD   Interpretation of Score: Dysmetria to the L of nose with L UE        Tool Used: 9-hole pegboard    Score:  Initial: R: 31 seconds (dropped pegs x 2)   L: 68 seconds and assists with R hand 75% of the time Most Recent: TBD   Interpretation of Score:Impaired coordination bilaterally (L>R)     Tool Used:  [employer]  Test  Score:  Initial: R: 28 (s) L: 41 (s) Most Recent: TBD   Interpretation of Score: Impaired bilaterally (L>R)       Sensation: Reports occasional numbness in L thumb         [employer] Monofilament:  2.83: Only able to identify with L thumb  3.61: Able to identify 100% bilaterally    Balance:          Frequent falls; See PT chart  Activities of Daily Living:           Basic ADLs (From Assessment) Complex ADLs (From Assessment)         Grooming/Bathing/Dressing Activities of Daily Living                                        Physical Skills Involved:  1. Balance  2. Mobility  3. Fine or Gross Motor Coordination  4. Sensation  5. Dexterity Cognitive Skills Affected (resulting in the inability to perform in a timely and safe manner):  1. Attending  2. Understanding  3. Problem Solving  4. Mental Sequencing Psychosocial Skills Affected:  1. Routines and Behaviors   Number of elements that affect the Plan of Care: 5+:  HIGH COMPLEXITY   CLINICAL DECISION MAKING:   Outcome Measure: Tool Used: [address] [facility] [address] [address] [facility] Daily Activity Outpatient Short Form  Score:  Initial: 40 Most Recent: X (Date: -- )   Interpretation of Tool:  Represents clinically-significant functional categories (i.e., basic and instrumental activities of daily living, fine motor activities). Score 60 59-55 54-47 46-34 32-21 20-16 15   Modifier CH CI CJ CK CL CM CN     ? Carrying, Moving, and Handling Objects:     - CURRENT STATUS: CK - 40%-59% impaired, limited or restricted    - GOAL STATUS: CJ - 20%-39% impaired, limited or restricted    - D/C STATUS:  ---------------To be determined---------------    Medical Necessity:   · Patient demonstrates excellent rehab potential due to higher previous functional level. Reason for Services/Other Comments:  · Patient continues to require skilled intervention due to decreased functional use of B hands with daily activity.   Clinical Decision-Making Assessment:     Use of outcome tool(s) and clinical judgement create a POC that gives a: Questionable prediction of patient's progress: MODERATE COMPLEXITY   TREATMENT:   (In addition to Assessment/Re-Assessment sessions the following treatments were rendered)    Pre-treatment Symptoms/Complaints:  No complaints  Pain: Initial:   Pain Intensity 1: 0 0/10 Post Session:  0/10     Therapeutic Activity: (0 minutes):    Date:  3/3/17 Date:   Date:     Activity/Exercise Parameters Parameters Parameters   Sensory Bin Pt completed reaching in sensory bin to find objects with vision occluded. Pt completed with R hand retrieving 7/10 objects and   L hand 8/10 objects                                            Therapeutic Exercise: ( 20):  Exercises per grid below to improve mobility, strength and coordination. Required minimal visual, verbal, manual and tactile cues to promote proper body alignment and promote proper body mechanics. Progressed resistance, repetitions and complexity of movement as indicated. Date:  3/3/17 Date:  3/6/17 Date:     Activity/Exercise Parameters Parameters Parameters   Putty Press B hands x for 2 minutes pressing in putty with cone;   1 minute each hand with pencil holding in tripod grasp     Finger Abduction  15 reps with B hands with moderate assistance to help with motor control of digits      Resistance Clips 25 reps black clip with R hand with three jaw hardy (additional time and minimal tactile cues)  25 reps with blue clip with L hand Rotating in the hand x 10 reps and placing to target    UBE  5.5 minutes at 2.0 resistance     Wall Push-ups   15 reps      PNF Strengthening   D2 flexion/extension; 10 reps with 1lb ball with fatigue               Neuromuscular Re-education: (25):  Exercise/activities per grid below to improve coordination, kinesthetic sense and proprioception. Required minimal to moderate visual, verbal, manual and tactile cues to promote motor control of bilateral, upper extremity(s).             Date:  3/3/17 Date:  3/6/17 Date:     Activity/Exercise Parameters Parameters Parameters   Stacking blocks  Pt required moderate cueing throughout with visual/verbal cues; pt drops blocks 75% of the time and has difficulty steadying hand to stack blocks     Turning Cards Additional time with B hands (30 reps each)     Finger Taps Individiual with minimal to moderate tactile cues x 10 reps each digit      Door Knobs 10 reps with 1 lb weighted balls x 2 sets (first set slow and 2nd set increasing speed 10 reps slowed; 10 reps fast     Light Bulbs 10 reps B UE holding 1 lb ball     Punches  2 sets x 10 reps with 1 lb ball with moderate cues to improve speed in L UE 2 sets x 10 reps alternating; 1 set x 10 reps each UE     Scarf toss  10 reps additional time with pt dropping scarf 75% of the time 10 reps to each hand with pt dropping 60% of the time    PNF  10 reps with 1 lb weights     Beans   15 beans per hand with additional time; pt drops ~30% of the time      Treatment/Session Assessment:    · Response to Treatment:  Pt appears more fatigued with activity today, needing additional rest breaks in between. Pt need minimal to moderate verbal/visual cues to complete tasks accurately and to follow directions. Pt had poor endurance with UE strengthening exercises. Pt has hx of heard disease as well as lung cancer, therefore is fairly deconditioned. Pt to continue per plan of care. · Compliance with Program/Exercises: Will assess as treatment progresses. · Recommendations/Intent for next treatment session: \"Next visit will focus on advancements to more challenging activities and reduction in assistance provided\".   Total Treatment Duration:  OT Patient Time In/Time Out  Time In: 1345  Time Out: Yao 48, OT

## 2017-03-06 NOTE — PROGRESS NOTES
Jennifer mai. : [facility] [address]  [phone]   [phone]          OUTPATIENT PHYSICAL THERAPY:Progress Report 3/6/2017    ICD-10: Treatment Diagnosis: Repeated falls (R29.6)  Unsteadiness on feet (R26.81)  Unspecified lack of coordination (R27.9)  Precautions/Allergies:   Pcn [penicillins] Nitroglycerin with pt at all times - CP maybe every 2 weeks with strenuous walking or activity. Fall Risk Score: 10 (? 5 = High Risk)  MD Orders: Outpatient PT referral MEDICAL/REFERRING DIAGNOSIS:  history of falls   DATE OF ONSET: Few months  REFERRING PHYSICIAN: Humberto Malin MD  RETURN PHYSICIAN APPOINTMENT: unknown  DATE OF PROGRESS NOTE:    RECERTIFICATION DATE:    PROGRESS:  Pt has attended 9 physical therapy sessions from 17 to date including initial assessment. Sessions consist of range of motion, therapeutic activity, therapeutic exercise, balance and gait refinement. Pt has shown improvement in static standing balance. Pt has increased Alexis Balance Score by 2 points indicating improved static standing balance and decreased risk for fall. Pt has great difficulty following commands for exercise and testing. Pt states he is a little hard of hearing yet he never asks me to repeat myself. Pt did worse on his TUG but I am not sure he was understanding the commands and it was administered twice. Dynamic Gait Index was about the same. Pt has been compliant with his exercises and would benefit from at least one more month to try to maximize functional and balance ability. INITIAL ASSESSMENT:  Mr. Mireya Charles presents with increased instance of falling, maybe 2 per month. Pt reports a fall when he was leaning over and one going down steps without lights on which is more of an accident vs balance loss but does speak to decreased sensory awareness with vision removed.   Pt has multiple medical problems contributing to his deficits such as his diabetes, PAD with pain in legs walking 50 - 100', CAD with ~biweekly chest pain needing Nitroglycerin. 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 Pt show decreased command following and motor processing. Pt's biggest complaint to me are his hands - he states he can be holding something and just drop it without knowing. Pt will benefit from OT consult for this. Pt presents with gait and balance deficit and functional hip weakness. Pt will benefit from PT to maximize ability and safety with mobility    PROBLEM LIST (Impacting functional limitations):  1. Decreased Strength  2. Decreased ADL/Functional Activities  3. Decreased Transfer Abilities  4. Decreased Ambulation Ability/Technique  5. Decreased Balance  6. Increased Pain  7. Decreased Activity Tolerance  8. Increased Fatigue  9. Increased Shortness of Breath  10. Decreased Flexibility/Joint Mobility  11. Decreased Knowledge of Precautions  12. Decreased [place] with Home Exercise Program  13. Decreased Cognition INTERVENTIONS PLANNED:  1. Balance Exercise  2. Gait Training  3. Home Exercise Program (HEP)  4. Neuromuscular Re-education/Strengthening  5. Range of Motion (ROM)  6. Therapeutic Activites  7. Therapeutic Exercise/Strengthening  8. Transfer Training  9. possible orthotic consult   TREATMENT PLAN:  Effective Dates: 1/19/17 TO 4/14/17. Frequency/Duration: 2 times a week as able for this patient. (He rarely knows his work schedule more than a couple of day in advance and can be needed to drive nearly any day of the week) for 12 weeks  GOALS: (Goals have been discussed and agreed upon with patient.)  Short-Term Functional Goals: Time Frame: 4 weeks  1.  Pt will be independent with range of motion, strength and balance home exercise program.  STATUS:  MET  Discharge Goals: Time Frame: 8 weeks  Pt will show increased range of motion and improved posture to allow for improved safety and ability with all functional mobility. STATUS:  MET  Pt will decrease TUG score indicating more normalized gait pattern and decrease risk for falls. STATUS:  NOT MET, CONT 4 WEEKS. Pt will increase Alexis Balance Scale to 48/56 indicating increased balance and decreased risk for falls. STATUS:  NOT MET, CONT 4 WEEKS. Pt will increase Dynamic Gait Index to 19/24 indicating increase gait balance and decreased risk for falls. STATUS:  NOT MET, CONT 4 WEEKS. Pt will be able to ambulate increased community distances with least restrictive assistive device independent and safe as evidenced by increased distance on the 6 minute walk test.  STATUS:  NOT TESTED, CONT 4 WEEKS. Rehabilitation Potential For Stated Goals: Fair              HISTORY:   GOAL:  \"To get my fingers working better. Like to walk better. 2/17/17:  \"I feel like I am walking better except for on Monday and Tuesday. \"  Present Symptoms:    Falling 1 - 2 months - Kind of stoop over and put a tag on the car and got right back up. Sometimes I get dizzy when my sugar is low. Check it twice a day. Going down the sairs and I think I tripped. 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 Can drive 4 - 5 hours one way and then come back. History of Present Injury/Illness (Reason for Referral):    Past Medical History/Comorbidities:   Mr. Kristin Jones  has a past medical history of Abdominal distension (11/5/2013); Acute gout (11/5/2013); Anemia; Anxiety (5/1/2013); Anxiety and depression (5/1/2013); BPH (benign prostatic hyperplasia) (11/5/2013); Bronchitis (11/5/2013); CAD (coronary artery disease) (11/5/2013); CAD (coronary artery disease) (11/5/2013); Carcinoma, lung ([facility] (8/10/2015); Chronic kidney disease; CKD (chronic kidney disease) (11/5/2013);  Conjunctivitis (11/5/2013); DEMENTIA; Depression; Diabetes ([facility] Diabetes mellitus ([facility] (5/1/2013); DJD (degenerative joint disease) (11/5/2013); Fatty liver (11/7/2013); Fatty liver (11/7/2013); Gall stone (11/5/2013); GERD (gastroesophageal reflux disease) (11/5/2013); GERD (gastroesophageal reflux disease) (11/5/2013); Hepatomegaly (11/5/2013); Hepatomegaly (11/5/2013); Hepatomegaly (11/5/2013); History of GI tumor (11/5/2013); Hypercholesterolemia; Hypertension; Hypoglycemia (11/5/2013); Hypoglycemia (11/5/2013); Insomnia (11/5/2013); Noncompliance (11/5/2013); PAD (peripheral artery disease) ([facility] (11/5/2013); Persistent disorder of initiating or maintaining sleep (2/10/2015); Psychiatric disorder; and Urinary frequency (11/5/2013). He also has no past medical history of Heart failure ([facility] or Other ill-defined conditions(799.89). Mr. Roya Berry  has a past surgical history that includes colonoscopy; abdomen surgery proc unlisted; and vascular surgery procedure unlist (Right, 2/3/15). Social History/Living Environment:       Social History     Social History    Marital status:      Spouse name: N/A    Number of children: N/A    Years of education: N/A     Occupational History    Not on file. 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   Up to 2 days pers week 4 - 5 hours stints twice a day    Current Medications:    Current Outpatient Prescriptions:     atorvastatin (LIPITOR) 40 mg tablet, 40 mg., Disp: , Rfl:     amLODIPine (NORVASC) 5 mg tablet, , Disp: , Rfl:     BD INSULIN PEN NEEDLE UF ORIG 29 gauge x 1/2\" ndle, , Disp: , Rfl:     BD INSULIN SYRINGE ULTRA-FINE 1 mL 30 gauge x 1/2\" syrg, , Disp: , Rfl:     carvedilol (COREG) 12.5 mg tablet, , Disp: , Rfl:     clopidogrel (PLAVIX) 75 mg tablet, Take 1 Tab by mouth daily. , Disp: 90 Tab, Rfl: 3    furosemide (LASIX) 40 mg tablet, One daily, Disp: 90 Tab, Rfl: 3    potassium chloride (KLOR-CON M20) 20 mEq tablet, Take 1 Tab by mouth daily. , Disp: 90 Tab, Rfl: 4    buPROPion XL (WELLBUTRIN XL) 300 mg XL tablet, Take 1 Tab by mouth daily. , Disp: 90 Tab, Rfl: 3    Ranolazine 1,000 mg Tb12, Take 1,000 mg by mouth two (2) times a day., Disp: , Rfl:     pantoprazole (PROTONIX) 40 mg tablet, TAKE ONE (1) TABLET(S) ONCE DAILY, Disp: 90 Tab, Rfl: 3    insulin glargine (LANTUS) 100 unit/mL injection, by SubCUTAneous route nightly., Disp: , Rfl:     insulin lispro (HUMALOG) 100 unit/mL injection, by SubCUTAneous route., Disp: , Rfl:     cpap machine kit, by Does Not Apply route. autopap 5-20, Disp: , Rfl:     hydrALAZINE (APRESOLINE) 25 mg tablet, Take 25 mg by mouth three (3) times daily. , Disp: , Rfl:     PROAIR HFA 90 mcg/actuation inhaler, , Disp: , Rfl:     ONE TOUCH ULTRA TEST strip, , Disp: , Rfl:     SPIRIVA WITH HANDIHALER 18 mcg inhalation capsule, , Disp: , Rfl:     losartan (COZAAR) 100 mg tablet, Take 100 mg by mouth daily. , Disp: , Rfl:     NEEDLES, INSULIN DISPOSABLE (BD ULTRA-FINE JOSE PEN NEEDLES), by Does Not Apply route., Disp: , Rfl:     nitroglycerin (NITROSTAT) 0.4 mg SL tablet, by SubLINGual route every five (5) minutes as needed for Chest Pain., Disp: , Rfl:     isosorbide mononitrate ER (IMDUR) 30 mg tablet, Take 120 mg by mouth daily. , Disp: , Rfl:     tamsulosin (FLOMAX) 0.4 mg capsule, Take 0.4 mg by mouth daily. , Disp: , Rfl:     ASPIR-81 81 mg TbEC, take 81 mg by mouth daily.  , Disp: , Rfl:    Date Last Reviewed:  3/6/2017       Number of Personal Factors/Comorbidities that affect the Plan of Care: 3+: HIGH COMPLEXITY   EXAMINATION:   ROM:          WNL except some tightness in calves right greater than left  Strength:          Good except decreased power in left DF. DF - normal power today. TONGUE dyskinesia. Functional Mobility:         Gait/Ambulation:  Pt ambulates without assistive device and self reports only about 50 - 100' (will try a 6 mintue walk test). Pt shows bilateral heel scuff with small steps (decreased hip and core strength). Transfers:  independent        Bed Mobility:  NT  Mental Status:          Alert and oriented x 4. Occ decreased command following and motor processing. Impulsive. Vision:          Can see to drive without correction in the day. Getting glasses for his night vision. Body Structures Involved:  1. Nerves  2. Heart  3. Lungs  4. Bones  5. Joints  6. Muscles  7. Ligaments Body Functions Affected:  1. Mental  2. Sensory/Pain  3. Cardio  4. Respiratory  5. Neuromusculoskeletal  6. Movement Related Activities and Participation Affected:  1. Learning and Applying Knowledge  2. General Tasks and Demands  3. Mobility  4. Self Care  5. Domestic Life  6. Interpersonal Interactions and Relationships  7. Community, Social and [place]   Number of elements that affect the Plan of Care: 4+: HIGH COMPLEXITY   CLINICAL PRESENTATION:   Presentation: Evolving clinical presentation with unstable and unpredictable characteristics: HIGH COMPLEXITY   CLINICAL DECISION MAKING:   Outcome Measure: Tool Used: 6-Minute Walk Test   Score:  Initial: 495 Feet = TBD Meters  3 seated rest breaks  Most Recent: TBD Feet = TBD Meters    Interpretation of Score:    AGE  GENDER  MEAN  SD  NORMAL RANGE (2SD)    61-76  Male (15)   Female (22)  572   538  80   80  1-0   [phone]    66-77  Male (14)   Female (22)  527   471  80   69  46-65   [phone]    80-80  Male (8)   Female (15)  412   781  14   89  462-762   222-562        Tool Used: Alexis Balance Scale  Score:  Initial: 41/56 43/56  2/17/17   Interpretation of Score: Each section is scored on a 0-4 scale, 0 representing the patients inability to perform the task and 4 representing independence. The scores of each section are added together for a total score of 56. The higher the patients score, the more independent the patient is. Any score below 45 indicates increased risk for falls. Score 56 55-45 44-34 33-23 22-12 11-1 0   Modifier CH CI CJ CK CL CM CN     ? Mobility - Walking and Moving Around:     - CURRENT STATUS: CJ - 20%-39% impaired, limited or restricted    - GOAL STATUS: CI - 1%-19% impaired, limited or restricted    - D/C STATUS:  ---------------To be determined---------------    Tool Used: Dynamic Gait Index  Score:  Initial: 16/24    Interpretation of Score: Each section is scored on a 0-3 scale, 0 representing the patients inability to perform the task and 3 representing independence. The scores of each section are added together for a total score of 24. Any score below 19 indicates increased risk for falls. Score 24 23-19 18-15 14-10 9-5 4-1 0   Modifier CH CI CJ CK CL CM CN     Tool Used: Timed Up and Go (TUG)  Score:  Initial: 12.65 seconds  13.85   2/17/17   Interpretation of Score: The test measures, in seconds, the time taken by an individual to stand up from a standard arm chair (seat height 46 cm [18 in], arm height 65 cm [25.6 in]), walk a distance of 3 meters (118 in, approx 10 ft), turn, walk back to the chair and sit down. If the individual takes longer than 14 seconds to complete TUG, this indicates risk for falls. Score 7 7.5-10.5 11-14 14.5-17.5 18-21 21.5-24.5 25+   Modifier  CI CJ CK CL CM CN     Medical Necessity:   · Skilled intervention continues to be required due to [facility]. Reason for Services/Other Comments:  · Patient continues to require skilled intervention due to [id number] [address] DEFICIT.    Use of outcome tool(s) and clinical judgement create a POC that gives a: Difficult prediction of patient's progress: HIGH COMPLEXITY      S: \"I can walk all through [place] with no pain. TREATMENT:   (In addition to Assessment/Re-Assessment sessions the following treatments were rendered)  Therapeutic Exercise: ( 45 minutes):  Exercises per above or grid below to assess and improve mobility, strength and balance. Required moderate visual, verbal and tactile cues to promote proper body alignment and promote proper body mechanics. Progressed complexity of movement as indicated. THERAPEUTIC ACTIVITY: (0 minutes): Therapeutic activities per grid below to improve mobility, strength and balance. Required minimal verbal and tactile cues to to perform correctly and safely. Mayra Mar 2-24-17 Date:  3/3/17 Date:  3/6/17     Activity/Exercise      Nu Step Physiostep   Arms  Level 3  5 minutes - knee started to hurt but not calves Nu step  Arms :13  Seat: 11  Level: 1 (stay on this level but increase time 2* to cardiac precautions)  10 minutes no leg pain   Good breath support  O2 99% HR 85 bpm   Nu step  Arms :11  Seat: 11  Level: 1 (stay on this level but increase time 2* to cardiac precautions)  Pt alternated one arm occasionally. 12 minutes no leg pain   Good breath support  O2 98% HR 96 bpm   Supine bridge   x10 cues to slow down and breathe correctly   Heel/toe raises- for sensory awareness prep X 20 x20 x20   Standing lateral foot raises -  for sensory awareness prep X 20  x20 x20   Hip circles   Cannot comprehend   Seated ankle pumps -for sensory awareness prep and strengthening  X 20 B x20 B x20B   Standing hip extension  Yellow   2 x 10 B  Yellow x 20. Cues to keep knee straight. Yellow x 20. Cues to keep knee straight   Standing hip abduction Yellow   x20 B Yellow x 20. Cues to keep knee straight. Yellow x20 cues to keep knee straight   Forward lunge step - targets for feet. Large step length x20 each leg. Left leg forward better technique. Right leg forward no lunge with right leg.    One foot ahead of other  x30 second stand.  3 times each leg with seated rest after both sides. Having pt. count outloud for better breath support. Cues for bigger stepwith right leg forward  Forward back steps with target dots down for larger step. No hand hold. x10 each. Seated rest between. 1 x 30 seconds each leg. Stepping forward and back. Trying to get pt to not hold on. x10 each leg   Single leg stance 3 x 10 sec hold   Decreasing hand hold to tapping rail 3 x 10 seconds. Trying to get pt to count loud for better breath support. Counts too fast.  Tapping bars to steady. Standing forward/back bend X 10 with cues to SLOW down  And CGA     Calf stretch on incline board 5 x 20 sec      [place] walking 4 x 15'  Encouraging pt to take full steps and bring GIOVANNA over center vs. post 2 x10' stopping at each step. Holding wall but good big steps      Pre-Treatment Symptoms: Kay Wilson. reports no pain prior to therapy. No increase in pain by end of session. Treatment/Session Assessment:  Kay Wilson. feels much better now that he is off Doxapin. Less and less leg pain with walking. Still SOB but able to tolerate 12 minutes on the Nu step with low level resistance and walking farther without leg pain. Will keep resistance low and increase time 2* to cardiac blockage. Stronger. Working towards larger steps for better balance. · Post session pain:  No change in pain level  · Compliance with Program/Exercises: Will assess as treatment progresses. · Recommendations/Intent for next treatment session: \"Next visit will focus on progressive high level balance and LE sensorimotor awareness stretches and exercises. \".      PT Patient Time In/Time Out  Time In: 1300  Time Out: 101 [address],

## 2017-03-10 ENCOUNTER — HOSPITAL ENCOUNTER (OUTPATIENT)
Dept: PHYSICAL THERAPY | Age: 77
Discharge: HOME | End: 2017-03-10
Attending: INTERNAL MEDICINE
Payer: COMMERCIAL

## 2017-03-10 PROCEDURE — 97112 NEUROMUSCULAR REEDUCATION: CPT

## 2017-03-10 PROCEDURE — 97110 THERAPEUTIC EXERCISES: CPT

## 2017-03-10 PROCEDURE — 97530 THERAPEUTIC ACTIVITIES: CPT

## 2017-03-10 NOTE — PROGRESS NOTES
Mahendra Dawson. : [facility] [address]  [phone]   [phone]         OUTPATIENT OCCUPATIONAL THERAPY: Daily Note 3/10/2017     ICD-10: Treatment Diagnosis: Other lack of coordination (R27.8)  Repeated falls (R29.6)  Precautions/Allergies:   Pcn [penicillins]   Fall Risk Score: 10 (? 5 = High Risk)  MD Orders: OT evaluate and treat MEDICAL/REFERRING DIAGNOSIS:   history of falls  DATE OF ONSET: 3 months ago   REFERRING PHYSICIAN: Raulito Leos MD  RETURN PHYSICIAN APPOINTMENT: TBD     INITIAL ASSESSMENT:  Mr. Dulce Chester presents to occupational therapy services with hx of decreased function of B UEs with c/o frequently dropping objects. Pt also has recent hx of frequent falls and is being seen by physical therapy as well. Pt does have some impaired gross and fine motor coordination in B UE, but especially in the L UE. Pt is pleasant and cooperative, but does appear to have some decreased attention with tasks and decreased command following with activities. Pt also has some decreased strength in the L hand compared to the R hand. Pt also has diabetes with hx of some sensory loss in B hands. Pt will benefit from OT services to address stated goals and plan of care. PLAN OF CARE:   PROBLEM LIST:  1. Decreased ADL/Functional Activities  2. Decreased Ambulation Ability/Technique  3. Decreased Balance  4. Decreased Sesser with Home Exercise Program  5. Decreased Cognition  6. Impaired coordination INTERVENTIONS PLANNED:  1. Activities of daily living training  2. Adaptive equipment training  3. Balance training  4. Clothing management  5. Cognitive training  6. Neuromuscular re-eduation  7. Theraputic activity  8. Theraputic exercise  9.  Sensory Re-education    TREATMENT PLAN:  Effective Dates: 17 TO 17  Frequency/Duration: 2 times a week for 8 weeks  GOALS: (Goals have been discussed and agreed upon with patient.)  Short-Term Functional Goals: Time Frame:   1. Helen Showers. will complete 15 minutes of coordination activities for B UE with minimal cues to improve coordination for functional activity. 2. Helen Showers. will complete HEP with minimal cues to improve strength/coordination for ADL. [place]. with follow simple multiple step commands with activities with no cues to improve command following/attending to directions for daily activity. 4. Helen Showers. will complete self-feeding tasks including management of drinks and opening packages with modified independence. Discharge Goals: Time Frame:   1. Helen Showers. will complete 30 minutes of B UE coordination exercises/activities with supervision to improve functional use of hands for daily activity. 2. Helen Showers. will be modified independent with HEP to improve strength/coordination of B UEs. 3. Helen Showers. will demonstrate improved coordination in B hands as evidenced by ability to complete 9-hole pegboard in 30 seconds or less to show improved hand function for daily activity. Brad. will report 75% decrease in dropping objects with daily activity to improve hand function/strength for daily activity. Rehabilitation Potential For Stated Goals: Good  Regarding Luli Quintana Jr.'s therapy, I certify that the treatment plan above will be carried out by a therapist or under their direction. Thank you for this referral,  Kris Mcgowan OT                 The information in this section was collected on 2/17/17 (except where otherwise noted). OCCUPATIONAL PROFILE & HISTORY:   History of Present Injury/Illness (Reason for Referral):  Patient reports frequently dropping small objects such as car keys, dropping utensils when he is eating. States that this all began 3 months ago. Pt is currently seeing PT for falls (last fall 3 months ago).  Pt reports his hands don't feel stiff, numb, or weak. Pt is diabetic and is taking two doses of insulin per day. No hx of injury to either UE per patient report. Past Medical History/Comorbidities:   Mr. Tina Bingham  has a past medical history of Abdominal distension (11/5/2013); Acute gout (11/5/2013); Anemia; Anxiety (5/1/2013); Anxiety and depression (5/1/2013); BPH (benign prostatic hyperplasia) (11/5/2013); Bronchitis (11/5/2013); CAD (coronary artery disease) (11/5/2013); CAD (coronary artery disease) (11/5/2013); Carcinoma, lung ([facility] [address] (8/10/2015); Chronic kidney disease; CKD (chronic kidney disease) (11/5/2013); Conjunctivitis (11/5/2013); DEMENTIA; Depression; Diabetes ([address] Diabetes mellitus ([facility] [address] (5/1/2013); DJD (degenerative joint disease) (11/5/2013); Fatty liver (11/7/2013); Fatty liver (11/7/2013); Gall stone (11/5/2013); GERD (gastroesophageal reflux disease) (11/5/2013); GERD (gastroesophageal reflux disease) (11/5/2013); Hepatomegaly (11/5/2013); Hepatomegaly (11/5/2013); Hepatomegaly (11/5/2013); History of GI tumor (11/5/2013); Hypercholesterolemia; Hypertension; Hypoglycemia (11/5/2013); Hypoglycemia (11/5/2013); Insomnia (11/5/2013); Noncompliance (11/5/2013); PAD (peripheral artery disease) ([address] (11/5/2013); Persistent disorder of initiating or maintaining sleep (2/10/2015); Psychiatric disorder; and Urinary frequency (11/5/2013). He also has no past medical history of Heart failure ([address] or Other ill-defined conditions(799.89). Mr. Tina Bingham  has a past surgical history that includes colonoscopy; abdomen surgery proc unlisted; and vascular surgery procedure unlist (Right, 2/3/15). Social History/Living Environment:     Pt lives with wife. Pt has three adult children that live in [place], [place]. No assistive devices. Independent with ADL. Pt has a walker, walkin shower with grab with shower chair.  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 Pt driving all over [place].  Prior Level of Function/Work/Activity:  Independent and working 3 days per week  Personal Factors:          Overall Behavior:  Cooperative, pleasant, decreased command following with activity   Current Medications:    Current Outpatient Prescriptions:     atorvastatin (LIPITOR) 40 mg tablet, 40 mg., Disp: , Rfl:     amLODIPine (NORVASC) 5 mg tablet, , Disp: , Rfl:     BD INSULIN PEN NEEDLE UF ORIG 29 gauge x 1/2\" ndle, , Disp: , Rfl:     BD INSULIN SYRINGE ULTRA-FINE 1 mL 30 gauge x 1/2\" syrg, , Disp: , Rfl:     carvedilol (COREG) 12.5 mg tablet, , Disp: , Rfl:     clopidogrel (PLAVIX) 75 mg tablet, Take 1 Tab by mouth daily. , Disp: 90 Tab, Rfl: 3    furosemide (LASIX) 40 mg tablet, One daily, Disp: 90 Tab, Rfl: 3    potassium chloride (KLOR-CON M20) 20 mEq tablet, Take 1 Tab by mouth daily. , Disp: 90 Tab, Rfl: 4    buPROPion XL (WELLBUTRIN XL) 300 mg XL tablet, Take 1 Tab by mouth daily. , Disp: 90 Tab, Rfl: 3    Ranolazine 1,000 mg Tb12, Take 1,000 mg by mouth two (2) times a day., Disp: , Rfl:     pantoprazole (PROTONIX) 40 mg tablet, TAKE ONE (1) TABLET(S) ONCE DAILY, Disp: 90 Tab, Rfl: 3    insulin glargine (LANTUS) 100 unit/mL injection, by SubCUTAneous route nightly., Disp: , Rfl:     insulin lispro (HUMALOG) 100 unit/mL injection, by SubCUTAneous route., Disp: , Rfl:     cpap machine kit, by Does Not Apply route. autopap 5-20, Disp: , Rfl:     hydrALAZINE (APRESOLINE) 25 mg tablet, Take 25 mg by mouth three (3) times daily. , Disp: , Rfl:     PROAIR HFA 90 mcg/actuation inhaler, , Disp: , Rfl:     ONE TOUCH ULTRA TEST strip, , Disp: , Rfl:     SPIRIVA WITH HANDIHALER 18 mcg inhalation capsule, , Disp: , Rfl:     losartan (COZAAR) 100 mg tablet, Take 100 mg by mouth daily. , Disp: , Rfl:     NEEDLES, INSULIN DISPOSABLE (BD ULTRA-FINE JOSE PEN NEEDLES), by Does Not Apply route., Disp: , Rfl:     nitroglycerin (NITROSTAT) 0.4 mg SL tablet, by SubLINGual route every five (5) minutes as needed for Chest Pain., Disp: , Rfl:     isosorbide mononitrate ER (IMDUR) 30 mg tablet, Take 120 mg by mouth daily. , Disp: , Rfl:     tamsulosin (FLOMAX) 0.4 mg capsule, Take 0.4 mg by mouth daily. , Disp: , Rfl:     ASPIR-81 81 mg TbEC, take 81 mg by mouth daily. , Disp: , Rfl:             Date Last Reviewed:  2/17/17   Complexity Level : Expanded review of therapy/medical records (1-2):  MODERATE COMPLEXITY   ASSESSMENT OF OCCUPATIONAL PERFORMANCE:     ROM/Strength:     Date:  2/17/17 Date:  2/17/17  Date:  2/17/17 Date:  2/17/17    AROM AROM  STRENGTH STRENGTH   Movement RIGHT LEFT  RIGHT LEFT   SHOULDER:        Flexion  WNL WNL  5 5   Abduction  WNL WNL  5 5   ELBOW:        Flexion  WNL WNL  5 5   Extension  WNL WNL  5 5   WRIST:        Wrist extension  WNL WNL  5 5   HAND:        Hand Strength:            82 lbs 67 lbs    THREE JAW SHANNA PINCH    unable unable    LATERAL PINCH     14 lbs  11 lbs      Functional Mobility:         Gait/Ambulation:  No assistive device        Transfers:  Supervision to modified independence   Coordination: Decreased coordination with rapid alternating movements         Coordination: Tool Used: Five Time Finger to Nose  Score:  Initial: R: 5 (s) L: 5 (s) Most Recent: TBD   Interpretation of Score: Dysmetria to the L of nose with L UE        Tool Used: 9-hole pegboard    Score:  Initial: R: 31 seconds (dropped pegs x 2)   L: 68 seconds and assists with R hand 75% of the time Most Recent: TBD   Interpretation of Score:Impaired coordination bilaterally (L>R)     Tool Used:  [employer]  Test  Score:  Initial: R: 28 (s) L: 41 (s) Most Recent: TBD   Interpretation of Score: Impaired bilaterally (L>R)       Sensation: Reports occasional numbness in L thumb         Alpha-Parveen Monofilament:  2.83: Only able to identify with L thumb  3.61: Able to identify 100% bilaterally    Balance:          Frequent falls; See PT chart  Activities of Daily Living:           Basic ADLs (From Assessment) Complex ADLs (From Assessment)         Grooming/Bathing/Dressing Activities of Daily Living                                        Physical Skills Involved:  1. Balance  2. Mobility  3. Fine or Gross Motor Coordination  4. Sensation  5. Dexterity Cognitive Skills Affected (resulting in the inability to perform in a timely and safe manner):  1. Attending  2. Understanding  3. Problem Solving  4. Mental Sequencing Psychosocial Skills Affected:  1. Routines and Behaviors   Number of elements that affect the Plan of Care: 5+:  HIGH COMPLEXITY   CLINICAL DECISION MAKING:   Outcome Measure: Tool Used: [address] [facility] [address] [address] AM-[facility] Daily Activity Outpatient Short Form  Score:  Initial: 40 Most Recent: X (Date: -- )   Interpretation of Tool:  Represents clinically-significant functional categories (i.e., basic and instrumental activities of daily living, fine motor activities). Score 60 59-55 54-47 46-34 32-21 20-16 15   Modifier CH CI CJ CK CL CM CN     ? Carrying, Moving, and Handling Objects:     - CURRENT STATUS: CK - 40%-59% impaired, limited or restricted    - GOAL STATUS: CJ - 20%-39% impaired, limited or restricted    - D/C STATUS:  ---------------To be determined---------------    Medical Necessity:   · Patient demonstrates excellent rehab potential due to higher previous functional level. Reason for Services/Other Comments:  · Patient continues to require skilled intervention due to decreased functional use of B hands with daily activity.   Clinical Decision-Making Assessment:     Use of outcome tool(s) and clinical judgement create a POC that gives a: Questionable prediction of patient's progress: MODERATE COMPLEXITY   TREATMENT:   (In addition to Assessment/Re-Assessment sessions the following treatments were rendered)    Pre-treatment Symptoms/Complaints:  No complaints  Pain: Initial:   Pain Intensity 1: 0 0/10 Post Session:  0/10     Therapeutic Activity: (15 minutes):    Date:  3/3/17 Date:  3/10/17 Date:     Activity/Exercise Parameters Parameters Parameters   Sensory Bin Pt completed reaching in sensory bin to find objects with vision occluded. Pt completed with R hand retrieving 7/10 objects and   L hand 8/10 objects      Visual Pegboard Pattern  Pt completed placement of small pegs in pegboard with picture to match design; Pt needed maximal cues for spacing and for completing the pattern. Pt completed placement with hand needing assistance with R hand each time                                    Therapeutic Exercise: ( 15):  Exercises per grid below to improve mobility, strength and coordination. Required minimal visual, verbal, manual and tactile cues to promote proper body alignment and promote proper body mechanics. Progressed resistance, repetitions and complexity of movement as indicated. Date:  3/3/17 Date:  3/6/17 Date:  3/10/17   Activity/Exercise Parameters Parameters Parameters   Putty Press B hands x for 2 minutes pressing in putty with cone;   1 minute each hand with pencil holding in tripod grasp     Finger Abduction  15 reps with B hands with moderate assistance to help with motor control of digits      Resistance Clips 25 reps black clip with R hand with three jaw hardy (additional time and minimal tactile cues)  25 reps with blue clip with L hand Rotating in the hand x 10 reps and placing to target    UBE  5.5 minutes at 2.0 resistance  5 minutes at 3.0    Wall Push-ups   15 reps   15 reps with c/o fatigue in the elbows   PNF Strengthening   D2 flexion/extension; 10 reps with 1lb ball with fatigue  D2 flexion/extension x 15 reps with yellow band; moderate visual/tactile cues             Neuromuscular Re-education: (15):  Exercise/activities per grid below to improve coordination, kinesthetic sense and proprioception.   Required minimal to moderate visual, verbal, manual and tactile cues to promote motor control of bilateral, upper extremity(s). Date:  3/3/17 Date:  3/6/17 Date:     Activity/Exercise Parameters Parameters Parameters   Stacking blocks  Pt required moderate cueing throughout with visual/verbal cues; pt drops blocks 75% of the time and has difficulty steadying hand to stack blocks     Turning Cards Additional time with B hands (30 reps each)     Finger Taps  Individiual with minimal to moderate tactile cues x 10 reps each digit      Door Knobs 10 reps with 1 lb weighted balls x 2 sets (first set slow and 2nd set increasing speed 10 reps slowed; 10 reps fast     Light Bulbs 10 reps B UE holding 1 lb ball     Punches  2 sets x 10 reps with 1 lb ball with moderate cues to improve speed in L UE 2 sets x 10 reps alternating; 1 set x 10 reps each UE     Scarf toss  10 reps additional time with pt dropping scarf 75% of the time 10 reps to each hand with pt dropping 60% of the time    PNF  10 reps with 1 lb weights     Beans   15 beans per hand with additional time; pt drops ~30% of the time    Scarf Rolls   15 reps with B hands    Pegboard Placement   Placement of 25 pegs with R and removal with left with moderate cues; pt attempted placement on with the L but unable due to fatigue in L UE; Pt needs  Constant cues to grasp two simultaneously   Pencil Coordination activities   Up/down; rotation x 20 reps each hand   Chinese Balls   Attempted in B hands but unable to fully rotate in hands; poor mobility in ulnar side of B hands   Ball Toss   20 reps with pt bouncing ball with L hand and catching ball with B hands; drops ~50% of the time     Treatment/Session Assessment:    · Response to Treatment:   Patient continues to work on activities to improve strength and activity tolerance. Pt demonstrates poor endurance with activity and needs additional rest breaks.  Pt seems to lack some awareness of difficulties with coordination in B hands, stating while he was completing activity \"I didn't know it was this bad.' Pt is very pleasant and appears motivated with activity but need a fair amount of cueing to follow directions. Pt demonstrates limitations with visual/cognitive skills needing maximal to total cues to correctly complete pattern board. Pt unable to identify correct colors or place in right spacing. Pt to continue per plan of care. · Compliance with Program/Exercises: Will assess as treatment progresses. · Recommendations/Intent for next treatment session: \"Next visit will focus on advancements to more challenging activities and reduction in assistance provided\".   Total Treatment Duration:  OT Patient Time In/Time Out  Time In: 0905  Time Out: [address] W, OT

## 2017-03-13 ENCOUNTER — HOSPITAL ENCOUNTER (OUTPATIENT)
Dept: PHYSICAL THERAPY | Age: 77
Discharge: HOME | End: 2017-03-13
Attending: INTERNAL MEDICINE
Payer: COMMERCIAL

## 2017-03-13 PROCEDURE — 97110 THERAPEUTIC EXERCISES: CPT

## 2017-03-13 PROCEDURE — 97112 NEUROMUSCULAR REEDUCATION: CPT

## 2017-03-13 NOTE — PROGRESS NOTES
Mariah Villela. : [facility] at [address]  [phone]   [phone]         OUTPATIENT OCCUPATIONAL THERAPY: Daily Note 3/13/2017     ICD-10: Treatment Diagnosis: Other lack of coordination (R27.8)  Repeated falls (R29.6)  Precautions/Allergies:   Pcn [penicillins]   Fall Risk Score: 10 (? 5 = High Risk)  MD Orders: OT evaluate and treat MEDICAL/REFERRING DIAGNOSIS:   history of falls  DATE OF ONSET: 3 months ago   REFERRING PHYSICIAN: Nani Raphael MD  RETURN PHYSICIAN APPOINTMENT: TBD     INITIAL ASSESSMENT:  Mr. Tina Bingham presents to occupational therapy services with hx of decreased function of B UEs with c/o frequently dropping objects. Pt also has recent hx of frequent falls and is being seen by physical therapy as well. Pt does have some impaired gross and fine motor coordination in B UE, but especially in the L UE. Pt is pleasant and cooperative, but does appear to have some decreased attention with tasks and decreased command following with activities. Pt also has some decreased strength in the L hand compared to the R hand. Pt also has diabetes with hx of some sensory loss in B hands. Pt will benefit from OT services to address stated goals and plan of care. PLAN OF CARE:   PROBLEM LIST:  1. Decreased ADL/Functional Activities  2. Decreased Ambulation Ability/Technique  3. Decreased Balance  4. Decreased [place] with Home Exercise Program  5. Decreased Cognition  6. Impaired coordination INTERVENTIONS PLANNED:  1. Activities of daily living training  2. Adaptive equipment training  3. Balance training  4. Clothing management  5. Cognitive training  6. Neuromuscular re-eduation  7. Theraputic activity  8. Theraputic exercise  9.  Sensory Re-education    TREATMENT PLAN:  Effective Dates: 17 TO 17  Frequency/Duration: 2 times a week for 8 weeks  GOALS: (Goals have been discussed and agreed upon with patient.)  Short-Term Functional Goals: Time Frame:   1. [place]. will complete 15 minutes of coordination activities for B UE with minimal cues to improve coordination for functional activity. 2. [place]. will complete HEP with minimal cues to improve strength/coordination for ADL. [place]. with follow simple multiple step commands with activities with no cues to improve command following/attending to directions for daily activity. 4. [place]. will complete self-feeding tasks including management of drinks and opening packages with modified independence. Discharge Goals: Time Frame:   1. [place]. will complete 30 minutes of B UE coordination exercises/activities with supervision to improve functional use of hands for daily activity. 2. [place]. will be modified independent with HEP to improve strength/coordination of B UEs. 3. [place]. will demonstrate improved coordination in B hands as evidenced by ability to complete 9-hole pegboard in 30 seconds or less to show improved hand function for daily activity. [place]. will report 75% decrease in dropping objects with daily activity to improve hand function/strength for daily activity. Rehabilitation Potential For Stated Goals: Good  Regarding Claude Desai Jr.'s therapy, I certify that the treatment plan above will be carried out by a therapist or under their direction. Thank you for this referral,  Yunior Ryees OT                 The information in this section was collected on 2/17/17 (except where otherwise noted). OCCUPATIONAL PROFILE & HISTORY:   History of Present Injury/Illness (Reason for Referral):  Patient reports frequently dropping small objects such as car keys, dropping utensils when he is eating. States that this all began 3 months ago. Pt is currently seeing PT for falls (last fall 3 months ago).  Pt reports his hands don't feel stiff, numb, or weak. Pt is diabetic and is taking two doses of insulin per day. No hx of injury to either UE per patient report. Past Medical History/Comorbidities:   Mr. Britany Trevino  has a past medical history of Abdominal distension (11/5/2013); Acute gout (11/5/2013); Anemia; Anxiety (5/1/2013); Anxiety and depression (5/1/2013); BPH (benign prostatic hyperplasia) (11/5/2013); Bronchitis (11/5/2013); CAD (coronary artery disease) (11/5/2013); CAD (coronary artery disease) (11/5/2013); Carcinoma, lung ([facility] [address] [address] (8/10/2015); Chronic kidney disease; CKD (chronic kidney disease) (11/5/2013); Conjunctivitis (11/5/2013); DEMENTIA; Depression; Diabetes ([address] Diabetes mellitus ([facility] [address] (5/1/2013); DJD (degenerative joint disease) (11/5/2013); Fatty liver (11/7/2013); Fatty liver (11/7/2013); Gall stone (11/5/2013); GERD (gastroesophageal reflux disease) (11/5/2013); GERD (gastroesophageal reflux disease) (11/5/2013); Hepatomegaly (11/5/2013); Hepatomegaly (11/5/2013); Hepatomegaly (11/5/2013); History of GI tumor (11/5/2013); Hypercholesterolemia; Hypertension; Hypoglycemia (11/5/2013); Hypoglycemia (11/5/2013); Insomnia (11/5/2013); Noncompliance (11/5/2013); PAD (peripheral artery disease) ([address] (11/5/2013); Persistent disorder of initiating or maintaining sleep (2/10/2015); Psychiatric disorder; and Urinary frequency (11/5/2013). He also has no past medical history of Heart failure ([address] or Other ill-defined conditions(799.89). Mr. Britany Trevino  has a past surgical history that includes colonoscopy; abdomen surgery proc unlisted; and vascular surgery procedure unlist (Right, 2/3/15). Social History/Living Environment:     Pt lives with wife. Pt has three adult children that live in [facility]. No assistive devices. Independent with ADL. Pt has a walker, walkin shower with grab with shower chair.  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  Prior Level of Function/Work/Activity:  Independent and working 3 days per week  Personal Factors:          Overall Behavior:  Cooperative, pleasant, decreased command following with activity   Current Medications:    Current Outpatient Prescriptions:     atorvastatin (LIPITOR) 40 mg tablet, 40 mg., Disp: , Rfl:     amLODIPine (NORVASC) 5 mg tablet, , Disp: , Rfl:     BD INSULIN PEN NEEDLE UF ORIG 29 gauge x 1/2\" ndle, , Disp: , Rfl:     BD INSULIN SYRINGE ULTRA-FINE 1 mL 30 gauge x 1/2\" syrg, , Disp: , Rfl:     carvedilol (COREG) 12.5 mg tablet, , Disp: , Rfl:     clopidogrel (PLAVIX) 75 mg tablet, Take 1 Tab by mouth daily. , Disp: 90 Tab, Rfl: 3    furosemide (LASIX) 40 mg tablet, One daily, Disp: 90 Tab, Rfl: 3    potassium chloride (KLOR-CON M20) 20 mEq tablet, Take 1 Tab by mouth daily. , Disp: 90 Tab, Rfl: 4    buPROPion XL (WELLBUTRIN XL) 300 mg XL tablet, Take 1 Tab by mouth daily. , Disp: 90 Tab, Rfl: 3    Ranolazine 1,000 mg Tb12, Take 1,000 mg by mouth two (2) times a day., Disp: , Rfl:     pantoprazole (PROTONIX) 40 mg tablet, TAKE ONE (1) TABLET(S) ONCE DAILY, Disp: 90 Tab, Rfl: 3    insulin glargine (LANTUS) 100 unit/mL injection, by SubCUTAneous route nightly., Disp: , Rfl:     insulin lispro (HUMALOG) 100 unit/mL injection, by SubCUTAneous route., Disp: , Rfl:     cpap machine kit, by Does Not Apply route. autopap 5-20, Disp: , Rfl:     hydrALAZINE (APRESOLINE) 25 mg tablet, Take 25 mg by mouth three (3) times daily. , Disp: , Rfl:     PROAIR HFA 90 mcg/actuation inhaler, , Disp: , Rfl:     ONE TOUCH ULTRA TEST strip, , Disp: , Rfl:     SPIRIVA WITH HANDIHALER 18 mcg inhalation capsule, , Disp: , Rfl:     losartan (COZAAR) 100 mg tablet, Take 100 mg by mouth daily. , Disp: , Rfl:     NEEDLES, INSULIN DISPOSABLE (BD ULTRA-FINE JOSE PEN NEEDLES), by Does Not Apply route., Disp: , Rfl:     nitroglycerin (NITROSTAT) 0.4 mg SL tablet, by SubLINGual route every five (5) minutes as needed for Chest Pain., Disp: , Rfl:     isosorbide mononitrate ER (IMDUR) 30 mg tablet, Take 120 mg by mouth daily. , Disp: , Rfl:     tamsulosin (FLOMAX) 0.4 mg capsule, Take 0.4 mg by mouth daily. , Disp: , Rfl:     ASPIR-81 81 mg TbEC, take 81 mg by mouth daily. , Disp: , Rfl:             Date Last Reviewed:  2/17/17   Complexity Level : Expanded review of therapy/medical records (1-2):  MODERATE COMPLEXITY   ASSESSMENT OF OCCUPATIONAL PERFORMANCE:     ROM/Strength:     Date:  2/17/17 Date:  2/17/17  Date:  2/17/17 Date:  2/17/17    AROM AROM  STRENGTH STRENGTH   Movement RIGHT LEFT  RIGHT LEFT   SHOULDER:        Flexion  WNL WNL  5 5   Abduction  WNL WNL  5 5   ELBOW:        Flexion  WNL WNL  5 5   Extension  WNL WNL  5 5   WRIST:        Wrist extension  WNL WNL  5 5   HAND:        Hand Strength:            82 lbs 67 lbs    THREE JAW SHANNA PINCH    unable unable    LATERAL PINCH     14 lbs  11 lbs      Functional Mobility:         Gait/Ambulation:  No assistive device        Transfers:  Supervision to modified independence   Coordination: Decreased coordination with rapid alternating movements         Coordination: Tool Used: Five Time Finger to Nose  Score:  Initial: R: 5 (s) L: 5 (s) Most Recent: TBD   Interpretation of Score: Dysmetria to the L of nose with L UE        Tool Used: 9-hole pegboard    Score:  Initial: R: 31 seconds (dropped pegs x 2)   L: 68 seconds and assists with R hand 75% of the time Most Recent: TBD   Interpretation of Score:Impaired coordination bilaterally (L>R)     Tool Used:  Storific  Test  Score:  Initial: R: 28 (s) L: 41 (s) Most Recent: TBD   Interpretation of Score: Impaired bilaterally (L>R)       Sensation: Reports occasional numbness in L thumb         [place] Monofilament:  2.83: Only able to identify with L thumb  3.61: Able to identify 100% bilaterally    Balance:          Frequent falls; See PT chart  Activities of Daily Living:           Basic ADLs (From Assessment) Complex ADLs (From Assessment)         Grooming/Bathing/Dressing Activities of Daily Living                                        Physical Skills Involved:  1. Balance  2. Mobility  3. Fine or Gross Motor Coordination  4. Sensation  5. Dexterity Cognitive Skills Affected (resulting in the inability to perform in a timely and safe manner):  1. Attending  2. Understanding  3. Problem Solving  4. Mental Sequencing Psychosocial Skills Affected:  1. Routines and Behaviors   Number of elements that affect the Plan of Care: 5+:  HIGH COMPLEXITY   CLINICAL DECISION MAKING:   Outcome Measure: Tool Used: [address] [facility] [address] [address] [facility] Daily Activity Outpatient Short Form  Score:  Initial: 40 Most Recent: X (Date: -- )   Interpretation of Tool:  Represents clinically-significant functional categories (i.e., basic and instrumental activities of daily living, fine motor activities). Score 60 59-55 54-47 46-34 32-21 20-16 15   Modifier CH CI CJ CK CL CM CN     ? Carrying, Moving, and Handling Objects:     - CURRENT STATUS: CK - 40%-59% impaired, limited or restricted    - GOAL STATUS: CJ - 20%-39% impaired, limited or restricted    - D/C STATUS:  ---------------To be determined---------------    Medical Necessity:   · Patient demonstrates excellent rehab potential due to higher previous functional level. Reason for Services/Other Comments:  · Patient continues to require skilled intervention due to decreased functional use of B hands with daily activity.   Clinical Decision-Making Assessment:     Use of outcome tool(s) and clinical judgement create a POC that gives a: Questionable prediction of patient's progress: MODERATE COMPLEXITY   TREATMENT:   (In addition to Assessment/Re-Assessment sessions the following treatments were rendered)    Pre-treatment Symptoms/Complaints:  No complaints  Pain: Initial:   Pain Intensity 1: (P) 0 0/10 Post Session:  0/10     Therapeutic Activity: (9 minutes):    Date:  3/3/17 Date:  3/10/17 Date:  3/13/17   Activity/Exercise Parameters Parameters Parameters   Sensory Bin Pt completed reaching in sensory bin to find objects with vision occluded. Pt completed with R hand retrieving 7/10 objects and   L hand 8/10 objects      Visual Pegboard Pattern  Pt completed placement of small pegs in pegboard with picture to match design; Pt needed maximal cues for spacing and for completing the pattern. Pt completed placement with hand needing assistance with R hand each time Pt completed with maximal cues for simple pattern with poor ability to identify placement of pegs in appropriate slots with decrease visuospatial awareness as well as poor ability to follow commands                                     Therapeutic Exercise: ( 6 ):  Exercises per grid below to improve mobility, strength and coordination. Required minimal visual, verbal, manual and tactile cues to promote proper body alignment and promote proper body mechanics. Progressed resistance, repetitions and complexity of movement as indicated.    Date:  3/3/17 Date:  3/6/17 Date:  3/10/17 Date:  3/13/17   Activity/Exercise Parameters Parameters Parameters    Putty Press B hands x for 2 minutes pressing in putty with cone;   1 minute each hand with pencil holding in tripod grasp      Finger Abduction  15 reps with B hands with moderate assistance to help with motor control of digits       Resistance Clips 25 reps black clip with R hand with three jaw hardy (additional time and minimal tactile cues)  25 reps with blue clip with L hand Rotating in the hand x 10 reps and placing to target     UBE  5.5 minutes at 2.0 resistance  5 minutes at 3.0  6 minutes at 2.0 resistance with 3 minutes forwards/3 minutes backwards   Wall Push-ups   15 reps   15 reps with c/o fatigue in the elbows    PNF Strengthening   D2 flexion/extension; 10 reps with 1lb ball with fatigue  D2 flexion/extension x 15 reps with yellow band; moderate visual/tactile cues               Neuromuscular Re-education: (25):  Exercise/activities per grid below to improve coordination, kinesthetic sense and proprioception. Required minimal to moderate visual, verbal, manual and tactile cues to promote motor control of bilateral, upper extremity(s).             Date:  3/3/17 Date:  3/6/17 Date:   Date:  3/13/17   Activity/Exercise Parameters Parameters Parameters    Stacking blocks  Pt required moderate cueing throughout with visual/verbal cues; pt drops blocks 75% of the time and has difficulty steadying hand to stack blocks   1 cm blocks with each hand (therapist holding R UE to prevent assisting L UE); additional time to stabilize and place blocks; pt completed for 5 sets stacking 5 blocks    Turning Cards Additional time with B hands (30 reps each)   20 reps each hand to work on speed and coordination    Finger Taps  Individiual with minimal to moderate tactile cues x 10 reps each digit       Door Knobs 10 reps with 1 lb weighted balls x 2 sets (first set slow and 2nd set increasing speed 10 reps slowed; 10 reps fast      Light Bulbs 10 reps B UE holding 1 lb ball      Punches  2 sets x 10 reps with 1 lb ball with moderate cues to improve speed in L UE 2 sets x 10 reps alternating; 1 set x 10 reps each UE   10 reps to each side; 10 reps cross punches;  10 reps alternating and calling out colors    Scarf toss  10 reps additional time with pt dropping scarf 75% of the time 10 reps to each hand with pt dropping 60% of the time  10 reps to each hand with pt dropping ~40% of the time   Rolling Dice    10 reps each hand   PNF  10 reps with 1 lb weights      Beans   15 beans per hand with additional time; pt drops ~30% of the time     Scarf Rolls   15 reps with B hands     Pegboard Placement   Placement of 25 pegs with R and removal with left with moderate cues; pt attempted placement on with the L but unable due to fatigue in L UE; Pt needs  Constant cues to grasp two simultaneously    Pencil Coordination activities   Up/down; rotation x 20 reps each hand    Chinese Balls   Attempted in B hands but unable to fully rotate in hands; poor mobility in ulnar side of B hands    Ball Toss   20 reps with pt bouncing ball with L hand and catching ball with B hands; drops ~50% of the time    UE Coordination Routine     1. Wrist flexion/extension coordination B UE  2. Forearm rotation B UE  3. Picking fruit -reaching side to side  4. Rapid alternating movement  5. Hamburger patties  6. Finger taps in opposition   10-15 reps each    Overhead stick beat    2 sets x 15 reps      Treatment/Session Assessment:    · Response to Treatment: Pt is the most pleasant gentleman. Pt needs additional time due to rest breaks due to shortness of breath. Poor ability to perform breathing techniques with exercises. Pt needs moderate verbal/visual cues with activities/exercises. Pt also continues to demonstrate poor ability to copy visual pattern. Pt to continue per plan of care. · Compliance with Program/Exercises: Will assess as treatment progresses. · Recommendations/Intent for next treatment session: \"Next visit will focus on advancements to more challenging activities and reduction in assistance provided\".   Total Treatment Duration:  OT Patient Time In/Time Out  Time In: (P) 1400  Time Out: (P) 733 Gurinder Cervantes, OT

## 2017-03-13 NOTE — PROGRESS NOTES
Rehana Bailey. : 1940 [facility] [address] [address] [facility]  [phone]   [phone]          OUTPATIENT PHYSICAL THERAPY:Progress Report 3/13/2017    ICD-10: Treatment Diagnosis: Repeated falls (R29.6)  Unsteadiness on feet (R26.81)  Unspecified lack of coordination (R27.9)  Precautions/Allergies:   Pcn [penicillins] Nitroglycerin with pt at all times - CP maybe every 2 weeks with strenuous walking or activity. Fall Risk Score: 10 (? 5 = High Risk)  MD Orders: Outpatient PT referral MEDICAL/REFERRING DIAGNOSIS:  history of falls   DATE OF ONSET: Few months  REFERRING PHYSICIAN: Sterling Guadalupe MD  RETURN PHYSICIAN APPOINTMENT: unknown  DATE OF PROGRESS NOTE:    RECERTIFICATION DATE:    PROGRESS:  Pt has attended 9 physical therapy sessions from 17 to date including initial assessment. Sessions consist of range of motion, therapeutic activity, therapeutic exercise, balance and gait refinement. Pt has shown improvement in static standing balance. Pt has increased Alexis Balance Score by 2 points indicating improved static standing balance and decreased risk for fall. Pt has great difficulty following commands for exercise and testing. Pt states he is a little hard of hearing yet he never asks me to repeat myself. Pt did worse on his TUG but I am not sure he was understanding the commands and it was administered twice. Dynamic Gait Index was about the same. Pt has been compliant with his exercises and would benefit from at least one more month to try to maximize functional and balance ability. INITIAL ASSESSMENT:  Mr. Roya Berry presents with increased instance of falling, maybe 2 per month. Pt reports a fall when he was leaning over and one going down steps without lights on which is more of an accident vs balance loss but does speak to decreased sensory awareness with vision removed.   Pt has multiple medical problems contributing to his deficits such as his diabetes, PAD with pain in legs walking 50 - 100', CAD with ~biweekly chest pain needing Nitroglycerin. 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 Pt show decreased command following and motor processing. Pt's biggest complaint to me are his hands - he states he can be holding something and just drop it without knowing. Pt will benefit from OT consult for this. Pt presents with gait and balance deficit and functional hip weakness. Pt will benefit from PT to maximize ability and safety with mobility    PROBLEM LIST (Impacting functional limitations):  1. Decreased Strength  2. Decreased ADL/Functional Activities  3. Decreased Transfer Abilities  4. Decreased Ambulation Ability/Technique  5. Decreased Balance  6. Increased Pain  7. Decreased Activity Tolerance  8. Increased Fatigue  9. Increased Shortness of Breath  10. Decreased Flexibility/Joint Mobility  11. Decreased Knowledge of Precautions  12. Decreased Isabel with Home Exercise Program  13. Decreased Cognition INTERVENTIONS PLANNED:  1. Balance Exercise  2. Gait Training  3. Home Exercise Program (HEP)  4. Neuromuscular Re-education/Strengthening  5. Range of Motion (ROM)  6. Therapeutic Activites  7. Therapeutic Exercise/Strengthening  8. Transfer Training  9. possible orthotic consult   TREATMENT PLAN:  Effective Dates: 1/19/17 TO 4/14/17. Frequency/Duration: 2 times a week as able for this patient. (He rarely knows his work schedule more than a couple of day in advance and can be needed to drive nearly any day of the week) for 12 weeks  GOALS: (Goals have been discussed and agreed upon with patient.)  Short-Term Functional Goals: Time Frame: 4 weeks  1.  Pt will be independent with range of motion, strength and balance home exercise program.  STATUS:  MET  Discharge Goals: Time Frame: 8 weeks  Pt will show increased range of motion and improved posture to allow for improved safety and ability with all functional mobility. STATUS:  MET  Pt will decrease TUG score indicating more normalized gait pattern and decrease risk for falls. STATUS:  NOT MET, CONT 4 WEEKS. Pt will increase Alexis Balance Scale to 48/56 indicating increased balance and decreased risk for falls. STATUS:  NOT MET, CONT 4 WEEKS. Pt will increase Dynamic Gait Index to 19/24 indicating increase gait balance and decreased risk for falls. STATUS:  NOT MET, CONT 4 WEEKS. Pt will be able to ambulate increased community distances with least restrictive assistive device independent and safe as evidenced by increased distance on the 6 minute walk test.  STATUS:  NOT TESTED, CONT 4 WEEKS. Rehabilitation Potential For Stated Goals: Fair              HISTORY:   GOAL:  \"To get my fingers working better. Like to walk better. 2/17/17:  \"I feel like I am walking better except for on Monday and Tuesday. \"  Present Symptoms:    Falling 1 - 2 months - Kind of stoop over and put a tag on the car and got right back up. Sometimes I get dizzy when my sugar is low. Check it twice a day. Going down the sairs and I think I tripped. 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 Can drive 4 - 5 hours one way and then come back. History of Present Injury/Illness (Reason for Referral):    Past Medical History/Comorbidities:   Mr. Dulce Chester  has a past medical history of Abdominal distension (11/5/2013); Acute gout (11/5/2013); Anemia; Anxiety (5/1/2013); Anxiety and depression (5/1/2013); BPH (benign prostatic hyperplasia) (11/5/2013); Bronchitis (11/5/2013); CAD (coronary artery disease) (11/5/2013); CAD (coronary artery disease) (11/5/2013); Carcinoma, lung ([address] (8/10/2015); Chronic kidney disease; CKD (chronic kidney disease) (11/5/2013);  Conjunctivitis (11/5/2013); DEMENTIA; Depression; Diabetes ([facility] Diabetes mellitus ([facility] (5/1/2013); DJD (degenerative joint disease) (11/5/2013); Fatty liver (11/7/2013); Fatty liver (11/7/2013); Gall stone (11/5/2013); GERD (gastroesophageal reflux disease) (11/5/2013); GERD (gastroesophageal reflux disease) (11/5/2013); Hepatomegaly (11/5/2013); Hepatomegaly (11/5/2013); Hepatomegaly (11/5/2013); History of GI tumor (11/5/2013); Hypercholesterolemia; Hypertension; Hypoglycemia (11/5/2013); Hypoglycemia (11/5/2013); Insomnia (11/5/2013); Noncompliance (11/5/2013); PAD (peripheral artery disease) ([facility] (11/5/2013); Persistent disorder of initiating or maintaining sleep (2/10/2015); Psychiatric disorder; and Urinary frequency (11/5/2013). He also has no past medical history of Heart failure ([facility] or Other ill-defined conditions(799.89). Mr. Jimbo Oquendo  has a past surgical history that includes colonoscopy; abdomen surgery proc unlisted; and vascular surgery procedure unlist (Right, 2/3/15). Social History/Living Environment:       Social History     Social History    Marital status:      Spouse name: N/A    Number of children: N/A    Years of education: N/A     Occupational History    Not on file. 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   Up to 2 days pers week 4 - 5 hours stints twice a day    Current Medications:    Current Outpatient Prescriptions:     atorvastatin (LIPITOR) 40 mg tablet, 40 mg., Disp: , Rfl:     amLODIPine (NORVASC) 5 mg tablet, , Disp: , Rfl:     BD INSULIN PEN NEEDLE UF ORIG 29 gauge x 1/2\" ndle, , Disp: , Rfl:     BD INSULIN SYRINGE ULTRA-FINE 1 mL 30 gauge x 1/2\" syrg, , Disp: , Rfl:     carvedilol (COREG) 12.5 mg tablet, , Disp: , Rfl:     clopidogrel (PLAVIX) 75 mg tablet, Take 1 Tab by mouth daily. , Disp: 90 Tab, Rfl: 3    furosemide (LASIX) 40 mg tablet, One daily, Disp: 90 Tab, Rfl: 3    potassium chloride (KLOR-CON M20) 20 mEq tablet, Take 1 Tab by mouth daily. , Disp: 90 Tab, Rfl: 4    buPROPion XL (WELLBUTRIN XL) 300 mg XL tablet, Take 1 Tab by mouth daily. , Disp: 90 Tab, Rfl: 3    Ranolazine 1,000 mg Tb12, Take 1,000 mg by mouth two (2) times a day., Disp: , Rfl:     pantoprazole (PROTONIX) 40 mg tablet, TAKE ONE (1) TABLET(S) ONCE DAILY, Disp: 90 Tab, Rfl: 3    insulin glargine (LANTUS) 100 unit/mL injection, by SubCUTAneous route nightly., Disp: , Rfl:     insulin lispro (HUMALOG) 100 unit/mL injection, by SubCUTAneous route., Disp: , Rfl:     cpap machine kit, by Does Not Apply route. autopap 5-20, Disp: , Rfl:     hydrALAZINE (APRESOLINE) 25 mg tablet, Take 25 mg by mouth three (3) times daily. , Disp: , Rfl:     PROAIR HFA 90 mcg/actuation inhaler, , Disp: , Rfl:     ONE TOUCH ULTRA TEST strip, , Disp: , Rfl:     SPIRIVA WITH HANDIHALER 18 mcg inhalation capsule, , Disp: , Rfl:     losartan (COZAAR) 100 mg tablet, Take 100 mg by mouth daily. , Disp: , Rfl:     NEEDLES, INSULIN DISPOSABLE (BD ULTRA-FINE JOSE PEN NEEDLES), by Does Not Apply route., Disp: , Rfl:     nitroglycerin (NITROSTAT) 0.4 mg SL tablet, by SubLINGual route every five (5) minutes as needed for Chest Pain., Disp: , Rfl:     isosorbide mononitrate ER (IMDUR) 30 mg tablet, Take 120 mg by mouth daily. , Disp: , Rfl:     tamsulosin (FLOMAX) 0.4 mg capsule, Take 0.4 mg by mouth daily. , Disp: , Rfl:     ASPIR-81 81 mg TbEC, take 81 mg by mouth daily.  , Disp: , Rfl:    Date Last Reviewed:  3/13/2017       Number of Personal Factors/Comorbidities that affect the Plan of Care: 3+: HIGH COMPLEXITY   EXAMINATION:   ROM:          WNL except some tightness in calves right greater than left  Strength:          Good except decreased power in left DF. DF - normal power today. TONGUE dyskinesia. Functional Mobility:         Gait/Ambulation:  Pt ambulates without assistive device and self reports only about 50 - 100' (will try a 6 mintue walk test). Pt shows bilateral heel scuff with small steps (decreased hip and core strength). Transfers:  independent        Bed Mobility:  NT  Mental Status:          Alert and oriented x 4. Occ decreased command following and motor processing. Impulsive. Vision:          Can see to drive without correction in the day. Getting glasses for his night vision. Body Structures Involved:  1. Nerves  2. Heart  3. Lungs  4. Bones  5. Joints  6. Muscles  7. Ligaments Body Functions Affected:  1. Mental  2. Sensory/Pain  3. Cardio  4. Respiratory  5. Neuromusculoskeletal  6. Movement Related Activities and Participation Affected:  1. Learning and Applying Knowledge  2. General Tasks and Demands  3. Mobility  4. Self Care  5. Domestic Life  6. Interpersonal Interactions and Relationships  7. Community, Social and [place]   Number of elements that affect the Plan of Care: 4+: HIGH COMPLEXITY   CLINICAL PRESENTATION:   Presentation: Evolving clinical presentation with unstable and unpredictable characteristics: HIGH COMPLEXITY   CLINICAL DECISION MAKING:   Outcome Measure: Tool Used: 6-Minute Walk Test   Score:  Initial: 495 Feet = TBD Meters  3 seated rest breaks  Most Recent: TBD Feet = TBD Meters    Interpretation of Score:    AGE  GENDER  MEAN  SD  NORMAL RANGE (2SD)    61-76  Male (15)   Female (22)  572   538  80   80  1-0   354-722    66-77  Male (14)   Female (22)  527   471  80   69  46-65   [phone]    80-80  Male (8)   Female (15)  748   993  31   09  734-006   222-562        Tool Used: Alexis Balance Scale  Score:  Initial: 41/56 43/56  2/17/17   Interpretation of Score: Each section is scored on a 0-4 scale, 0 representing the patients inability to perform the task and 4 representing independence. The scores of each section are added together for a total score of 56. The higher the patients score, the more independent the patient is. Any score below 45 indicates increased risk for falls. Score 56 55-45 44-34 33-23 22-12 11-1 0   Modifier CH CI CJ CK CL CM CN     ? Mobility - Walking and Moving Around:     - CURRENT STATUS: CJ - 20%-39% impaired, limited or restricted    - GOAL STATUS: CI - 1%-19% impaired, limited or restricted    - D/C STATUS:  ---------------To be determined---------------    Tool Used: Dynamic Gait Index  Score:  Initial: 16/24    Interpretation of Score: Each section is scored on a 0-3 scale, 0 representing the patients inability to perform the task and 3 representing independence. The scores of each section are added together for a total score of 24. Any score below 19 indicates increased risk for falls. Score 24 23-19 18-15 14-10 9-5 4-1 0   Modifier  CI CJ CK CL CM CN     Tool Used: Timed Up and Go (TUG)  Score:  Initial: 12.65 seconds  13.85   2/17/17   Interpretation of Score: The test measures, in seconds, the time taken by an individual to stand up from a standard arm chair (seat height 46 cm [18 in], arm height 65 cm [25.6 in]), walk a distance of 3 meters (118 in, approx 10 ft), turn, walk back to the chair and sit down. If the individual takes longer than 14 seconds to complete TUG, this indicates risk for falls. Score 7 7.5-10.5 11-14 14.5-17.5 18-21 21.5-24.5 25+   Modifier  CI CJ CK CL CM CN     Medical Necessity:   · Skilled intervention continues to be required due to [address]. Reason for Services/Other Comments:  · Patient continues to require skilled intervention due to [address] [address] DEFICIT.    Use of outcome tool(s) and clinical judgement create a POC that gives a: Difficult prediction of patient's progress: HIGH COMPLEXITY      S: \"I can walk all through [place] with no pain. Feeling ok\"       TREATMENT:   (In addition to Assessment/Re-Assessment sessions the following treatments were rendered)  Therapeutic Exercise: ( 55 minutes):  Exercises per above or grid below to assess and improve mobility, strength and balance. Required moderate visual, verbal and tactile cues to promote proper body alignment and promote proper body mechanics. Progressed complexity of movement as indicated. THERAPEUTIC ACTIVITY: (0 minutes): Therapeutic activities per grid below to improve mobility, strength and balance. Required minimal verbal and tactile cues to to perform correctly and safely. .     Date:  3/13/17   Date: Date:   Activity/Exercise      Nu Step Nu step  Arms :11  Seat: 11  Level: 1 (stay on this level but increase time 2* to cardiac precautions)  Pt alternated one arm occasionally. 10 minutes with slight leg pain in left calf. Supine bridge x10 too fast cues to slow down and breathe correctly  x6 with 1/2 orange bolster under hips. x3 BIG bridges     Heel/toe raises- for sensory awareness prep x20     Standing lateral foot raises -  for sensory awareness prep x20     Hip circles Cannot comprehend     Seated ankle pumps -for sensory awareness prep and strengthening  x20B     Standing hip extension  Yellow x 20. Cues to keep knee straight     Standing hip abduction Yellow x20 cues to keep knee straight     Forward lunge step - targets for feet. Large step length 1 x 30 seconds each leg. Stepping forward and back. No hold  x10 each leg     BIG walking  X150. Working on BIG steps to keep from [address]. Single leg stance      Standing forward/back bend      Calf stretch on incline board      [place] walking        Pre-Treatment Symptoms: Rena Harvey. reports no pain prior to therapy. No increase in pain by end of session.    Treatment/Session Assessment:  Rena Harvey. little to no leg pain with activity up to about 10 minutes and what sounds like 500 to 1000' feet. Will keep resistance low and increase time 2* to cardiac blockage. Stronger. Working towards larger steps for better balance. · Post session pain:  No change in pain level  · Compliance with Program/Exercises: Will assess as treatment progresses. · Recommendations/Intent for next treatment session: \"Next visit will focus on progressive high level balance and LE sensorimotor awareness stretches and exercises. \".      PT Patient Time In/Time Out  Time In: 1305  Time Out: [address] [place]

## 2017-03-17 ENCOUNTER — HOSPITAL ENCOUNTER (OUTPATIENT)
Dept: PHYSICAL THERAPY | Age: 77
Discharge: HOME | End: 2017-03-17
Attending: INTERNAL MEDICINE
Payer: COMMERCIAL

## 2017-03-17 PROCEDURE — 97112 NEUROMUSCULAR REEDUCATION: CPT

## 2017-03-17 PROCEDURE — 97110 THERAPEUTIC EXERCISES: CPT

## 2017-03-17 NOTE — PROGRESS NOTES
Ederderrekmichael Montes. : 1940 [facility] [place] [address] [address] [facility]  [phone]   [phone]          OUTPATIENT PHYSICAL THERAPY:Progress Report 3/17/2017    ICD-10: Treatment Diagnosis: Repeated falls (R29.6)  Unsteadiness on feet (R26.81)  Unspecified lack of coordination (R27.9)  Precautions/Allergies:   Pcn [penicillins] Nitroglycerin with pt at all times - CP maybe every 2 weeks with strenuous walking or activity. Fall Risk Score: 10 (? 5 = High Risk)  MD Orders: Outpatient PT referral MEDICAL/REFERRING DIAGNOSIS:  history of falls   DATE OF ONSET: Few months  REFERRING PHYSICIAN: Isidro Jain MD  RETURN PHYSICIAN APPOINTMENT: unknown  DATE OF PROGRESS NOTE:    RECERTIFICATION DATE:    PROGRESS:  Pt has attended 9 physical therapy sessions from 17 to date including initial assessment. Sessions consist of range of motion, therapeutic activity, therapeutic exercise, balance and gait refinement. Pt has shown improvement in static standing balance. Pt has increased Alexis Balance Score by 2 points indicating improved static standing balance and decreased risk for fall. Pt has great difficulty following commands for exercise and testing. Pt states he is a little hard of hearing yet he never asks me to repeat myself. Pt did worse on his TUG but I am not sure he was understanding the commands and it was administered twice. Dynamic Gait Index was about the same. Pt has been compliant with his exercises and would benefit from at least one more month to try to maximize functional and balance ability. INITIAL ASSESSMENT:  Mr. Papa Steen presents with increased instance of falling, maybe 2 per month. Pt reports a fall when he was leaning over and one going down steps without lights on which is more of an accident vs balance loss but does speak to decreased sensory awareness with vision removed.   Pt has multiple medical problems contributing to his deficits such as his diabetes, PAD with pain in legs walking 50 - 100', CAD with ~biweekly chest pain needing Nitroglycerin. 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 Pt show decreased command following and motor processing. Pt's biggest complaint to me are his hands - he states he can be holding something and just drop it without knowing. Pt will benefit from OT consult for this. Pt presents with gait and balance deficit and functional hip weakness. Pt will benefit from PT to maximize ability and safety with mobility    PROBLEM LIST (Impacting functional limitations):  1. Decreased Strength  2. Decreased ADL/Functional Activities  3. Decreased Transfer Abilities  4. Decreased Ambulation Ability/Technique  5. Decreased Balance  6. Increased Pain  7. Decreased Activity Tolerance  8. Increased Fatigue  9. Increased Shortness of Breath  10. Decreased Flexibility/Joint Mobility  11. Decreased Knowledge of Precautions  12. Decreased [place] with Home Exercise Program  13. Decreased Cognition INTERVENTIONS PLANNED:  1. Balance Exercise  2. Gait Training  3. Home Exercise Program (HEP)  4. Neuromuscular Re-education/Strengthening  5. Range of Motion (ROM)  6. Therapeutic Activites  7. Therapeutic Exercise/Strengthening  8. Transfer Training  9. possible orthotic consult   TREATMENT PLAN:  Effective Dates: 1/19/17 TO 4/14/17. Frequency/Duration: 2 times a week as able for this patient. (He rarely knows his work schedule more than a couple of day in advance and can be needed to drive nearly any day of the week) for 12 weeks  GOALS: (Goals have been discussed and agreed upon with patient.)  Short-Term Functional Goals: Time Frame: 4 weeks  1.  Pt will be independent with range of motion, strength and balance home exercise program.  STATUS:  MET  Discharge Goals: Time Frame: 8 weeks  Pt will show increased range of motion and improved posture to allow for improved safety and ability with all functional mobility. STATUS:  MET  Pt will decrease TUG score indicating more normalized gait pattern and decrease risk for falls. STATUS:  NOT MET, CONT 4 WEEKS. Pt will increase Alexis Balance Scale to 48/56 indicating increased balance and decreased risk for falls. STATUS:  NOT MET, CONT 4 WEEKS. Pt will increase Dynamic Gait Index to 19/24 indicating increase gait balance and decreased risk for falls. STATUS:  NOT MET, CONT 4 WEEKS. Pt will be able to ambulate increased community distances with least restrictive assistive device independent and safe as evidenced by increased distance on the 6 minute walk test.  STATUS:  NOT TESTED, CONT 4 WEEKS. Rehabilitation Potential For Stated Goals: Fair              HISTORY:   GOAL:  \"To get my fingers working better. Like to walk better. 2/17/17:  \"I feel like I am walking better except for on Monday and Tuesday. \"  Present Symptoms:    Falling 1 - 2 months - Kind of stoop over and put a tag on the car and got right back up. Sometimes I get dizzy when my sugar is low. Check it twice a day. Going down the sairs and I think I tripped. 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 Can drive 4 - 5 hours one way and then come back. History of Present Injury/Illness (Reason for Referral):    Past Medical History/Comorbidities:   Mr. Pastor Malin  has a past medical history of Abdominal distension (11/5/2013); Acute gout (11/5/2013); Anemia; Anxiety (5/1/2013); Anxiety and depression (5/1/2013); BPH (benign prostatic hyperplasia) (11/5/2013); Bronchitis (11/5/2013); CAD (coronary artery disease) (11/5/2013); CAD (coronary artery disease) (11/5/2013); Carcinoma, lung ([facility] [address] (8/10/2015); Chronic kidney disease; CKD (chronic kidney disease) (11/5/2013);  Conjunctivitis (11/5/2013); DEMENTIA; Depression; Diabetes ([facility] Diabetes mellitus ([facility] (5/1/2013); DJD (degenerative joint disease) (11/5/2013); Fatty liver (11/7/2013); Fatty liver (11/7/2013); Gall stone (11/5/2013); GERD (gastroesophageal reflux disease) (11/5/2013); GERD (gastroesophageal reflux disease) (11/5/2013); Hepatomegaly (11/5/2013); Hepatomegaly (11/5/2013); Hepatomegaly (11/5/2013); History of GI tumor (11/5/2013); Hypercholesterolemia; Hypertension; Hypoglycemia (11/5/2013); Hypoglycemia (11/5/2013); Insomnia (11/5/2013); Noncompliance (11/5/2013); PAD (peripheral artery disease) ([facility] (11/5/2013); Persistent disorder of initiating or maintaining sleep (2/10/2015); Psychiatric disorder; and Urinary frequency (11/5/2013). He also has no past medical history of Heart failure ([facility] or Other ill-defined conditions(799.89). Mr. Criss Ha  has a past surgical history that includes colonoscopy; abdomen surgery proc unlisted; and vascular surgery procedure unlist (Right, 2/3/15). Social History/Living Environment:       Social History     Social History    Marital status:      Spouse name: N/A    Number of children: N/A    Years of education: N/A     Occupational History    Not on file. 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   Up to 2 days pers week 4 - 5 hours stints twice a day    Current Medications:    Current Outpatient Prescriptions:     atorvastatin (LIPITOR) 40 mg tablet, 40 mg., Disp: , Rfl:     amLODIPine (NORVASC) 5 mg tablet, , Disp: , Rfl:     BD INSULIN PEN NEEDLE UF ORIG 29 gauge x 1/2\" ndle, , Disp: , Rfl:     BD INSULIN SYRINGE ULTRA-FINE 1 mL 30 gauge x 1/2\" syrg, , Disp: , Rfl:     carvedilol (COREG) 12.5 mg tablet, , Disp: , Rfl:     clopidogrel (PLAVIX) 75 mg tablet, Take 1 Tab by mouth daily. , Disp: 90 Tab, Rfl: 3    furosemide (LASIX) 40 mg tablet, One daily, Disp: 90 Tab, Rfl: 3    potassium chloride (KLOR-CON M20) 20 mEq tablet, Take 1 Tab by mouth daily. , Disp: 90 Tab, Rfl: 4    buPROPion XL (WELLBUTRIN XL) 300 mg XL tablet, Take 1 Tab by mouth daily. , Disp: 90 Tab, Rfl: 3    Ranolazine 1,000 mg Tb12, Take 1,000 mg by mouth two (2) times a day., Disp: , Rfl:     pantoprazole (PROTONIX) 40 mg tablet, TAKE ONE (1) TABLET(S) ONCE DAILY, Disp: 90 Tab, Rfl: 3    insulin glargine (LANTUS) 100 unit/mL injection, by SubCUTAneous route nightly., Disp: , Rfl:     insulin lispro (HUMALOG) 100 unit/mL injection, by SubCUTAneous route., Disp: , Rfl:     cpap machine kit, by Does Not Apply route. autopap 5-20, Disp: , Rfl:     hydrALAZINE (APRESOLINE) 25 mg tablet, Take 25 mg by mouth three (3) times daily. , Disp: , Rfl:     PROAIR HFA 90 mcg/actuation inhaler, , Disp: , Rfl:     ONE TOUCH ULTRA TEST strip, , Disp: , Rfl:     SPIRIVA WITH HANDIHALER 18 mcg inhalation capsule, , Disp: , Rfl:     losartan (COZAAR) 100 mg tablet, Take 100 mg by mouth daily. , Disp: , Rfl:     NEEDLES, INSULIN DISPOSABLE (BD ULTRA-FINE JOSE PEN NEEDLES), by Does Not Apply route., Disp: , Rfl:     nitroglycerin (NITROSTAT) 0.4 mg SL tablet, by SubLINGual route every five (5) minutes as needed for Chest Pain., Disp: , Rfl:     isosorbide mononitrate ER (IMDUR) 30 mg tablet, Take 120 mg by mouth daily. , Disp: , Rfl:     tamsulosin (FLOMAX) 0.4 mg capsule, Take 0.4 mg by mouth daily. , Disp: , Rfl:     ASPIR-81 81 mg TbEC, take 81 mg by mouth daily.  , Disp: , Rfl:    Date Last Reviewed:  3/17/2017       Number of Personal Factors/Comorbidities that affect the Plan of Care: 3+: HIGH COMPLEXITY   EXAMINATION:   ROM:          WNL except some tightness in calves right greater than left  Strength:          Good except decreased power in left DF. DF - normal power today. TONGUE dyskinesia. Functional Mobility:         Gait/Ambulation:  Pt ambulates without assistive device and self reports only about 50 - 100' (will try a 6 mintue walk test). Pt shows bilateral heel scuff with small steps (decreased hip and core strength). Transfers:  independent        Bed Mobility:  NT  Mental Status:          Alert and oriented x 4. Occ decreased command following and motor processing. Impulsive. Vision:          Can see to drive without correction in the day. Getting glasses for his night vision. Body Structures Involved:  1. Nerves  2. Heart  3. Lungs  4. Bones  5. Joints  6. Muscles  7. Ligaments Body Functions Affected:  1. Mental  2. Sensory/Pain  3. Cardio  4. Respiratory  5. Neuromusculoskeletal  6. Movement Related Activities and Participation Affected:  1. Learning and Applying Knowledge  2. General Tasks and Demands  3. Mobility  4. Self Care  5. Domestic Life  6. Interpersonal Interactions and Relationships  7. Community, Social and [place]   Number of elements that affect the Plan of Care: 4+: HIGH COMPLEXITY   CLINICAL PRESENTATION:   Presentation: Evolving clinical presentation with unstable and unpredictable characteristics: HIGH COMPLEXITY   CLINICAL DECISION MAKING:   Outcome Measure: Tool Used: 6-Minute Walk Test   Score:  Initial: 495 Feet = TBD Meters  3 seated rest breaks  Most Recent: TBD Feet = TBD Meters    Interpretation of Score:    AGE  GENDER  MEAN  SD  NORMAL RANGE (2SD)    61-76  Male (15)   Female (22)  572   538  80   80  1-0   354-722    66-77  Male (14)   Female (22)  527   471  80   69  46-65   [phone]    80-80  Male (8)   Female (15)  369   363  13   99  943-618   222-562        Tool Used: Alexis Balance Scale  Score:  Initial: 41/56 43/56  2/17/17   Interpretation of Score: Each section is scored on a 0-4 scale, 0 representing the patients inability to perform the task and 4 representing independence. The scores of each section are added together for a total score of 56. The higher the patients score, the more independent the patient is. Any score below 45 indicates increased risk for falls. Score 56 55-45 44-34 33-23 22-12 11-1 0   Modifier CH CI CJ CK CL CM CN     ? Mobility - Walking and Moving Around:     - CURRENT STATUS: CJ - 20%-39% impaired, limited or restricted    - GOAL STATUS: CI - 1%-19% impaired, limited or restricted    - D/C STATUS:  ---------------To be determined---------------    Tool Used: Dynamic Gait Index  Score:  Initial: 16/24    Interpretation of Score: Each section is scored on a 0-3 scale, 0 representing the patients inability to perform the task and 3 representing independence. The scores of each section are added together for a total score of 24. Any score below 19 indicates increased risk for falls. Score 24 23-19 18-15 14-10 9-5 4-1 0   Modifier  CI CJ CK CL CM CN     Tool Used: Timed Up and Go (TUG)  Score:  Initial: 12.65 seconds  13.85   2/17/17   Interpretation of Score: The test measures, in seconds, the time taken by an individual to stand up from a standard arm chair (seat height 46 cm [18 in], arm height 65 cm [25.6 in]), walk a distance of 3 meters (118 in, approx 10 ft), turn, walk back to the chair and sit down. If the individual takes longer than 14 seconds to complete TUG, this indicates risk for falls. Score 7 7.5-10.5 11-14 14.5-17.5 18-21 21.5-24.5 25+   Modifier  CI CJ CK CL CM CN     Medical Necessity:   · Skilled intervention continues to be required due to [address]. Reason for Services/Other Comments:  · Patient continues to require skilled intervention due to [address] [address] DEFICIT.    Use of outcome tool(s) and clinical judgement create a POC that gives a: Difficult prediction of patient's progress: HIGH COMPLEXITY      S: \"I can walk so much farther and better since coming here. No muscle pain with the 15mins of bike. Just a little knee pain\"       TREATMENT:   (In addition to Assessment/Re-Assessment sessions the following treatments were rendered)  Therapeutic Exercise: ( 50 minutes):  Exercises per above or grid below to assess and improve mobility, strength and balance. Required moderate visual, verbal and tactile cues to promote proper body alignment and promote proper body mechanics. Progressed complexity of movement as indicated. THERAPEUTIC ACTIVITY: (0 minutes): Therapeutic activities per grid below to improve mobility, strength and balance. Required minimal verbal and tactile cues to to perform correctly and safely. .     Date:  3/13/17   Date:  3/17/17 Date:   Activity/Exercise      vitals  Sats 99%   HR:  87 - 92 overall. More SOB today. Pain: NO    Nu Step Nu step  Arms :11  Seat: 11  Level: 1 (stay on this level but increase time 2* to cardiac precautions)  Pt alternated one arm occasionally. 10 minutes with slight leg pain in left calf. Nu step  Arms :11  Seat: 11  Level: 1 (stay on this level but increase time 2* to cardiac precautions)  Pt alternated one arm occasionally. 15 minutes with slight leg pain in left knee at about 12 minutes but no calf/muscle pain. Supine bridge x10 too fast cues to slow down and breathe correctly  x6 with 1/2 orange bolster under hips. x3 BIG bridges x10 cues to perform high bridge. Long       Heel/toe raises- for sensory awareness prep x20 x20 B cues to raise high on toes    Standing lateral foot raises -  for sensory awareness prep x20 -    Hip circles Cannot comprehend     Heel walking  In bars but not holding. 4 passes with good ability but holding breath in spite of cues. Seated rest for several minutes to catch breath.     Seated ankle pumps -for sensory awareness prep and strengthening  x20B x20 B  Instruction to perform this on his lunch break from driving. Standing hip extension  Yellow x 20. Cues to keep knee straight     Standing hip abduction Yellow x20 cues to keep knee straight     Forward lunge step - targets for feet. Large step length 1 x 30 seconds each leg. Stepping forward and back. No hold  x10 each leg BIG lunge step and stop - no hold. Cues for ant WS for lunge. 4 passes. Cues to slow down and breath more     BIG walking  X150. Working on BIG steps to keep from [address]. Single leg stance  Trying. 3 x 5 - 10 seconds each side in parallel bars. Standing forward/back bend      Calf stretch on incline board  3 x 30 seconds     [place] walking        Pre-Treatment Symptoms: Andrew Bwoling. reports no pain prior to therapy. No increase in pain by end of session. Treatment/Session Assessment:  Andrew Bowling. little to no leg pain with activity up to about 15 minutes and what sounds like 500 to 1000' feet. A little more SOB today. O2 sats good. HR a little high. Stronger. Working towards larger steps for better balance. · Post session pain:  No change in pain level  · Compliance with Program/Exercises: Will assess as treatment progresses. · Recommendations/Intent for next treatment session: \"Next visit will focus on progressive high level balance and LE sensorimotor awareness stretches and exercises. \".      PT Patient Time In/Time Out  Time In: 0910  Time Out: EVA Goodrich

## 2017-03-17 NOTE — PROGRESS NOTES
Renu Stovall. : [facility] [address]  [phone]   [phone]         OUTPATIENT OCCUPATIONAL THERAPY: Daily Note 3/17/2017     ICD-10: Treatment Diagnosis: Other lack of coordination (R27.8)  Repeated falls (R29.6)  Precautions/Allergies:   Pcn [penicillins]   Fall Risk Score: 10 (? 5 = High Risk)  MD Orders: OT evaluate and treat MEDICAL/REFERRING DIAGNOSIS:   history of falls  DATE OF ONSET: 3 months ago   REFERRING PHYSICIAN: Woodrow Hernández MD  RETURN PHYSICIAN APPOINTMENT: TBD     INITIAL ASSESSMENT:  Mr. Rush Luis presents to occupational therapy services with hx of decreased function of B UEs with c/o frequently dropping objects. Pt also has recent hx of frequent falls and is being seen by physical therapy as well. Pt does have some impaired gross and fine motor coordination in B UE, but especially in the L UE. Pt is pleasant and cooperative, but does appear to have some decreased attention with tasks and decreased command following with activities. Pt also has some decreased strength in the L hand compared to the R hand. Pt also has diabetes with hx of some sensory loss in B hands. Pt will benefit from OT services to address stated goals and plan of care. PLAN OF CARE:   PROBLEM LIST:  1. Decreased ADL/Functional Activities  2. Decreased Ambulation Ability/Technique  3. Decreased Balance  4. Decreased [place] with Home Exercise Program  5. Decreased Cognition  6. Impaired coordination INTERVENTIONS PLANNED:  1. Activities of daily living training  2. Adaptive equipment training  3. Balance training  4. Clothing management  5. Cognitive training  6. Neuromuscular re-eduation  7. Theraputic activity  8. Theraputic exercise  9.  Sensory Re-education    TREATMENT PLAN:  Effective Dates: 17 TO 17  Frequency/Duration: 2 times a week for 8 weeks  GOALS: (Goals have been discussed and agreed upon with patient.)  Short-Term Functional Goals: Time Frame:   1. Rena Charter. will complete 15 minutes of coordination activities for B UE with minimal cues to improve coordination for functional activity. 2. Rena Charter. will complete HEP with minimal cues to improve strength/coordination for ADL. [place]. with follow simple multiple step commands with activities with no cues to improve command following/attending to directions for daily activity. 4. Rena Charter. will complete self-feeding tasks including management of drinks and opening packages with modified independence. Discharge Goals: Time Frame:   1. Rena Charter. will complete 30 minutes of B UE coordination exercises/activities with supervision to improve functional use of hands for daily activity. 2. Rena Charter. will be modified independent with HEP to improve strength/coordination of B UEs. 3. Rena [place]. will demonstrate improved coordination in B hands as evidenced by ability to complete 9-hole pegboard in 30 seconds or less to show improved hand function for daily activity. [place]. will report 75% decrease in dropping objects with daily activity to improve hand function/strength for daily activity. Rehabilitation Potential For Stated Goals: Good  Regarding Jeff Adhikari Jr.'s therapy, I certify that the treatment plan above will be carried out by a therapist or under their direction. Thank you for this referral,  Soco Pringle OT                 The information in this section was collected on 2/17/17 (except where otherwise noted). OCCUPATIONAL PROFILE & HISTORY:   History of Present Injury/Illness (Reason for Referral):  Patient reports frequently dropping small objects such as car keys, dropping utensils when he is eating. States that this all began 3 months ago. Pt is currently seeing PT for falls (last fall 3 months ago).  Pt reports his hands don't feel stiff, numb, or weak. Pt is diabetic and is taking two doses of insulin per day. No hx of injury to either UE per patient report. Past Medical History/Comorbidities:   Mr. Tesfaye Loving  has a past medical history of Abdominal distension (11/5/2013); Acute gout (11/5/2013); Anemia; Anxiety (5/1/2013); Anxiety and depression (5/1/2013); BPH (benign prostatic hyperplasia) (11/5/2013); Bronchitis (11/5/2013); CAD (coronary artery disease) (11/5/2013); CAD (coronary artery disease) (11/5/2013); Carcinoma, lung ([facility] [address] (8/10/2015); Chronic kidney disease; CKD (chronic kidney disease) (11/5/2013); Conjunctivitis (11/5/2013); DEMENTIA; Depression; Diabetes ([address] Diabetes mellitus ([address] (5/1/2013); DJD (degenerative joint disease) (11/5/2013); Fatty liver (11/7/2013); Fatty liver (11/7/2013); Gall stone (11/5/2013); GERD (gastroesophageal reflux disease) (11/5/2013); GERD (gastroesophageal reflux disease) (11/5/2013); Hepatomegaly (11/5/2013); Hepatomegaly (11/5/2013); Hepatomegaly (11/5/2013); History of GI tumor (11/5/2013); Hypercholesterolemia; Hypertension; Hypoglycemia (11/5/2013); Hypoglycemia (11/5/2013); Insomnia (11/5/2013); Noncompliance (11/5/2013); PAD (peripheral artery disease) ([address] (11/5/2013); Persistent disorder of initiating or maintaining sleep (2/10/2015); Psychiatric disorder; and Urinary frequency (11/5/2013). He also has no past medical history of Heart failure ([address] or Other ill-defined conditions(799.89). Mr. Tesfaye Loving  has a past surgical history that includes colonoscopy; abdomen surgery proc unlisted; and vascular surgery procedure unlist (Right, 2/3/15). Social History/Living Environment:     Pt lives with wife. Pt has three adult children that live in [place], [place]. No assistive devices. Independent with ADL. Pt has a walker, walkin shower with grab with shower chair.  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 Pt driving all over [place].  Prior Level of Function/Work/Activity:  Independent and working 3 days per week  Personal Factors:          Overall Behavior:  Cooperative, pleasant, decreased command following with activity   Current Medications:    Current Outpatient Prescriptions:     atorvastatin (LIPITOR) 40 mg tablet, 40 mg., Disp: , Rfl:     amLODIPine (NORVASC) 5 mg tablet, , Disp: , Rfl:     BD INSULIN PEN NEEDLE UF ORIG 29 gauge x 1/2\" ndle, , Disp: , Rfl:     BD INSULIN SYRINGE ULTRA-FINE 1 mL 30 gauge x 1/2\" syrg, , Disp: , Rfl:     carvedilol (COREG) 12.5 mg tablet, , Disp: , Rfl:     clopidogrel (PLAVIX) 75 mg tablet, Take 1 Tab by mouth daily. , Disp: 90 Tab, Rfl: 3    furosemide (LASIX) 40 mg tablet, One daily, Disp: 90 Tab, Rfl: 3    potassium chloride (KLOR-CON M20) 20 mEq tablet, Take 1 Tab by mouth daily. , Disp: 90 Tab, Rfl: 4    buPROPion XL (WELLBUTRIN XL) 300 mg XL tablet, Take 1 Tab by mouth daily. , Disp: 90 Tab, Rfl: 3    Ranolazine 1,000 mg Tb12, Take 1,000 mg by mouth two (2) times a day., Disp: , Rfl:     pantoprazole (PROTONIX) 40 mg tablet, TAKE ONE (1) TABLET(S) ONCE DAILY, Disp: 90 Tab, Rfl: 3    insulin glargine (LANTUS) 100 unit/mL injection, by SubCUTAneous route nightly., Disp: , Rfl:     insulin lispro (HUMALOG) 100 unit/mL injection, by SubCUTAneous route., Disp: , Rfl:     cpap machine kit, by Does Not Apply route. autopap 5-20, Disp: , Rfl:     hydrALAZINE (APRESOLINE) 25 mg tablet, Take 25 mg by mouth three (3) times daily. , Disp: , Rfl:     PROAIR HFA 90 mcg/actuation inhaler, , Disp: , Rfl:     ONE TOUCH ULTRA TEST strip, , Disp: , Rfl:     SPIRIVA WITH HANDIHALER 18 mcg inhalation capsule, , Disp: , Rfl:     losartan (COZAAR) 100 mg tablet, Take 100 mg by mouth daily. , Disp: , Rfl:     NEEDLES, INSULIN DISPOSABLE (BD ULTRA-FINE JOSE PEN NEEDLES), by Does Not Apply route., Disp: , Rfl:     nitroglycerin (NITROSTAT) 0.4 mg SL tablet, by SubLINGual route every five (5) minutes as needed for Chest Pain., Disp: , Rfl:     isosorbide mononitrate ER (IMDUR) 30 mg tablet, Take 120 mg by mouth daily. , Disp: , Rfl:     tamsulosin (FLOMAX) 0.4 mg capsule, Take 0.4 mg by mouth daily. , Disp: , Rfl:     ASPIR-81 81 mg TbEC, take 81 mg by mouth daily. , Disp: , Rfl:             Date Last Reviewed:  2/17/17   Complexity Level : Expanded review of therapy/medical records (1-2):  MODERATE COMPLEXITY   ASSESSMENT OF OCCUPATIONAL PERFORMANCE:     ROM/Strength:     Date:  2/17/17 Date:  2/17/17  Date:  2/17/17 Date:  2/17/17    AROM AROM  STRENGTH STRENGTH   Movement RIGHT LEFT  RIGHT LEFT   SHOULDER:        Flexion  WNL WNL  5 5   Abduction  WNL WNL  5 5   ELBOW:        Flexion  WNL WNL  5 5   Extension  WNL WNL  5 5   WRIST:        Wrist extension  WNL WNL  5 5   HAND:        Hand Strength:            82 lbs 67 lbs    THREE JAW SHANNA PINCH    unable unable    LATERAL PINCH     14 lbs  11 lbs      Functional Mobility:         Gait/Ambulation:  No assistive device        Transfers:  Supervision to modified independence   Coordination: Decreased coordination with rapid alternating movements         Coordination: Tool Used: Five Time Finger to Nose  Score:  Initial: R: 5 (s) L: 5 (s) Most Recent: TBD   Interpretation of Score: Dysmetria to the L of nose with L UE        Tool Used: 9-hole pegboard    Score:  Initial: R: 31 seconds (dropped pegs x 2)   L: 68 seconds and assists with R hand 75% of the time Most Recent: TBD   Interpretation of Score:Impaired coordination bilaterally (L>R)     Tool Used:  [employer]  Test  Score:  Initial: R: 28 (s) L: 41 (s) Most Recent: TBD   Interpretation of Score: Impaired bilaterally (L>R)       Sensation: Reports occasional numbness in L thumb         [place] [employer]:  2.83: Only able to identify with L thumb  3.61: Able to identify 100% bilaterally    Balance:          Frequent falls; See PT chart  Activities of Daily Living:           Basic ADLs (From Assessment) Complex ADLs (From Assessment)         Grooming/Bathing/Dressing Activities of Daily Living                                        Physical Skills Involved:  1. Balance  2. Mobility  3. Fine or Gross Motor Coordination  4. Sensation  5. Dexterity Cognitive Skills Affected (resulting in the inability to perform in a timely and safe manner):  1. Attending  2. Understanding  3. Problem Solving  4. Mental Sequencing Psychosocial Skills Affected:  1. Routines and Behaviors   Number of elements that affect the Plan of Care: 5+:  HIGH COMPLEXITY   CLINICAL DECISION MAKING:   Outcome Measure: Tool Used: [address] [facility] [address] [id number] [facility] Daily Activity Outpatient Short Form  Score:  Initial: 40 Most Recent: X (Date: -- )   Interpretation of Tool:  Represents clinically-significant functional categories (i.e., basic and instrumental activities of daily living, fine motor activities). Score 60 59-55 54-47 46-34 32-21 20-16 15   Modifier CH CI CJ CK CL CM CN     ? Carrying, Moving, and Handling Objects:     - CURRENT STATUS: CK - 40%-59% impaired, limited or restricted    - GOAL STATUS: CJ - 20%-39% impaired, limited or restricted    - D/C STATUS:  ---------------To be determined---------------    Medical Necessity:   · Patient demonstrates excellent rehab potential due to higher previous functional level. Reason for Services/Other Comments:  · Patient continues to require skilled intervention due to decreased functional use of B hands with daily activity.   Clinical Decision-Making Assessment:     Use of outcome tool(s) and clinical judgement create a POC that gives a: Questionable prediction of patient's progress: MODERATE COMPLEXITY   TREATMENT:   (In addition to Assessment/Re-Assessment sessions the following treatments were rendered)    Pre-treatment Symptoms/Complaints:  No complaints  Pain: Initial:     0/10 Post Session:  0/10 Therapeutic Activity: (0 minutes):    Date:  3/3/17 Date:  3/10/17 Date:  3/13/17   Activity/Exercise Parameters Parameters Parameters   Sensory Bin Pt completed reaching in sensory bin to find objects with vision occluded. Pt completed with R hand retrieving 7/10 objects and   L hand 8/10 objects      Visual Pegboard Pattern  Pt completed placement of small pegs in pegboard with picture to match design; Pt needed maximal cues for spacing and for completing the pattern. Pt completed placement with hand needing assistance with R hand each time Pt completed with maximal cues for simple pattern                                    Therapeutic Exercise: ( 25 minutes ):  Exercises per grid below to improve mobility, strength and coordination. Required minimal visual, verbal, manual and tactile cues to promote proper body alignment and promote proper body mechanics. Progressed resistance, repetitions and complexity of movement as indicated.    Date:  3/3/17 Date:  3/6/17 Date:  3/10/17 Date:  3/13/17 Date:  3/17/17   Activity/Exercise Parameters Parameters Parameters     Putty Press B hands x for 2 minutes pressing in putty with cone;   1 minute each hand with pencil holding in tripod grasp    Finger flexion with putty x 10 reps with pink putty    Finger Abduction  15 reps with B hands with moderate assistance to help with motor control of digits        Resistance Clips 25 reps black clip with R hand with three jaw hardy (additional time and minimal tactile cues)  25 reps with blue clip with L hand Rotating in the hand x 10 reps and placing to target      UBE  5.5 minutes at 2.0 resistance  5 minutes at 3.0  6 minutes at 2.0 resistance with 3 minutes forwards/3 minutes backwards    Wall Push-ups   15 reps   15 reps with c/o fatigue in the elbows  15 reps with c/o fatigue in the elbows    PNF Strengthening   D2 flexion/extension; 10 reps with 1lb ball with fatigue  D2 flexion/extension x 15 reps with yellow band; moderate visual/tactile cues     Shoulder Flexion      10 reps with yellow band    Shoulder Abduction      10 reps with yellow band    Shoulder Horizontal Abd/add     10 reps with yellow band    Shoulder External Rotation      10 reps and yellow band   Elbow Flexion/extension      15 reps with yellow band        Neuromuscular Re-education: (20 minutes):  Exercise/activities per grid below to improve coordination, kinesthetic sense and proprioception. Required minimal to moderate visual, verbal, manual and tactile cues to promote motor control of bilateral, upper extremity(s).             Date:  3/3/17 Date:  3/6/17 Date:   Date:  3/13/17 Date:  3/17/17   Activity/Exercise Parameters Parameters Parameters     Stacking blocks  Pt required moderate cueing throughout with visual/verbal cues; pt drops blocks 75% of the time and has difficulty steadying hand to stack blocks   1 cm blocks with each hand (holding R UE to prevent assisting L UE)    Turning Cards Additional time with B hands (30 reps each)   20 reps each hand     Finger Taps  Individiual with minimal to moderate tactile cues x 10 reps each digit        Door Knobs 10 reps with 1 lb weighted balls x 2 sets (first set slow and 2nd set increasing speed 10 reps slowed; 10 reps fast       Light Bulbs 10 reps B UE holding 1 lb ball       Punches  2 sets x 10 reps with 1 lb ball with moderate cues to improve speed in L UE 2 sets x 10 reps alternating; 1 set x 10 reps each UE   10 reps to each side; 10 reps cross punches;  10 reps alternating and calling out colors     Scarf toss  10 reps additional time with pt dropping scarf 75% of the time 10 reps to each hand with pt dropping 60% of the time  10 reps to each hand with pt dropping ~40% of the time 10 reps to each hand with pt dropping ~20% of the time   Rolling Dice    10 reps each hand    PNF  10 reps with 1 lb weights       Beans   15 beans per hand with additional time; pt drops ~30% of the time      [employer] 15 reps with B hands      Pegboard Placement   Placement of 25 pegs with R and removal with left with moderate cues; pt attempted placement on with the L but unable due to fatigue in L UE; Pt needs  Constant cues to grasp two simultaneously     Pencil Coordination activities   Up/down; rotation x 20 reps each hand     Chinese Balls   Attempted in B hands but unable to fully rotate in hands; poor mobility in ulnar side of B hands     Ball Toss   20 reps with pt bouncing ball with L hand and catching ball with B hands; drops ~50% of the time  1. 20 reps bouncing with each hand; more difficulty with L vs R  2. Bouncing back and forward towards therapist with L hand x 20 reps      UE Coordination Routine     1. Wrist flexion/extension coordination B UE  2. Forearm rotation B UE  3. Picking fruit -reaching side to side  4. Rapid alternating movement  5. Hamburger patties  6. Finger taps in opposition   10-15 reps each  1. Wrist flexion/extension coordination B UE  2. Forearm rotation B UE  3. Picking fruit -reaching side to side  4. Rapid alternating movement  5. [place] patties  6. Finger taps in opposition   7. Finger flicks  58-81 reps eac   Overhead stick beat    2 sets x 15 reps  15 reps in standing    Putty Balls      Rolling balls with palm and tips of fingers to work on hand control/coordination      Treatment/Session Assessment:    · Response to Treatment:   Patient tolerated treatment well today. Pt did well with scarf toss with better control of UEs vs previous treatment. Pt continues to need moderate cues for completing exercises/activities. Pt provided with handout with extensive HEP for completing UE strengthening and daily activity. Pt given option to have therapy with other therapist over the next few weeks but choose to complete HEP until he an pick back up with same therapist in two weeks. Pt to continue per plan of care. · Compliance with Program/Exercises:  Will assess as treatment progresses. · Recommendations/Intent for next treatment session: \"Next visit will focus on advancements to more challenging activities and reduction in assistance provided\".   Total Treatment Duration:  OT Patient Time In/Time Out  Time In: 1000  Time Out: R Venk[address], OT

## 2017-03-20 ENCOUNTER — HOSPITAL ENCOUNTER (OUTPATIENT)
Dept: PHYSICAL THERAPY | Age: 77
Discharge: HOME | End: 2017-03-20
Attending: INTERNAL MEDICINE
Payer: COMMERCIAL

## 2017-03-20 PROCEDURE — G8984 CARRY CURRENT STATUS: HCPCS

## 2017-03-20 PROCEDURE — G8985 CARRY GOAL STATUS: HCPCS

## 2017-03-20 PROCEDURE — 97530 THERAPEUTIC ACTIVITIES: CPT

## 2017-03-20 PROCEDURE — 97112 NEUROMUSCULAR REEDUCATION: CPT

## 2017-03-20 PROCEDURE — 97110 THERAPEUTIC EXERCISES: CPT

## 2017-03-20 NOTE — PROGRESS NOTES
Mimi Moss. : [facility] [address]  [phone]   [phone]          OUTPATIENT PHYSICAL THERAPY:Daily Note 3/20/2017    ICD-10: Treatment Diagnosis: Repeated falls (R29.6)  Unsteadiness on feet (R26.81)  Unspecified lack of coordination (R27.9)  Precautions/Allergies:   Pcn [penicillins] Nitroglycerin with pt at all times - CP maybe every 2 weeks with strenuous walking or activity. Fall Risk Score: 10 (? 5 = High Risk)  MD Orders: Outpatient PT referral MEDICAL/REFERRING DIAGNOSIS:  history of falls   DATE OF ONSET: Few months  REFERRING PHYSICIAN: Jone Dwyer MD  RETURN PHYSICIAN APPOINTMENT: unknown  DATE OF PROGRESS NOTE:    RECERTIFICATION DATE:    PROGRESS:  Pt has attended 9 physical therapy sessions from 17 to date including initial assessment. Sessions consist of range of motion, therapeutic activity, therapeutic exercise, balance and gait refinement. Pt has shown improvement in static standing balance. Pt has increased Alexis Balance Score by 2 points indicating improved static standing balance and decreased risk for fall. Pt has great difficulty following commands for exercise and testing. Pt states he is a little hard of hearing yet he never asks me to repeat myself. Pt did worse on his TUG but I am not sure he was understanding the commands and it was administered twice. Dynamic Gait Index was about the same. Pt has been compliant with his exercises and would benefit from at least one more month to try to maximize functional and balance ability. INITIAL ASSESSMENT:  Mr. Anh Powell presents with increased instance of falling, maybe 2 per month. Pt reports a fall when he was leaning over and one going down steps without lights on which is more of an accident vs balance loss but does speak to decreased sensory awareness with vision removed.   Pt has multiple medical problems contributing to his deficits such as his diabetes, PAD with pain in legs walking 50 - 100', CAD with ~biweekly chest pain needing Nitroglycerin. 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 Pt show decreased command following and motor processing. Pt's biggest complaint to me are his hands - he states he can be holding something and just drop it without knowing. Pt will benefit from OT consult for this. Pt presents with gait and balance deficit and functional hip weakness. Pt will benefit from PT to maximize ability and safety with mobility    PROBLEM LIST (Impacting functional limitations):  1. Decreased Strength  2. Decreased ADL/Functional Activities  3. Decreased Transfer Abilities  4. Decreased Ambulation Ability/Technique  5. Decreased Balance  6. Increased Pain  7. Decreased Activity Tolerance  8. Increased Fatigue  9. Increased Shortness of Breath  10. Decreased Flexibility/Joint Mobility  11. Decreased Knowledge of Precautions  12. Decreased [place] with Home Exercise Program  13. Decreased Cognition INTERVENTIONS PLANNED:  1. Balance Exercise  2. Gait Training  3. Home Exercise Program (HEP)  4. Neuromuscular Re-education/Strengthening  5. Range of Motion (ROM)  6. Therapeutic Activites  7. Therapeutic Exercise/Strengthening  8. Transfer Training  9. possible orthotic consult   TREATMENT PLAN:  Effective Dates: 1/19/17 TO 4/14/17. Frequency/Duration: 2 times a week as able for this patient. (He rarely knows his work schedule more than a couple of day in advance and can be needed to drive nearly any day of the week) for 12 weeks  GOALS: (Goals have been discussed and agreed upon with patient.)  Short-Term Functional Goals: Time Frame: 4 weeks  1.  Pt will be independent with range of motion, strength and balance home exercise program.  STATUS:  MET  Discharge Goals: Time Frame: 8 weeks  Pt will show increased range of motion and improved posture to allow for improved safety and ability with all functional mobility. STATUS:  MET  Pt will decrease TUG score indicating more normalized gait pattern and decrease risk for falls. STATUS:  NOT MET, CONT 4 WEEKS. Pt will increase Alexis Balance Scale to 48/56 indicating increased balance and decreased risk for falls. STATUS:  NOT MET, CONT 4 WEEKS. Pt will increase Dynamic Gait Index to 19/24 indicating increase gait balance and decreased risk for falls. STATUS:  NOT MET, CONT 4 WEEKS. Pt will be able to ambulate increased community distances with least restrictive assistive device independent and safe as evidenced by increased distance on the 6 minute walk test.  STATUS:  NOT TESTED, CONT 4 WEEKS. Rehabilitation Potential For Stated Goals: Fair              HISTORY:   GOAL:  \"To get my fingers working better. Like to walk better. 2/17/17:  \"I feel like I am walking better except for on Monday and Tuesday. \"  Present Symptoms:    Falling 1 - 2 months - Kind of stoop over and put a tag on the car and got right back up. Sometimes I get dizzy when my sugar is low. Check it twice a day. Going down the sairs and I think I tripped. 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 Can drive 4 - 5 hours one way and then come back. History of Present Injury/Illness (Reason for Referral):    Past Medical History/Comorbidities:   Mr. Papa Steen  has a past medical history of Abdominal distension (11/5/2013); Acute gout (11/5/2013); Anemia; Anxiety (5/1/2013); Anxiety and depression (5/1/2013); BPH (benign prostatic hyperplasia) (11/5/2013); Bronchitis (11/5/2013); CAD (coronary artery disease) (11/5/2013); CAD (coronary artery disease) (11/5/2013); Carcinoma, lung ([facility] (8/10/2015); Chronic kidney disease; CKD (chronic kidney disease) (11/5/2013);  Conjunctivitis (11/5/2013); DEMENTIA; Depression; Diabetes ([facility] Diabetes mellitus ([facility] (5/1/2013); DJD (degenerative joint disease) (11/5/2013); Fatty liver (11/7/2013); Fatty liver (11/7/2013); Gall stone (11/5/2013); GERD (gastroesophageal reflux disease) (11/5/2013); GERD (gastroesophageal reflux disease) (11/5/2013); Hepatomegaly (11/5/2013); Hepatomegaly (11/5/2013); Hepatomegaly (11/5/2013); History of GI tumor (11/5/2013); Hypercholesterolemia; Hypertension; Hypoglycemia (11/5/2013); Hypoglycemia (11/5/2013); Insomnia (11/5/2013); Noncompliance (11/5/2013); PAD (peripheral artery disease) ([facility] (11/5/2013); Persistent disorder of initiating or maintaining sleep (2/10/2015); Psychiatric disorder; and Urinary frequency (11/5/2013). He also has no past medical history of Heart failure ([facility] or Other ill-defined conditions(799.89). Mr. Criss Ha  has a past surgical history that includes colonoscopy; abdomen surgery proc unlisted; and vascular surgery procedure unlist (Right, 2/3/15). Social History/Living Environment:       Social History     Social History    Marital status:      Spouse name: N/A    Number of children: N/A    Years of education: N/A     Occupational History    Not on file. 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   Up to 2 days pers week 4 - 5 hours stints twice a day    Current Medications:    Current Outpatient Prescriptions:     atorvastatin (LIPITOR) 40 mg tablet, 40 mg., Disp: , Rfl:     amLODIPine (NORVASC) 5 mg tablet, , Disp: , Rfl:     BD INSULIN PEN NEEDLE UF ORIG 29 gauge x 1/2\" ndle, , Disp: , Rfl:     BD INSULIN SYRINGE ULTRA-FINE 1 mL 30 gauge x 1/2\" syrg, , Disp: , Rfl:     carvedilol (COREG) 12.5 mg tablet, , Disp: , Rfl:     clopidogrel (PLAVIX) 75 mg tablet, Take 1 Tab by mouth daily. , Disp: 90 Tab, Rfl: 3    furosemide (LASIX) 40 mg tablet, One daily, Disp: 90 Tab, Rfl: 3    potassium chloride (KLOR-CON M20) 20 mEq tablet, Take 1 Tab by mouth daily. , Disp: 90 Tab, Rfl: 4    buPROPion XL (WELLBUTRIN XL) 300 mg XL tablet, Take 1 Tab by mouth daily. , Disp: 90 Tab, Rfl: 3    Ranolazine 1,000 mg Tb12, Take 1,000 mg by mouth two (2) times a day., Disp: , Rfl:     pantoprazole (PROTONIX) 40 mg tablet, TAKE ONE (1) TABLET(S) ONCE DAILY, Disp: 90 Tab, Rfl: 3    insulin glargine (LANTUS) 100 unit/mL injection, by SubCUTAneous route nightly., Disp: , Rfl:     insulin lispro (HUMALOG) 100 unit/mL injection, by SubCUTAneous route., Disp: , Rfl:     cpap machine kit, by Does Not Apply route. autopap 5-20, Disp: , Rfl:     hydrALAZINE (APRESOLINE) 25 mg tablet, Take 25 mg by mouth three (3) times daily. , Disp: , Rfl:     PROAIR HFA 90 mcg/actuation inhaler, , Disp: , Rfl:     ONE TOUCH ULTRA TEST strip, , Disp: , Rfl:     SPIRIVA WITH HANDIHALER 18 mcg inhalation capsule, , Disp: , Rfl:     losartan (COZAAR) 100 mg tablet, Take 100 mg by mouth daily. , Disp: , Rfl:     NEEDLES, INSULIN DISPOSABLE (BD ULTRA-FINE JOSE PEN NEEDLES), by Does Not Apply route., Disp: , Rfl:     nitroglycerin (NITROSTAT) 0.4 mg SL tablet, by SubLINGual route every five (5) minutes as needed for Chest Pain., Disp: , Rfl:     isosorbide mononitrate ER (IMDUR) 30 mg tablet, Take 120 mg by mouth daily. , Disp: , Rfl:     tamsulosin (FLOMAX) 0.4 mg capsule, Take 0.4 mg by mouth daily. , Disp: , Rfl:     ASPIR-81 81 mg TbEC, take 81 mg by mouth daily.  , Disp: , Rfl:    Date Last Reviewed:  3/20/2017       Number of Personal Factors/Comorbidities that affect the Plan of Care: 3+: HIGH COMPLEXITY   EXAMINATION:   ROM:          WNL except some tightness in calves right greater than left  Strength:          Good except decreased power in left DF. DF - normal power today. TONGUE dyskinesia. Functional Mobility:         Gait/Ambulation:  Pt ambulates without assistive device and self reports only about 50 - 100' (will try a 6 mintue walk test). Pt shows bilateral heel scuff with small steps (decreased hip and core strength). Transfers:  independent        Bed Mobility:  NT  Mental Status:          Alert and oriented x 4. Occ decreased command following and motor processing. Impulsive. Vision:          Can see to drive without correction in the day. Getting glasses for his night vision. Body Structures Involved:  1. Nerves  2. Heart  3. Lungs  4. Bones  5. Joints  6. Muscles  7. Ligaments Body Functions Affected:  1. Mental  2. Sensory/Pain  3. Cardio  4. Respiratory  5. Neuromusculoskeletal  6. Movement Related Activities and Participation Affected:  1. Learning and Applying Knowledge  2. General Tasks and Demands  3. Mobility  4. Self Care  5. Domestic Life  6. Interpersonal Interactions and Relationships  7. Community, Social and [place]   Number of elements that affect the Plan of Care: 4+: HIGH COMPLEXITY   CLINICAL PRESENTATION:   Presentation: Evolving clinical presentation with unstable and unpredictable characteristics: HIGH COMPLEXITY   CLINICAL DECISION MAKING:   Outcome Measure: Tool Used: 6-Minute Walk Test   Score:  Initial: 495 Feet = TBD Meters  3 seated rest breaks  Most Recent: TBD Feet = TBD Meters    Interpretation of Score:    AGE  GENDER  MEAN  SD  NORMAL RANGE (2SD)    61-76  Male (15)   Female (22)  572   538  80   80  1-0   [phone]    66-77  Male (14)   Female (22)  527   471  80   69  46-65   [phone]    80-80  Male (8)   Female (15)  499   497  46   83  929-699   222-562        Tool Used: Alexis Balance Scale  Score:  Initial: 41/56 43/56  2/17/17   Interpretation of Score: Each section is scored on a 0-4 scale, 0 representing the patients inability to perform the task and 4 representing independence. The scores of each section are added together for a total score of 56. The higher the patients score, the more independent the patient is. Any score below 45 indicates increased risk for falls. Score 56 55-45 44-34 33-23 22-12 11-1 0   Modifier CH CI CJ CK CL CM CN     ? Mobility - Walking and Moving Around:     - CURRENT STATUS: CJ - 20%-39% impaired, limited or restricted    - GOAL STATUS: CI - 1%-19% impaired, limited or restricted    - D/C STATUS:  ---------------To be determined---------------    Tool Used: Dynamic Gait Index  Score:  Initial: 16/24    Interpretation of Score: Each section is scored on a 0-3 scale, 0 representing the patients inability to perform the task and 3 representing independence. The scores of each section are added together for a total score of 24. Any score below 19 indicates increased risk for falls. Score 24 23-19 18-15 14-10 9-5 4-1 0   Modifier CH CI CJ CK CL CM CN     Tool Used: Timed Up and Go (TUG)  Score:  Initial: 12.65 seconds  13.85   2/17/17   Interpretation of Score: The test measures, in seconds, the time taken by an individual to stand up from a standard arm chair (seat height 46 cm [18 in], arm height 65 cm [25.6 in]), walk a distance of 3 meters (118 in, approx 10 ft), turn, walk back to the chair and sit down. If the individual takes longer than 14 seconds to complete TUG, this indicates risk for falls. Score 7 7.5-10.5 11-14 14.5-17.5 18-21 21.5-24.5 25+   Modifier  CI CJ CK CL CM CN     Medical Necessity:   · Skilled intervention continues to be required due to [facility]. Reason for Services/Other Comments:  · Patient continues to require skilled intervention due to [id number] [address] DEFICIT.    Use of outcome tool(s) and clinical judgement create a POC that gives a: Difficult prediction of patient's progress: HIGH COMPLEXITY      S: \"I can tell I am doing so much better./\"       TREATMENT:   (In addition to Assessment/Re-Assessment sessions the following treatments were rendered)  Therapeutic Exercise: ( 45 minutes):  Exercises per above or grid below to assess and improve mobility, strength and balance. Required moderate visual, verbal and tactile cues to promote proper body alignment and promote proper body mechanics. Progressed complexity of movement as indicated. THERAPEUTIC ACTIVITY: (10 minutes): Therapeutic activities per grid below to improve mobility, strength and balance. Required minimal verbal and tactile cues to to perform correctly and safely. .     Date:  3/13/17   Date:  3/17/17 Date:  3/20/17   Activity/Exercise      vitals  Sats 99%   HR:  87 - 92 overall. More SOB today. Pain: NO Startin%  88 bpm  After Nu Step:  99%   102 to 99 bpm   Nu Step Nu step  Arms :11  Seat: 11  Level: 1 (stay on this level but increase time 2* to cardiac precautions)  Pt alternated one arm occasionally. 10 minutes with slight leg pain in left calf. Nu step  Arms :11  Seat: 11  Level: 1 (stay on this level but increase time 2* to cardiac precautions)  Pt alternated one arm occasionally. 15 minutes with slight leg pain in left knee at about 12 minutes but no calf/muscle pain. Nu step  Arms :11  Seat: 11  Level: 1 (stay on this level but increase time 2* to cardiac precautions)  Pt alternated one arm occasionally. 15 minutes  no calf/muscle pain. Supine hip flexor stretch off the mat   3 x 30 seconds      Supine bridge x10 too fast cues to slow down and breathe correctly  x6 with 1/2 orange bolster under hips. x3 BIG bridges x10 cues to perform high bridge. x10 BIG bridges   Long leg bridge/glut set   x10 with PT guiding the timing.   Pt goes too hard and fast   Heel/toe raises- for sensory awareness prep x20 x20 B cues to raise high on toes Tip toe and heel walking  Tip toe 2 x 10' foot pass in parallel   Standing lateral foot raises -  for sensory awareness prep x20 - -   Hip circles Cannot comprehend     Heel walking  In bars but not holding. 4 passes with good ability but holding breath in spite of cues. Seated rest for several minutes to catch breath. 2 10' passes   Seated ankle pumps -for sensory awareness prep and strengthening  x20B x20 B  Instruction to perform this on his lunch break from driving. x20   Standing hip extension  Yellow x 20. Cues to keep knee straight     Standing hip abduction Yellow x20 cues to keep knee straight     Forward lunge step - targets for feet. Large step length 1 x 30 seconds each leg. Stepping forward and back. No hold  x10 each leg BIG lunge step and stop - no hold. Cues for ant WS for lunge. 4 passes. Cues to slow down and breath more  BIG step and stop in parallel bars. 4 passes in bars with seated rest after 2. Excellent ability 2 - 4 reps   BIG walking  X150. Working on BIG steps to keep from [address]. X150. Cues for big step length   Single leg stance  Trying. 3 x 5 - 10 seconds each side in parallel bars. Standing forward/back bend      Calf stretch on incline board  3 x 30 seconds     [place] walking        Pre-Treatment Symptoms: Cecil Farmer. reports no pain prior to therapy. No increase in pain by end of session. Treatment/Session Assessment:  Cecil Farmer. little to no leg pain with activity up to about 15 minutes and what sounds like 500 to 1000' feet. Better gait overall. About 3 more visits and then discharge. · Post session pain:  No change in pain level  · Compliance with Program/Exercises: Will assess as treatment progresses. · Recommendations/Intent for next treatment session: \"Next visit will focus on progressive high level balance and LE sensorimotor awareness stretches and exercises. \".      PT Patient Time In/Time Out  Time In: 1400  Time Out: Luli 15, PT

## 2017-03-20 NOTE — PROGRESS NOTES
Polly Alvino. : [facility] [address]  [phone]   [phone]         OUTPATIENT OCCUPATIONAL THERAPY: Progress Report 3/20/2017     ICD-10: Treatment Diagnosis: Other lack of coordination (R27.8)  Repeated falls (R29.6)  Precautions/Allergies:   Pcn [penicillins]   Fall Risk Score: 10 (? 5 = High Risk)  MD Orders: OT evaluate and treat MEDICAL/REFERRING DIAGNOSIS:   history of falls  DATE OF ONSET: 3 months ago   REFERRING PHYSICIAN: Jos Dahl MD  RETURN PHYSICIAN APPOINTMENT: TBD     INITIAL ASSESSMENT:  Mr. Bubba Sauceda has been seen by OT services for initial assessment and 5 additional visits. Pt's treatment sessions have been focused on improving UE strength/endurance, coordination of the UEs (especially the L side), and visual/cognitive skills. Pt has difficulty with visual as well as cognitive tasks with decreased ability to follow directions. Pt is very pleasant and able to carry on normal conversation and recall events from previous conversations, but appears to have some difficulty with mental flexibility, attention, and executive functioning. Pt scored 15/30 on Marbin Cognitive Assessment, with 26 or more indicating normal cognition. Pt also noted to have some decreased ability to complete visual scanning and to copy a visual design. Pt has made some progress with some improved hand coordination of the L hand with daily tasks. Pt provided with HEP to complete at home that we will continue to address due to decreased command following with tasks. Pt is making some progress towards goals but will continue to benefit from OT services to address stated goals and plan of care. PLAN OF CARE:   PROBLEM LIST:  1. Decreased ADL/Functional Activities  2. Decreased Ambulation Ability/Technique  3. Decreased Balance  4. Decreased [employer] with Home Exercise Program  5. Decreased Cognition  6.  Impaired coordination INTERVENTIONS PLANNED:  1. Activities of daily living training  2. Adaptive equipment training  3. Balance training  4. Clothing management  5. Cognitive training  6. Neuromuscular re-eduation  7. Theraputic activity  8. Theraputic exercise  9. Sensory Re-education    TREATMENT PLAN:  Effective Dates: 2/17/17 TO 5/17/17  Frequency/Duration: 2 times a week for 8 weeks  GOALS: (Goals have been discussed and agreed upon with patient.)  Short-Term Functional Goals: Time Frame:   1. Wesley Hood. will complete 15 minutes of coordination activities for B UE with minimal cues to improve coordination for functional activity. MET  2. Wesley Hood. will complete HEP with minimal cues to improve strength/coordination for ADL. [address] with follow simple multiple step commands with activities with no cues to improve command following/attending to directions for daily activity. CONTINUE  4. Wesley Hood. will complete self-feeding tasks including management of drinks and opening packages with modified independence. CONTINUE  Discharge Goals: Time Frame:   1. Wesley Hood. will complete 30 minutes of B UE coordination exercises/activities with supervision to improve functional use of hands for daily activity. CONTINUE  2. Wesley Hood. will be modified independent with HEP to improve strength/coordination of B UEs. [address]. will demonstrate improved coordination in B hands as evidenced by ability to complete 9-hole pegboard in 30 seconds or less to show improved hand function for daily activity. PARTIALLY MET  4. Wesley Hood. will report 75% decrease in dropping objects with daily activity to improve hand function/strength for daily activity.  CONTINUE  Rehabilitation Potential For Stated Goals: Good  Regarding Meeta Varela Jr.'s therapy, I certify that the treatment plan above will be carried out by a therapist or under their direction. Thank you for this referral,  Porfirio Tompkins OT                 The information in this section was collected on 2/17/17 (except where otherwise noted). OCCUPATIONAL PROFILE & HISTORY:   History of Present Injury/Illness (Reason for Referral):  Patient reports frequently dropping small objects such as car keys, dropping utensils when he is eating. States that this all began 3 months ago. Pt is currently seeing PT for falls (last fall 3 months ago). Pt reports his hands don't feel stiff, numb, or weak. Pt is diabetic and is taking two doses of insulin per day. No hx of injury to either UE per patient report. Past Medical History/Comorbidities:   Mr. Raimundo Barrientos  has a past medical history of Abdominal distension (11/5/2013); Acute gout (11/5/2013); Anemia; Anxiety (5/1/2013); Anxiety and depression (5/1/2013); BPH (benign prostatic hyperplasia) (11/5/2013); Bronchitis (11/5/2013); CAD (coronary artery disease) (11/5/2013); CAD (coronary artery disease) (11/5/2013); Carcinoma, lung ([facility] [address] (8/10/2015); Chronic kidney disease; CKD (chronic kidney disease) (11/5/2013); Conjunctivitis (11/5/2013); DEMENTIA; Depression; Diabetes ([facility] [address] Diabetes mellitus ([facility] [address] (5/1/2013); DJD (degenerative joint disease) (11/5/2013); Fatty liver (11/7/2013); Fatty liver (11/7/2013); Gall stone (11/5/2013); GERD (gastroesophageal reflux disease) (11/5/2013); GERD (gastroesophageal reflux disease) (11/5/2013); Hepatomegaly (11/5/2013); Hepatomegaly (11/5/2013); Hepatomegaly (11/5/2013); History of GI tumor (11/5/2013); Hypercholesterolemia; Hypertension; Hypoglycemia (11/5/2013); Hypoglycemia (11/5/2013); Insomnia (11/5/2013); Noncompliance (11/5/2013); PAD (peripheral artery disease) ([facility] [address] (11/5/2013); Persistent disorder of initiating or maintaining sleep (2/10/2015); Psychiatric disorder; and Urinary frequency (11/5/2013). He also has no past medical history of Heart failure ([facility] [address] or Other ill-defined conditions(799.89). Mr. Criss Ha  has a past surgical history that includes colonoscopy; abdomen surgery proc unlisted; and vascular surgery procedure unlist (Right, 2/3/15). Social History/Living Environment:     Pt lives with wife. Pt has three adult children that live in [place], [place]. No assistive devices. Independent with ADL. Pt has a walker, walkin shower with grab with shower chair. 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 Pt driving all over [place].  Prior Level of Function/Work/Activity:  Independent and working 3 days per week  Personal Factors:          Overall Behavior:  Cooperative, pleasant, decreased command following with activity   Current Medications:    Current Outpatient Prescriptions:     atorvastatin (LIPITOR) 40 mg tablet, 40 mg., Disp: , Rfl:     amLODIPine (NORVASC) 5 mg tablet, , Disp: , Rfl:     BD INSULIN PEN NEEDLE UF ORIG 29 gauge x 1/2\" ndle, , Disp: , Rfl:     BD INSULIN SYRINGE ULTRA-FINE 1 mL 30 gauge x 1/2\" syrg, , Disp: , Rfl:     carvedilol (COREG) 12.5 mg tablet, , Disp: , Rfl:     clopidogrel (PLAVIX) 75 mg tablet, Take 1 Tab by mouth daily. , Disp: 90 Tab, Rfl: 3    furosemide (LASIX) 40 mg tablet, One daily, Disp: 90 Tab, Rfl: 3    potassium chloride (KLOR-CON M20) 20 mEq tablet, Take 1 Tab by mouth daily. , Disp: 90 Tab, Rfl: 4    buPROPion XL (WELLBUTRIN XL) 300 mg XL tablet, Take 1 Tab by mouth daily. , Disp: 90 Tab, Rfl: 3    Ranolazine 1,000 mg Tb12, Take 1,000 mg by mouth two (2) times a day., Disp: , Rfl:     pantoprazole (PROTONIX) 40 mg tablet, TAKE ONE (1) TABLET(S) ONCE DAILY, Disp: 90 Tab, Rfl: 3    insulin glargine (LANTUS) 100 unit/mL injection, by SubCUTAneous route nightly., Disp: , Rfl:     insulin lispro (HUMALOG) 100 unit/mL injection, by SubCUTAneous route., Disp: , Rfl:     cpap machine kit, by Does Not Apply route.  autopap 5-20, Disp: , Rfl:    hydrALAZINE (APRESOLINE) 25 mg tablet, Take 25 mg by mouth three (3) times daily. , Disp: , Rfl:     PROAIR HFA 90 mcg/actuation inhaler, , Disp: , Rfl:     ONE TOUCH ULTRA TEST strip, , Disp: , Rfl:     SPIRIVA WITH HANDIHALER 18 mcg inhalation capsule, , Disp: , Rfl:     losartan (COZAAR) 100 mg tablet, Take 100 mg by mouth daily. , Disp: , Rfl:     NEEDLES, INSULIN DISPOSABLE (BD ULTRA-FINE JOSE PEN NEEDLES), by Does Not Apply route., Disp: , Rfl:     nitroglycerin (NITROSTAT) 0.4 mg SL tablet, by SubLINGual route every five (5) minutes as needed for Chest Pain., Disp: , Rfl:     isosorbide mononitrate ER (IMDUR) 30 mg tablet, Take 120 mg by mouth daily. , Disp: , Rfl:     tamsulosin (FLOMAX) 0.4 mg capsule, Take 0.4 mg by mouth daily. , Disp: , Rfl:     ASPIR-81 81 mg TbEC, take 81 mg by mouth daily.  , Disp: , Rfl:             Date Last Reviewed:  2/17/17   Complexity Level : Expanded review of therapy/medical records (1-2):  MODERATE COMPLEXITY   ASSESSMENT OF OCCUPATIONAL PERFORMANCE:     ROM/Strength:     Date:  2/17/17 Date:  2/17/17  Date:  2/17/17 Date:  2/17/17 Date:  3/20/17 Date:  3/20/17    AROM AROM  STRENGTH STRENGTH Strength Strength   Movement RIGHT LEFT  RIGHT LEFT Right  Left    SHOULDER:          Flexion  WNL WNL  5 5     Abduction  WNL WNL  5 5     ELBOW:          Flexion  WNL WNL  5 5     Extension  WNL WNL  5 5     WRIST:          Wrist extension  WNL WNL  5 5     HAND:          Hand Strength:              82 lbs 67 lbs  83 lbs  75 lbs   THREE JAW SHANNA PINCH    unable unable      LATERAL PINCH     14 lbs  11 lbs            Marbin Cognitive Assessment (MOCA):    Visuospatial/Executive 0/5   Naming 1/3   Memory -----   Attention  4/6   Language 2/3   Abstraction 1/2   Delayed Re-call 1/5   Orientation 6/6   Total Score:  15/30    *Normal greater than or equal to 26*      Functional Mobility:         Gait/Ambulation:  No assistive device        Transfers:  Supervision to modified independence   Coordination: Decreased coordination with rapid alternating movements         Coordination: Tool Used: Five Time Finger to Nose  Score:  Initial: R: 5 (s) L: 5 (s) Most Recent: TBD   Interpretation of Score: Dysmetria to the L of nose with L UE        Tool Used: 9-hole pegboard    Score:  Initial: R: 31 seconds (dropped pegs x 2)   L: 68 seconds and assists with R hand 75% of the time Most Recent: 16 seconds  32 seconds (no use of R hand to assist)     Interpretation of Score:Impaired coordination bilaterally (L>R)     Tool Used: Breathing Buildings  Test  Score:  Initial: R: 28 (s) L: 41 (s) Most Recent: R: 20 seconds  L: 42  seconds    Interpretation of Score: Impaired bilaterally (L>R)       Sensation: Reports occasional numbness in L thumb         Floris-Parveen Monofilament:  2.83: Only able to identify with L thumb  3.61: Able to identify 100% bilaterally    Balance:          Frequent falls; See PT chart  Activities of Daily Living:           Basic ADLs (From Assessment) Complex ADLs (From Assessment)         Grooming/Bathing/Dressing Activities of Daily Living                                        Physical Skills Involved:  1. Balance  2. Mobility  3. Fine or Gross Motor Coordination  4. Sensation  5. Dexterity Cognitive Skills Affected (resulting in the inability to perform in a timely and safe manner):  1. Attending  2. Understanding  3. Problem Solving  4. Mental Sequencing Psychosocial Skills Affected:  1. Routines and Behaviors   Number of elements that affect the Plan of Care: 5+:  HIGH COMPLEXITY   CLINICAL DECISION MAKING:   Outcome Measure: Tool Used: [address] [facility] [address] [address] AM-[facility] Daily Activity Outpatient Short Form  Score:  Initial: 40 Most Recent: 40 (Date:3 -20-16 )   Interpretation of Tool:  Represents clinically-significant functional categories (i.e., basic and instrumental activities of daily living, fine motor activities).   Score 60 59-55 54-47 46-34 32-21 20-16 15   Modifier CH CI CJ CK CL CM CN     ? Carrying, Moving, and Handling Objects:     - CURRENT STATUS: CK - 40%-59% impaired, limited or restricted    - GOAL STATUS: CJ - 20%-39% impaired, limited or restricted    - D/C STATUS:  CK - 40%-59% impaired, limited or restricted    Medical Necessity:   · Patient demonstrates excellent rehab potential due to higher previous functional level. Reason for Services/Other Comments:  · Patient continues to require skilled intervention due to decreased functional use of B hands with daily activity. Clinical Decision-Making Assessment:     Use of outcome tool(s) and clinical judgement create a POC that gives a: Questionable prediction of patient's progress: MODERATE COMPLEXITY   TREATMENT:   (In addition to Assessment/Re-Assessment sessions the following treatments were rendered)    Pre-treatment Symptoms/Complaints:  No complaints  Pain: Initial:   Pain Intensity 1: 0  Post Session:  0/10     Therapeutic Activity: (30 minutes):    Date:  3/3/17 Date:  3/10/17 Date:  3/13/17 Date:  3/20/17   Activity/Exercise Parameters Parameters Parameters    Sensory Bin Pt completed reaching in sensory bin to find objects with vision occluded. Pt completed with R hand retrieving 7/10 objects and   L hand 8/10 objects       Visual Pegboard Pattern  Pt completed placement of small pegs in pegboard with picture to match design; Pt needed maximal cues for spacing and for completing the pattern.  Pt completed placement with hand needing assistance with R hand each time Pt completed with maximal cues for simple pattern     Bell's Test     Pt able to identify bells to the R and L of page but missed 7 bells in the center   Trail Making Test A    Patient able to completed with additional time and assistance with pt making 5-6 mistakes    [place] Cognitive Assessment     15/30 ; see above in objective                    Therapeutic Exercise: ( 0 minutes ):  Exercises per grid below to improve mobility, strength and coordination. Required minimal visual, verbal, manual and tactile cues to promote proper body alignment and promote proper body mechanics. Progressed resistance, repetitions and complexity of movement as indicated. Date:  3/3/17 Date:  3/6/17 Date:  3/10/17 Date:  3/13/17 Date:  3/17/17   Activity/Exercise Parameters Parameters Parameters     Putty Press B hands x for 2 minutes pressing in putty with cone;   1 minute each hand with pencil holding in tripod grasp    Finger flexion with putty x 10 reps with pink putty    Finger Abduction  15 reps with B hands with moderate assistance to help with motor control of digits        Resistance Clips 25 reps black clip with R hand with three jaw hardy (additional time and minimal tactile cues)  25 reps with blue clip with L hand Rotating in the hand x 10 reps and placing to target      UBE  5.5 minutes at 2.0 resistance  5 minutes at 3.0  6 minutes at 2.0 resistance with 3 minutes forwards/3 minutes backwards    Wall Push-ups   15 reps   15 reps with c/o fatigue in the elbows  15 reps with c/o fatigue in the elbows    PNF Strengthening   D2 flexion/extension; 10 reps with 1lb ball with fatigue  D2 flexion/extension x 15 reps with yellow band; moderate visual/tactile cues     Shoulder Flexion      10 reps with yellow band    Shoulder Abduction      10 reps with yellow band    Shoulder Horizontal Abd/add     10 reps with yellow band    Shoulder External Rotation      10 reps and yellow band   Elbow Flexion/extension      15 reps with yellow band        Neuromuscular Re-education: (8  minutes):  Exercise/activities per grid below to improve coordination, kinesthetic sense and proprioception. Required minimal to moderate visual, verbal, manual and tactile cues to promote motor control of bilateral, upper extremity(s).             Date:  3/3/17 Date:  3/6/17 Date:   Date:  3/13/17 Date:  3/17/17 Date:  3/20/17   Activity/Exercise Parameters Parameters Parameters Stacking blocks  Pt required moderate cueing throughout with visual/verbal cues; pt drops blocks 75% of the time and has difficulty steadying hand to stack blocks   1 cm blocks with each hand (holding R UE to prevent assisting L UE)     Turning Cards Additional time with B hands (30 reps each)   20 reps each hand      Finger Taps  Individiual with minimal to moderate tactile cues x 10 reps each digit         Door Knobs 10 reps with 1 lb weighted balls x 2 sets (first set slow and 2nd set increasing speed 10 reps slowed; 10 reps fast        Light Bulbs 10 reps B UE holding 1 lb ball        Punches  2 sets x 10 reps with 1 lb ball with moderate cues to improve speed in L UE 2 sets x 10 reps alternating; 1 set x 10 reps each UE   10 reps to each side; 10 reps cross punches;  10 reps alternating and calling out colors      Scarf toss  10 reps additional time with pt dropping scarf 75% of the time 10 reps to each hand with pt dropping 60% of the time  10 reps to each hand with pt dropping ~40% of the time 10 reps to each hand with pt dropping ~20% of the time    Rolling Dice    10 reps each hand     PNF  10 reps with 1 lb weights        Beans   15 beans per hand with additional time; pt drops ~30% of the time       Scarf Rolls   15 reps with B hands       Pegboard Placement   Placement of 25 pegs with R and removal with left with moderate cues; pt attempted placement on with the L but unable due to fatigue in L UE; Pt needs  Constant cues to grasp two simultaneously      Pencil Coordination activities   Up/down; rotation x 20 reps each hand      Chinese Balls   Attempted in B hands but unable to fully rotate in hands; poor mobility in ulnar side of B hands      Ball Toss   20 reps with pt bouncing ball with L hand and catching ball with B hands; drops ~50% of the time  1. 20 reps bouncing with each hand; more difficulty with L vs R  2.  Bouncing back and forward towards therapist with L hand x 20 reps       UE Coordination Routine     1. Wrist flexion/extension coordination B UE  2. Forearm rotation B UE  3. Picking fruit -reaching side to side  4. Rapid alternating movement  5. [place]urger patties  6. Finger taps in opposition   10-15 reps each  1. Wrist flexion/extension coordination B UE  2. Forearm rotation B UE  3. Picking fruit -reaching side to side  4. Rapid alternating movement  5. [place] patties  6. Finger taps in opposition   7. Finger flicks  21-81 reps eac    Overhead stick beat    2 sets x 15 reps  15 reps in standing     Putty Balls      Rolling balls with palm and tips of fingers to work on hand control/coordination     9-hole pegboard      1 set each hand with additional time for re-assessment    [place]      1 set each hand with additional time for re-assessment      Treatment/Session Assessment:    · Response to Treatment:   Patient tolerated treatment well today. Pt did well with scarf toss with better control of UEs vs previous treatment. Pt continues to need moderate cues for completing exercises/activities. Pt provided with handout with extensive HEP for completing UE strengthening and daily activity. Pt given option to have therapy with other therapist over the next few weeks but choose to complete HEP until he an pick back up with same therapist in two weeks. Pt to continue per plan of care. · Compliance with Program/Exercises: Will assess as treatment progresses. · Recommendations/Intent for next treatment session: \"Next visit will focus on advancements to more challenging activities and reduction in assistance provided\".   Total Treatment Duration:  OT Patient Time In/Time Out  Time In: 1515  Time Out: 56 Kirti Villanueva OT

## 2017-03-24 ENCOUNTER — HOSPITAL ENCOUNTER (OUTPATIENT)
Dept: PHYSICAL THERAPY | Age: 77
Discharge: HOME | End: 2017-03-24
Attending: INTERNAL MEDICINE
Payer: COMMERCIAL

## 2017-03-24 PROCEDURE — G8979 MOBILITY GOAL STATUS: HCPCS

## 2017-03-24 PROCEDURE — G8980 MOBILITY D/C STATUS: HCPCS

## 2017-03-24 PROCEDURE — 97530 THERAPEUTIC ACTIVITIES: CPT

## 2017-03-24 NOTE — PROGRESS NOTES
Kay Wilson. : 1940 [facility] [address]  [phone]   [phone]          OUTPATIENT PHYSICAL THERAPY:Discharge 3/24/2017    ICD-10: Treatment Diagnosis: Repeated falls (R29.6)  Unsteadiness on feet (R26.81)  Unspecified lack of coordination (R27.9)  Precautions/Allergies:   Pcn [penicillins] Nitroglycerin with pt at all times - CP maybe every 2 weeks with strenuous walking or activity. Fall Risk Score: 10 (? 5 = High Risk)  MD Orders: Outpatient PT referral MEDICAL/REFERRING DIAGNOSIS:  history of falls   DATE OF ONSET: Few months  REFERRING PHYSICIAN: Demetria Talamantes MD  RETURN PHYSICIAN APPOINTMENT: unknown  DATE OF PROGRESS NOTE:  17  RECERTIFICATION DATE: 3/54/52   DISCHARGE:  Pt has attended 16 physical therapy sessions from 17 to date including initial assessment. Sessions consist of range of motion, therapeutic activity, therapeutic exercise, balance and gait refinement. Pt reports no more pain in calves and legs with walking over 500' at a time. Pt complains today of some distal medial ankle pain left greater than right with prolonged walking that he did not report before. Pt has shows slightly fallen arches with increased pronation and pain at the navicular that may benefit from orthotic inserts. We will request orthotic consult. Pt has shown improvement in static standing balance and gait ability. Pt has increased Alexis Balance Score by 6 more points for a total of 8 points indicating improved static standing balance and decreased risk for fall. Pt has shown some improvement with following commands for exercise and testing. Pt stayed about the same on his TUG. Dynamic Gait Index improved by 3 points indicating improved balance and gait ability. Pt's 6 minute walk test increased by 70' indicating increased walking ability and balance.   Pt is discharged with excellent improvement in physical function. INITIAL ASSESSMENT:  Mr. Rita Burks presents with increased instance of falling, maybe 2 per month. Pt reports a fall when he was leaning over and one going down steps without lights on which is more of an accident vs balance loss but does speak to decreased sensory awareness with vision removed. Pt has multiple medical problems contributing to his deficits such as his diabetes, PAD with pain in legs walking 50 - 100', CAD with ~biweekly chest pain needing Nitroglycerin. 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 Pt show decreased command following and motor processing. Pt's biggest complaint to me are his hands - he states he can be holding something and just drop it without knowing. Pt will benefit from OT consult for this. Pt presents with gait and balance deficit and functional hip weakness. Pt will benefit from PT to maximize ability and safety with mobility    PROBLEM LIST (Impacting functional limitations):  1. Decreased Strength  2. Decreased ADL/Functional Activities  3. Decreased Transfer Abilities  4. Decreased Ambulation Ability/Technique  5. Decreased Balance  6. Increased Pain  7. Decreased Activity Tolerance  8. Increased Fatigue  9. Increased Shortness of Breath  10. Decreased Flexibility/Joint Mobility  11. Decreased Knowledge of Precautions  12. Decreased Todd with Home Exercise Program  13. Decreased Cognition INTERVENTIONS PLANNED:  1. Balance Exercise  2. Gait Training  3. Home Exercise Program (HEP)  4. Neuromuscular Re-education/Strengthening  5. Range of Motion (ROM)  6. Therapeutic Activites  7. Therapeutic Exercise/Strengthening  8. Transfer Training  9. possible orthotic consult   TREATMENT PLAN:  Effective Dates: 1/19/17 TO 4/14/17. Frequency/Duration: 2 times a week as able for this patient.   (He rarely knows his work schedule more than a couple of day in advance and can be needed to drive nearly any day of the week) for 12 weeks  GOALS: (Goals have been discussed and agreed upon with patient.)  Short-Term Functional Goals: Time Frame: 4 weeks  1. Pt will be independent with range of motion, strength and balance home exercise program.  STATUS:  MET  Discharge Goals: Time Frame: 8 weeks  Pt will show increased range of motion and improved posture to allow for improved safety and ability with all functional mobility. STATUS:  MET  Pt will decrease TUG score indicating more normalized gait pattern and decrease risk for falls. STATUS:  PATIENT STAYED THE SAME. Pt will increase Alexis Balance Scale to 48/56 indicating increased balance and decreased risk for falls. STATUS:  MET. Pt will increase Dynamic Gait Index to 19/24 indicating increase gait balance and decreased risk for falls. STATUS:  MET. Pt will be able to ambulate increased community distances with least restrictive assistive device independent and safe as evidenced by increased distance on the 6 minute walk test.  STATUS: MET. Rehabilitation Potential For Stated Goals: Fair              HISTORY:   GOAL:  \"To get my fingers working better. Like to walk better. 3/24/17:  \"I think I am doing great compared to the way I was. Walking is 'Oh Lord' better\"  No falls. 2/17/17:  \"I feel like I am walking better except for on Monday and Tuesday. \"  Present Symptoms:    Falling 1 - 2 months - Kind of stoop over and put a tag on the car and got right back up. Sometimes I get dizzy when my sugar is low. Check it twice a day. Going down the sairs and I think I tripped. 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 Can drive 4 - 5 hours one way and then come back. History of Present Injury/Illness (Reason for Referral):    Past Medical History/Comorbidities:   Mr. Sudeep Mancilla  has a past medical history of Abdominal distension (11/5/2013);  Acute gout (11/5/2013); Anemia; Anxiety (5/1/2013); Anxiety and depression (5/1/2013); BPH (benign prostatic hyperplasia) (11/5/2013); Bronchitis (11/5/2013); CAD (coronary artery disease) (11/5/2013); CAD (coronary artery disease) (11/5/2013); Carcin[facility] (8/10/2015); Chronic kidney disease; CKD (chronic kidney disease) (11/5/2013); Conjunctivitis (11/5/2013); DEMENTIA; Depression; Diabetes ([facility] Diabetes mellitus ([facility] (5/1/2013); DJD (degenerative joint disease) (11/5/2013); Fatty liver (11/7/2013); Fatty liver (11/7/2013); Gall stone (11/5/2013); GERD (gastroesophageal reflux disease) (11/5/2013); GERD (gastroesophageal reflux disease) (11/5/2013); Hepatomegaly (11/5/2013); Hepatomegaly (11/5/2013); Hepatomegaly (11/5/2013); History of GI tumor (11/5/2013); Hypercholesterolemia; Hypertension; Hypoglycemia (11/5/2013); Hypoglycemia (11/5/2013); Insomnia (11/5/2013); Noncompliance (11/5/2013); PAD (peripheral artery disease) ([facility] (11/5/2013); Persistent disorder of initiating or maintaining sleep (2/10/2015); Psychiatric disorder; and Urinary frequency (11/5/2013). He also has no past medical history of Heart failure ([facility] or Other ill-defined conditions(799.89). Mr. Aminah Abraham  has a past surgical history that includes colonoscopy; abdomen surgery proc unlisted; and vascular surgery procedure unlist (Right, 2/3/15). Social History/Living Environment:       Social History     Social History    Marital status:      Spouse name: N/A    Number of children: N/A    Years of education: N/A     Occupational History    Not on file.      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 , Disp: 90 Tab, Rfl: 3    furosemide (LASIX) 40 mg tablet, One daily, Disp: 90 Tab, Rfl: 3    potassium chloride (KLOR-CON M20) 20 mEq tablet, Take 1 Tab by mouth daily. , Disp: 90 Tab, Rfl: 4    buPROPion XL (WELLBUTRIN XL) 300 mg XL tablet, Take 1 Tab by mouth daily. , Disp: 90 Tab, Rfl: 3    Ranolazine 1,000 mg Tb12, Take 1,000 mg by mouth two (2) times a day., Disp: , Rfl:     pantoprazole (PROTONIX) 40 mg tablet, TAKE ONE (1) TABLET(S) ONCE DAILY, Disp: 90 Tab, Rfl: 3    insulin glargine (LANTUS) 100 unit/mL injection, by SubCUTAneous route nightly., Disp: , Rfl:     insulin lispro (HUMALOG) 100 unit/mL injection, by SubCUTAneous route., Disp: , Rfl:     cpap machine kit, by Does Not Apply route. autopap 5-20, Disp: , Rfl:     hydrALAZINE (APRESOLINE) 25 mg tablet, Take 25 mg by mouth three (3) times daily. , Disp: , Rfl:     PROAIR HFA 90 mcg/actuation inhaler, , Disp: , Rfl:     ONE TOUCH ULTRA TEST strip, , Disp: , Rfl:     SPIRIVA WITH HANDIHALER 18 mcg inhalation capsule, , Disp: , Rfl:     losartan (COZAAR) 100 mg tablet, Take 100 mg by mouth daily. , Disp: , Rfl:     NEEDLES, INSULIN DISPOSABLE (BD ULTRA-FINE JOSE PEN NEEDLES), by Does Not Apply route., Disp: , Rfl:     nitroglycerin (NITROSTAT) 0.4 mg SL tablet, by SubLINGual route every five (5) minutes as needed for Chest Pain., Disp: , Rfl:     isosorbide mononitrate ER (IMDUR) 30 mg tablet, Take 120 mg by mouth daily. , Disp: , Rfl:     tamsulosin (FLOMAX) 0.4 mg capsule, Take 0.4 mg by mouth daily. , Disp: , Rfl:     ASPIR-81 81 mg TbEC, take 81 mg by mouth daily. , Disp: , Rfl:    Date Last Reviewed:  3/24/2017   Changed Pronix to every other day. =       Number of Personal Factors/Comorbidities that affect the Plan of Care: 3+: HIGH COMPLEXITY   EXAMINATION:   ROM:          WNL except some tightness in calves right greater than left  Strength:          Good except decreased power in left DF. DF - normal power today. TONGUE dyskinesia. Functional Mobility:         Gait/Ambulation:  Pt ambulates without assistive device and self reports only about 50 - 100' (will try a 6 mintue walk test). Pt shows bilateral heel scuff with small steps (decreased hip and core strength). Transfers:  independent        Bed Mobility:  NT  Mental Status:          Alert and oriented x 4. Occ decreased command following and motor processing. Impulsive. Vision:          Can see to drive without correction in the day. Getting glasses for his night vision. Body Structures Involved:  1. Nerves  2. Heart  3. Lungs  4. Bones  5. Joints  6. Muscles  7. Ligaments Body Functions Affected:  1. Mental  2. Sensory/Pain  3. Cardio  4. Respiratory  5. Neuromusculoskeletal  6. Movement Related Activities and Participation Affected:  1. Learning and Applying Knowledge  2. General Tasks and Demands  3. Mobility  4. Self Care  5. Domestic Life  6. Interpersonal Interactions and Relationships  7. Community, Social and [place]   Number of elements that affect the Plan of Care: 4+: HIGH COMPLEXITY   CLINICAL PRESENTATION:   Presentation: Evolving clinical presentation with unstable and unpredictable characteristics: HIGH COMPLEXITY   CLINICAL DECISION MAKING:   Outcome Measure:       Tool Used: 6-Minute Walk Test   Score:  Initial: 495 Feet = 151 Meters  3 seated rest breaks  Most Recent: 565 Feet = 172 Meters with 3 rest breaks for foot pain. Not breathing or calf pain   Interpretation of Score: AGE  GENDER  MEAN  SD  NORMAL RANGE (2SD)    61-76  Male (15)   Female (22)  572   538  80   80  1-0   [phone]    66-77  Male (14)   Female (22)  527   471  80   69  46-65   [phone]    80-80  Male (8)   Female (15)  804   973  81   66  775-600   [phone]        Tool Used: Alexis Balance Scale  Score:  Initial: 41/56 49/56  2/17/17   Interpretation of Score: Each section is scored on a 0-4 scale, 0 representing the patients inability to perform the task and 4 representing independence. The scores of each section are added together for a total score of 56. The higher the patients score, the more independent the patient is. Any score below 45 indicates increased risk for falls. Score 56 55-45 44-34 33-23 22-12 11-1 0   Modifier CH CI CJ CK CL CM CN     ? Mobility - Walking and Moving Around:     - CURRENT STATUS: CI - 1%-19% impaired, limited or restricted    - GOAL STATUS: CI - 1%-19% impaired, limited or restricted    - D/C STATUS:  CI - 1%-19% impaired, limited or restricted    Tool Used: Dynamic Gait Index  Score:  Initial: 16/24 19/24  3/24/17   Interpretation of Score: Each section is scored on a 0-3 scale, 0 representing the patients inability to perform the task and 3 representing independence. The scores of each section are added together for a total score of 24. Any score below 19 indicates increased risk for falls. Score 24 23-19 18-15 14-10 9-5 4-1 0   Modifier CH CI CJ CK CL CM CN     Tool Used: Timed Up and Go (TUG)  Score:  Initial: 12.65 seconds  12.95  3/24/17   Interpretation of Score: The test measures, in seconds, the time taken by an individual to stand up from a standard arm chair (seat height 46 cm [18 in], arm height 65 cm [25.6 in]), walk a distance of 3 meters (118 in, approx 10 ft), turn, walk back to the chair and sit down.   If the individual takes longer than 14 seconds to complete TUG, this indicates risk for falls. Score 7 7.5-10.5 11-14 14.5-17.5 18-21 21.5-24.5 25+   Modifier CH CI CJ CK CL CM CN     Medical Necessity:   · Skilled intervention continues to be required due to [address]. Reason for Services/Other Comments:  · Patient continues to require skilled intervention due to [id number] [address] DEFICIT. Use of outcome tool(s) and clinical judgement create a POC that gives a: Difficult prediction of patient's progress: HIGH COMPLEXITY      S: \"I can tell I am doing so much better./\"       TREATMENT:   (In addition to Assessment/Re-Assessment sessions the following treatments were rendered)  Therapeutic Exercise: ( 0 minutes):  Exercises per above or grid below to assess and improve mobility, strength and balance. Required moderate visual, verbal and tactile cues to promote proper body alignment and promote proper body mechanics. Progressed complexity of movement as indicated. THERAPEUTIC ACTIVITY: (55 minutes): Therapeutic activities per above and  grid below to assess and improve mobility, strength and balance. Required minimal verbal and tactile cues to to perform correctly and safely. Date:  3/13/17   Date:  3/17/17 Date:  3/20/17   Activity/Exercise      vitals  Sats 99%   HR:  87 - 92 overall. More SOB today. Pain: NO Startin%  88 bpm  After Nu Step:  99%   102 to 99 bpm   Nu Step Nu step  Arms :11  Seat: 11  Level: 1 (stay on this level but increase time 2* to cardiac precautions)  Pt alternated one arm occasionally. 10 minutes with slight leg pain in left calf. Nu step  Arms :11  Seat: 11  Level: 1 (stay on this level but increase time 2* to cardiac precautions)  Pt alternated one arm occasionally. 15 minutes with slight leg pain in left knee at about 12 minutes but no calf/muscle pain.  Nu step  Arms :11  Seat: 11  Level: 1 (stay on this level but increase time 2* to cardiac precautions)  Pt alternated one arm occasionally. 15 minutes  no calf/muscle pain. Supine hip flexor stretch off the mat   3 x 30 seconds      Supine bridge x10 too fast cues to slow down and breathe correctly  x6 with 1/2 orange bolster under hips. x3 BIG bridges x10 cues to perform high bridge. x10 BIG bridges   Long leg bridge/glut set   x10 with PT guiding the timing. Pt goes too hard and fast   Heel/toe raises- for sensory awareness prep x20 x20 B cues to raise high on toes Tip toe and heel walking  Tip toe 2 x 10' foot pass in parallel   Standing lateral foot raises -  for sensory awareness prep x20 - -   Hip circles Cannot comprehend     Heel walking  In bars but not holding. 4 passes with good ability but holding breath in spite of cues. Seated rest for several minutes to catch breath. 2 10' passes   Seated ankle pumps -for sensory awareness prep and strengthening  x20B x20 B  Instruction to perform this on his lunch break from driving. x20   Standing hip extension  Yellow x 20. Cues to keep knee straight     Standing hip abduction Yellow x20 cues to keep knee straight     Forward lunge step - targets for feet. Large step length 1 x 30 seconds each leg. Stepping forward and back. No hold  x10 each leg BIG lunge step and stop - no hold. Cues for ant WS for lunge. 4 passes. Cues to slow down and breath more  BIG step and stop in parallel bars. 4 passes in bars with seated rest after 2. Excellent ability 2 - 4 reps   BIG walking  X150. Working on BIG steps to keep from [address]. X150. Cues for big step length   Single leg stance  Trying. 3 x 5 - 10 seconds each side in parallel bars. Standing forward/back bend      Calf stretch on incline board  3 x 30 seconds     [place] walking        Pre-Treatment Symptoms: Jaguar Gutierrez. reports no pain prior to therapy. No increase in pain by end of session. Treatment/Session Assessment:  See discharge assessment.  Patients response to todays treatment session was tolerated well with no medical complications. · Post session pain:  No change in pain level  · Compliance with Program/Exercises: Will assess as treatment progresses. · Recommendations/Intent for next treatment session: \"Next visit will focus on progressive high level balance and LE sensorimotor awareness stretches and exercises. \".      PT Patient Time In/Time Out  Time In: 1300  Time Out: [address], PT

## 2017-03-27 ENCOUNTER — APPOINTMENT (OUTPATIENT)
Dept: PHYSICAL THERAPY | Age: 77
End: 2017-03-27
Attending: INTERNAL MEDICINE
Payer: COMMERCIAL

## 2017-03-31 ENCOUNTER — APPOINTMENT (OUTPATIENT)
Dept: PHYSICAL THERAPY | Age: 77
End: 2017-03-31
Attending: INTERNAL MEDICINE
Payer: COMMERCIAL

## 2017-06-05 NOTE — PROGRESS NOTES
Paty Colon. : [facility] [address]  [phone]   [phone]         OUTPATIENT OCCUPATIONAL THERAPY: Discharge 2017     ICD-10: Treatment Diagnosis: Other lack of coordination (R27.8)  Repeated falls (R29.6)  Precautions/Allergies:   Pcn [penicillins]   Fall Risk Score: 10 (? 5 = High Risk)  MD Orders: OT evaluate and treat MEDICAL/REFERRING DIAGNOSIS:   history of falls  DATE OF ONSET: 3 months ago   REFERRING PHYSICIAN: Bethany Schumacher MD  RETURN PHYSICIAN APPOINTMENT: TBD     INITIAL ASSESSMENT:  Mr. Arpan Ames was seen in OT from 17 to 3/20/17 for a total of 7 visits to address upper extremity strength/coordination and cognitive/visual skills. Pt was making some progress towards goals. Pt returned back to MD and was released back to work. See most recent progress note from 3/20/17. Pt to be discharged from OT services at this time. Thank you for the opportunity to work with Paty Colon.! PLAN OF CARE:   PROBLEM LIST:  1. Decreased ADL/Functional Activities  2. Decreased Ambulation Ability/Technique  3. Decreased Balance  4. Decreased [place] with Home Exercise Program  5. Decreased Cognition  6. Impaired coordination INTERVENTIONS PLANNED:  1. Activities of daily living training  2. Adaptive equipment training  3. Balance training  4. Clothing management  5. Cognitive training  6. Neuromuscular re-eduation  7. Theraputic activity  8. Theraputic exercise  9. Sensory Re-education    TREATMENT PLAN:  Effective Dates: 17 TO 17  Frequency/Duration: 2 times a week for 8 weeks  GOALS: (Goals have been discussed and agreed upon with patient.)  Short-Term Functional Goals: Time Frame:   1. Paty Schmid will complete 15 minutes of coordination activities for B UE with minimal cues to improve coordination for functional activity. MET  2.  Paty Schmid will complete HEP with minimal cues to improve strength/coordination for ADL. NOT MET  3. Arianne Sanchez. with follow simple multiple step commands with activities with no cues to improve command following/attending to directions for daily activity. NOT MET  4. Arianne Sanchez. will complete self-feeding tasks including management of drinks and opening packages with modified independence. MET  Discharge Goals: Time Frame:   1. Arianne Sanchez. will complete 30 minutes of B UE coordination exercises/activities with supervision to improve functional use of hands for daily activity. NOT MET  2. Arianne Sanchez. will be modified independent with HEP to improve strength/coordination of B UEs. NOT MET  3. Arianne Sanchez. will demonstrate improved coordination in B hands as evidenced by ability to complete 9-hole pegboard in 30 seconds or less to show improved hand function for daily activity. PARTIALLY MET  4. Arianne Sanchez. will report 75% decrease in dropping objects with daily activity to improve hand function/strength for daily activity. UNKNOWN  Rehabilitation Potential For Stated Goals: Good  Regarding Savanah Castorena Jr.'s therapy, I certify that the treatment plan above will be carried out by a therapist or under their direction. Thank you for this referral,  Porfirio Tompkins OT                 The information in this section was collected on 2/17/17 (except where otherwise noted). OCCUPATIONAL PROFILE & HISTORY:   History of Present Injury/Illness (Reason for Referral):  Patient reports frequently dropping small objects such as car keys, dropping utensils when he is eating. States that this all began 3 months ago. Pt is currently seeing PT for falls (last fall 3 months ago). Pt reports his hands don't feel stiff, numb, or weak. Pt is diabetic and is taking two doses of insulin per day. No hx of injury to either UE per patient report.    Past Medical History/Comorbidities:   Mr. Raimundo Barrientos  has a past medical history of Abdominal distension (11/5/2013); Acute gout (11/5/2013); Anemia; Anxiety (5/1/2013); Anxiety and depression (5/1/2013); BPH (benign prostatic hyperplasia) (11/5/2013); Bronchitis (11/5/2013); CAD (coronary artery disease) (11/5/2013); CAD (coronary artery disease) (11/5/2013); Carcinoma, lung ([facility] (8/10/2015); Chronic kidney disease; CKD (chronic kidney disease) (11/5/2013); Conjunctivitis (11/5/2013); DEMENTIA; Depression; Diabetes ([facility] Diabetes mellitus ([facility] (5/1/2013); DJD (degenerative joint disease) (11/5/2013); Fatty liver (11/7/2013); Fatty liver (11/7/2013); Gall stone (11/5/2013); GERD (gastroesophageal reflux disease) (11/5/2013); GERD (gastroesophageal reflux disease) (11/5/2013); Hepatomegaly (11/5/2013); Hepatomegaly (11/5/2013); Hepatomegaly (11/5/2013); History of GI tumor (11/5/2013); Hypercholesterolemia; Hypertension; Hypoglycemia (11/5/2013); Hypoglycemia (11/5/2013); Insomnia (11/5/2013); Noncompliance (11/5/2013); PAD (peripheral artery disease) ([facility] (11/5/2013); Persistent disorder of initiating or maintaining sleep (2/10/2015); Psychiatric disorder; and Urinary frequency (11/5/2013). He also has no past medical history of Heart failure ([facility] or Other ill-defined conditions(799.89). Mr. Papa Steen  has a past surgical history that includes colonoscopy; abdomen surgery proc unlisted; and vascular surgery procedure unlist (Right, 2/3/15). Social History/Living Environment:     Pt lives with wife. Pt has three adult children that live in [place], [place]. No assistive devices. Independent with ADL. Pt has a walker, walkin shower with grab with shower chair. 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  Pt driving all over [facility].  Prior Level of Function/Work/Activity:  Independent and working 3 days per week  Personal Factors:          Overall Behavior:  Cooperative, pleasant, decreased command following with activity   Current Medications:    Current Outpatient Prescriptions:     atorvastatin (LIPITOR) 40 mg tablet, 40 mg., Disp: , Rfl:     amLODIPine (NORVASC) 5 mg tablet, , Disp: , Rfl:     BD INSULIN PEN NEEDLE UF ORIG 29 gauge x 1/2\" ndle, , Disp: , Rfl:     BD INSULIN SYRINGE ULTRA-FINE 1 mL 30 gauge x 1/2\" syrg, , Disp: , Rfl:     carvedilol (COREG) 12.5 mg tablet, , Disp: , Rfl:     clopidogrel (PLAVIX) 75 mg tablet, Take 1 Tab by mouth daily. , Disp: 90 Tab, Rfl: 3    furosemide (LASIX) 40 mg tablet, One daily, Disp: 90 Tab, Rfl: 3    potassium chloride (KLOR-CON M20) 20 mEq tablet, Take 1 Tab by mouth daily. , Disp: 90 Tab, Rfl: 4    buPROPion XL (WELLBUTRIN XL) 300 mg XL tablet, Take 1 Tab by mouth daily. , Disp: 90 Tab, Rfl: 3    Ranolazine 1,000 mg Tb12, Take 1,000 mg by mouth two (2) times a day., Disp: , Rfl:     pantoprazole (PROTONIX) 40 mg tablet, TAKE ONE (1) TABLET(S) ONCE DAILY, Disp: 90 Tab, Rfl: 3    insulin glargine (LANTUS) 100 unit/mL injection, by SubCUTAneous route nightly., Disp: , Rfl:     insulin lispro (HUMALOG) 100 unit/mL injection, by SubCUTAneous route., Disp: , Rfl:     cpap machine kit, by Does Not Apply route. autopap 5-20, Disp: , Rfl:     hydrALAZINE (APRESOLINE) 25 mg tablet, Take 25 mg by mouth three (3) times daily. , Disp: , Rfl:     PROAIR HFA 90 mcg/actuation inhaler, , Disp: , Rfl:     ONE TOUCH ULTRA TEST strip, , Disp: , Rfl:     SPIRIVA WITH HANDIHALER 18 mcg inhalation capsule, , Disp: , Rfl:     losartan (COZAAR) 100 mg tablet, Take 100 mg by mouth daily. , Disp: , Rfl:     NEEDLES, INSULIN DISPOSABLE (BD ULTRA-FINE JOSE PEN NEEDLES), by Does Not Apply route., Disp: , Rfl:     nitroglycerin (NITROSTAT) 0.4 mg SL tablet, by SubLINGual route every five (5) minutes as needed for Chest Pain., Disp: , Rfl:     isosorbide mononitrate ER (IMDUR) 30 mg tablet, Take 120 mg by mouth daily. , Disp: , Rfl:     tamsulosin (FLOMAX) 0.4 mg capsule, Take 0.4 mg by mouth daily. , Disp: , Rfl:     ASPIR-81 81 mg TbEC, take 81 mg by mouth daily. , Disp: , Rfl:             Date Last Reviewed:  2/17/17   Complexity Level : Expanded review of therapy/medical records (1-2):  MODERATE COMPLEXITY   ASSESSMENT OF OCCUPATIONAL PERFORMANCE:     ROM/Strength:     Date:  2/17/17 Date:  2/17/17  Date:  2/17/17 Date:  2/17/17 Date:  3/20/17 Date:  3/20/17    AROM AROM  STRENGTH STRENGTH Strength Strength   Movement RIGHT LEFT  RIGHT LEFT Right  Left    SHOULDER:          Flexion  WNL WNL  5 5     Abduction  WNL WNL  5 5     ELBOW:          Flexion  WNL WNL  5 5     Extension  WNL WNL  5 5     WRIST:          Wrist extension  WNL WNL  5 5     HAND:          Hand Strength:              82 lbs 67 lbs  83 lbs  75 lbs   THREE JAW SHANNA PINCH    unable unable      LATERAL PINCH     14 lbs  11 lbs            Marbin Cognitive Assessment (MOCA):    Visuospatial/Executive 0/5   Naming 1/3   Memory -----   Attention  4/6   Language 2/3   Abstraction 1/2   Delayed Re-call 1/5   Orientation 6/6   Total Score:  15/30    *Normal greater than or equal to 26*      Functional Mobility:         Gait/Ambulation:  No assistive device        Transfers:  Supervision to modified independence   Coordination: Decreased coordination with rapid alternating movements         Coordination: Tool Used: Five Time Finger to Nose  Score:  Initial: R: 5 (s) L: 5 (s) Most Recent: TBD   Interpretation of Score: Dysmetria to the L of nose with L UE        Tool Used: 9-hole pegboard    Score:  Initial: R: 31 seconds (dropped pegs x 2)   L: 68 seconds and assists with R hand 75% of the time Most Recent: 16 seconds  32 seconds (no use of R hand to assist)     Interpretation of Score:Impaired coordination bilaterally (L>R)     Tool Used:  [facility]  Test  Score:  Initial: R: 28 (s) L: 41 (s) Most Recent: R: 20 seconds  L: 42  seconds    Interpretation of Score: Impaired bilaterally (L>R)       Sensation: Reports occasional numbness in L thumb         [place] Monofilament:  2.83: Only able to identify with L thumb  3.61: Able to identify 100% bilaterally    Balance:          Frequent falls; See PT chart  Activities of Daily Living:           Basic ADLs (From Assessment) Complex ADLs (From Assessment)         Grooming/Bathing/Dressing Activities of Daily Living                                        Physical Skills Involved:  1. Balance  2. Mobility  3. Fine or Gross Motor Coordination  4. Sensation  5. Dexterity Cognitive Skills Affected (resulting in the inability to perform in a timely and safe manner):  1. Attending  2. Understanding  3. Problem Solving  4. Mental Sequencing Psychosocial Skills Affected:  1. Routines and Behaviors   Number of elements that affect the Plan of Care: 5+:  HIGH COMPLEXITY   CLINICAL DECISION MAKING:   Outcome Measure: Tool Used: [address] [facility] [address] [address] [facility] Daily Activity Outpatient Short Form  Score:  Initial: 40 Most Recent: 40 (Date:3 -20-16 )   Interpretation of Tool:  Represents clinically-significant functional categories (i.e., basic and instrumental activities of daily living, fine motor activities). Score 60 59-55 54-47 46-34 32-21 20-16 15   Modifier CH CI CJ CK CL CM CN     ? Carrying, Moving, and Handling Objects:     - CURRENT STATUS: CK - 40%-59% impaired, limited or restricted    - GOAL STATUS: CJ - 20%-39% impaired, limited or restricted    - D/C STATUS:  CK - 40%-59% impaired, limited or restricted    Medical Necessity:   · Patient demonstrates excellent rehab potential due to higher previous functional level. Reason for Services/Other Comments:  · Patient continues to require skilled intervention due to decreased functional use of B hands with daily activity.   Clinical Decision-Making Assessment:     Use of outcome tool(s) and clinical judgement create a POC that gives a: Questionable prediction of patient's progress: MODERATE COMPLEXITY   TREATMENT:   (In addition to Assessment/Re-Assessment sessions the following treatments were rendered)    Pre-treatment Symptoms/Complaints:  No complaints  Pain: Initial:   Pain Intensity 1: 0  Post Session:  0/10              Treatment/Session Assessment:    · Response to Treatment:   See above. · Compliance with Program/Exercises: Will assess as treatment progresses. · Recommendations/Intent for next treatment session: \"Next visit will focus on advancements to more challenging activities and reduction in assistance provided\".   Total Treatment Duration:  OT Patient Time In/Time Out  Time In: 1515  Time Out: 56 Kirti Villanueva OT

## 2018-04-27 ENCOUNTER — HOSPITAL ENCOUNTER (OUTPATIENT)
Dept: SURGERY | Age: 78
Discharge: HOME | End: 2018-04-27
Payer: COMMERCIAL

## 2018-04-27 VITALS
DIASTOLIC BLOOD PRESSURE: 68 MMHG | WEIGHT: 201.6 LBS | RESPIRATION RATE: 16 BRPM | HEIGHT: 69 IN | OXYGEN SATURATION: 98 % | BODY MASS INDEX: 29.86 KG/M2 | TEMPERATURE: 97.5 F | HEART RATE: 78 BPM | SYSTOLIC BLOOD PRESSURE: 159 MMHG

## 2018-04-27 LAB — GLUCOSE BLD STRIP.AUTO-MCNC: 131 MG/DL (ref 65–100)

## 2018-04-27 PROCEDURE — 82962 GLUCOSE BLOOD TEST: CPT

## 2018-04-27 NOTE — PERIOP NOTES
Patient verified name, , and surgery as listed in [facility]. Patient provided medical/health information and PTA medications to the best of their ability. TYPE  CASE:2  Orders per surgeon: Received and dated 2018  Labs per surgeon:cbc,bmp dated 18,. Results:in [facility]. No repeat required per [facility]. Labs per anesthesia  glucose 131  EKG  : not required per protocol; EKG requested from [facility] 2017 to have for reference if needed. Patient provided with and instructed on education handouts including Guide to Surgery, blood transfusions, pain management, and hand hygiene for the family and community, and [facility] brochure. Hibiclens and antibacterial soap and instructions given per hospital policy. Instructed patient to continue previous medications as prescribed prior to surgery unless otherwise directed and to take the following medications the day of surgery according to anesthesia guidelines : asa,plavix,wellbutrin,coreg,proscar,imdur,protonix,ranexa,flomax,spriva inhaler NTG if needed . Instructed pt to take only 16 units of lantus insulin the night before and the morning of surgery. Instructed patient to hold  the following medications: al lvitamins and supplements. 7 days prior to surgery and nsaids  5 days prior to surgery    Original medication prescription bottles visualized during patient appointment. Patient teach back successful and patient demonstrates knowledge of instruction.

## 2018-04-30 NOTE — PERIOP NOTES
Placed following records on chart:  ekg and cardiac clearance/office visit note dated 4/27/18 from cardiologist, Dr. Savita Alexandre.

## 2018-05-03 ENCOUNTER — HOSPITAL ENCOUNTER (OUTPATIENT)
Dept: CT IMAGING | Age: 78
Discharge: HOME | End: 2018-05-03
Attending: SURGERY
Payer: COMMERCIAL

## 2018-05-03 ENCOUNTER — ANESTHESIA EVENT (OUTPATIENT)
Dept: SURGERY | Age: 78
End: 2018-05-03
Payer: COMMERCIAL

## 2018-05-03 DIAGNOSIS — I73.9 PVD (PERIPHERAL VASCULAR DISEASE) WITH CLAUDICATION (HCC): ICD-10-CM

## 2018-05-03 PROCEDURE — 74011000258 HC RX REV CODE- 258: Performed by: SURGERY

## 2018-05-03 PROCEDURE — 75635 CT ANGIO ABDOMINAL ARTERIES: CPT

## 2018-05-03 PROCEDURE — 74011636320 HC RX REV CODE- 636/320: Performed by: SURGERY

## 2018-05-03 RX ORDER — SODIUM CHLORIDE 0.9 % (FLUSH) 0.9 %
10 SYRINGE (ML) INJECTION
Status: COMPLETED | OUTPATIENT
Start: 2018-05-03 | End: 2018-05-03

## 2018-05-03 RX ADMIN — Medication 10 ML: at 10:33

## 2018-05-03 RX ADMIN — IOPAMIDOL 125 ML: 755 INJECTION, SOLUTION INTRAVENOUS at 10:33

## 2018-05-03 RX ADMIN — SODIUM CHLORIDE 100 ML: 900 INJECTION, SOLUTION INTRAVENOUS at 10:33

## 2018-05-04 ENCOUNTER — ANESTHESIA (OUTPATIENT)
Dept: SURGERY | Age: 78
End: 2018-05-04
Payer: COMMERCIAL

## 2018-05-04 ENCOUNTER — HOSPITAL ENCOUNTER (OUTPATIENT)
Age: 78
Setting detail: OUTPATIENT SURGERY
Discharge: HOME | End: 2018-05-04
Attending: SURGERY | Admitting: SURGERY
Payer: COMMERCIAL

## 2018-05-04 ENCOUNTER — APPOINTMENT (OUTPATIENT)
Dept: INTERVENTIONAL RADIOLOGY/VASCULAR | Age: 78
End: 2018-05-04
Attending: SURGERY
Payer: COMMERCIAL

## 2018-05-04 VITALS
SYSTOLIC BLOOD PRESSURE: 183 MMHG | OXYGEN SATURATION: 100 % | HEART RATE: 68 BPM | TEMPERATURE: 98 F | WEIGHT: 194.31 LBS | DIASTOLIC BLOOD PRESSURE: 82 MMHG | BODY MASS INDEX: 28.78 KG/M2 | RESPIRATION RATE: 16 BRPM | HEIGHT: 69 IN

## 2018-05-04 LAB
ACT BLD: 109 SECS (ref 70–128)
GLUCOSE BLD STRIP.AUTO-MCNC: 80 MG/DL (ref 65–100)
GLUCOSE BLD STRIP.AUTO-MCNC: 93 MG/DL (ref 65–100)
POTASSIUM BLD-SCNC: 5.3 MMOL/L (ref 3.5–5.1)

## 2018-05-04 PROCEDURE — C1725 CATH, TRANSLUMIN NON-LASER: HCPCS

## 2018-05-04 PROCEDURE — 74011250636 HC RX REV CODE- 250/636

## 2018-05-04 PROCEDURE — 76210000000 HC OR PH I REC 2 TO 2.5 HR: Performed by: SURGERY

## 2018-05-04 PROCEDURE — C1769 GUIDE WIRE: HCPCS

## 2018-05-04 PROCEDURE — 84132 ASSAY OF SERUM POTASSIUM: CPT

## 2018-05-04 PROCEDURE — 77030016730 HC CATH ANGI SZ AVU1 ANGI -B

## 2018-05-04 PROCEDURE — 74011250636 HC RX REV CODE- 250/636: Performed by: ANESTHESIOLOGY

## 2018-05-04 PROCEDURE — C1894 INTRO/SHEATH, NON-LASER: HCPCS

## 2018-05-04 PROCEDURE — 76210000020 HC REC RM PH II FIRST 0.5 HR: Performed by: SURGERY

## 2018-05-04 PROCEDURE — 74011636320 HC RX REV CODE- 636/320: Performed by: SURGERY

## 2018-05-04 PROCEDURE — C1887 CATHETER, GUIDING: HCPCS

## 2018-05-04 PROCEDURE — 74011000258 HC RX REV CODE- 258: Performed by: SURGERY

## 2018-05-04 PROCEDURE — 76010000162 HC OR TIME 1.5 TO 2 HR INTENSV-TIER 1: Performed by: SURGERY

## 2018-05-04 PROCEDURE — 82962 GLUCOSE BLOOD TEST: CPT

## 2018-05-04 PROCEDURE — C1760 CLOSURE DEV, VASC: HCPCS

## 2018-05-04 PROCEDURE — 76060000034 HC ANESTHESIA 1.5 TO 2 HR: Performed by: SURGERY

## 2018-05-04 PROCEDURE — 85347 COAGULATION TIME ACTIVATED: CPT

## 2018-05-04 PROCEDURE — 74011000250 HC RX REV CODE- 250: Performed by: SURGERY

## 2018-05-04 PROCEDURE — 74011000250 HC RX REV CODE- 250

## 2018-05-04 PROCEDURE — 77030020782 HC GWN BAIR PAWS FLX 3M -B: Performed by: NURSE ANESTHETIST, CERTIFIED REGISTERED

## 2018-05-04 PROCEDURE — 74011250636 HC RX REV CODE- 250/636: Performed by: SURGERY

## 2018-05-04 PROCEDURE — 75716 ARTERY X-RAYS ARMS/LEGS: CPT

## 2018-05-04 RX ORDER — PROPOFOL 10 MG/ML
INJECTION, EMULSION INTRAVENOUS AS NEEDED
Status: DISCONTINUED | OUTPATIENT
Start: 2018-05-04 | End: 2018-05-04 | Stop reason: HOSPADM

## 2018-05-04 RX ORDER — LIDOCAINE HYDROCHLORIDE 10 MG/ML
INJECTION INFILTRATION; PERINEURAL AS NEEDED
Status: DISCONTINUED | OUTPATIENT
Start: 2018-05-04 | End: 2018-05-04 | Stop reason: HOSPADM

## 2018-05-04 RX ORDER — SODIUM CHLORIDE 9 MG/ML
75 INJECTION, SOLUTION INTRAVENOUS CONTINUOUS
Status: DISCONTINUED | OUTPATIENT
Start: 2018-05-04 | End: 2018-05-04 | Stop reason: HOSPADM

## 2018-05-04 RX ORDER — FENTANYL CITRATE 50 UG/ML
100 INJECTION, SOLUTION INTRAMUSCULAR; INTRAVENOUS ONCE
Status: DISCONTINUED | OUTPATIENT
Start: 2018-05-04 | End: 2018-05-04 | Stop reason: HOSPADM

## 2018-05-04 RX ORDER — PROPOFOL 10 MG/ML
INJECTION, EMULSION INTRAVENOUS
Status: DISCONTINUED | OUTPATIENT
Start: 2018-05-04 | End: 2018-05-04 | Stop reason: HOSPADM

## 2018-05-04 RX ORDER — ONDANSETRON 2 MG/ML
4 INJECTION INTRAMUSCULAR; INTRAVENOUS
Status: DISCONTINUED | OUTPATIENT
Start: 2018-05-04 | End: 2018-05-04 | Stop reason: HOSPADM

## 2018-05-04 RX ORDER — OXYCODONE HYDROCHLORIDE 5 MG/1
5 TABLET ORAL
Status: DISCONTINUED | OUTPATIENT
Start: 2018-05-04 | End: 2018-05-04 | Stop reason: HOSPADM

## 2018-05-04 RX ORDER — HYDROMORPHONE HYDROCHLORIDE 2 MG/ML
0.5 INJECTION, SOLUTION INTRAMUSCULAR; INTRAVENOUS; SUBCUTANEOUS
Status: DISCONTINUED | OUTPATIENT
Start: 2018-05-04 | End: 2018-05-04 | Stop reason: HOSPADM

## 2018-05-04 RX ORDER — MIDAZOLAM HYDROCHLORIDE 1 MG/ML
2 INJECTION, SOLUTION INTRAMUSCULAR; INTRAVENOUS
Status: DISCONTINUED | OUTPATIENT
Start: 2018-05-04 | End: 2018-05-04 | Stop reason: HOSPADM

## 2018-05-04 RX ORDER — HEPARIN SODIUM 1000 [USP'U]/ML
INJECTION, SOLUTION INTRAVENOUS; SUBCUTANEOUS AS NEEDED
Status: DISCONTINUED | OUTPATIENT
Start: 2018-05-04 | End: 2018-05-04 | Stop reason: HOSPADM

## 2018-05-04 RX ORDER — LIDOCAINE HYDROCHLORIDE 20 MG/ML
INJECTION, SOLUTION EPIDURAL; INFILTRATION; INTRACAUDAL; PERINEURAL AS NEEDED
Status: DISCONTINUED | OUTPATIENT
Start: 2018-05-04 | End: 2018-05-04 | Stop reason: HOSPADM

## 2018-05-04 RX ORDER — GLYCOPYRROLATE 0.2 MG/ML
INJECTION INTRAMUSCULAR; INTRAVENOUS AS NEEDED
Status: DISCONTINUED | OUTPATIENT
Start: 2018-05-04 | End: 2018-05-04 | Stop reason: HOSPADM

## 2018-05-04 RX ORDER — PROTAMINE SULFATE 10 MG/ML
INJECTION, SOLUTION INTRAVENOUS AS NEEDED
Status: DISCONTINUED | OUTPATIENT
Start: 2018-05-04 | End: 2018-05-04 | Stop reason: HOSPADM

## 2018-05-04 RX ORDER — MIDAZOLAM HYDROCHLORIDE 1 MG/ML
2 INJECTION, SOLUTION INTRAMUSCULAR; INTRAVENOUS ONCE
Status: COMPLETED | OUTPATIENT
Start: 2018-05-04 | End: 2018-05-04

## 2018-05-04 RX ORDER — CLINDAMYCIN PHOSPHATE 900 MG/50ML
900 INJECTION INTRAVENOUS
Status: DISCONTINUED | OUTPATIENT
Start: 2018-05-04 | End: 2018-05-04 | Stop reason: SDUPTHER

## 2018-05-04 RX ORDER — SODIUM CHLORIDE, SODIUM LACTATE, POTASSIUM CHLORIDE, CALCIUM CHLORIDE 600; 310; 30; 20 MG/100ML; MG/100ML; MG/100ML; MG/100ML
100 INJECTION, SOLUTION INTRAVENOUS CONTINUOUS
Status: DISCONTINUED | OUTPATIENT
Start: 2018-05-04 | End: 2018-05-04 | Stop reason: HOSPADM

## 2018-05-04 RX ORDER — EPHEDRINE SULFATE 50 MG/ML
INJECTION, SOLUTION INTRAVENOUS AS NEEDED
Status: DISCONTINUED | OUTPATIENT
Start: 2018-05-04 | End: 2018-05-04 | Stop reason: HOSPADM

## 2018-05-04 RX ORDER — OXYCODONE AND ACETAMINOPHEN 5; 325 MG/1; MG/1
1 TABLET ORAL
Status: DISCONTINUED | OUTPATIENT
Start: 2018-05-04 | End: 2018-05-04 | Stop reason: HOSPADM

## 2018-05-04 RX ORDER — LIDOCAINE HYDROCHLORIDE 10 MG/ML
0.1 INJECTION INFILTRATION; PERINEURAL AS NEEDED
Status: DISCONTINUED | OUTPATIENT
Start: 2018-05-04 | End: 2018-05-04 | Stop reason: HOSPADM

## 2018-05-04 RX ORDER — OXYCODONE HYDROCHLORIDE 5 MG/1
10 TABLET ORAL
Status: DISCONTINUED | OUTPATIENT
Start: 2018-05-04 | End: 2018-05-04 | Stop reason: HOSPADM

## 2018-05-04 RX ORDER — NALOXONE HYDROCHLORIDE 0.4 MG/ML
0.1 INJECTION, SOLUTION INTRAMUSCULAR; INTRAVENOUS; SUBCUTANEOUS AS NEEDED
Status: DISCONTINUED | OUTPATIENT
Start: 2018-05-04 | End: 2018-05-04 | Stop reason: HOSPADM

## 2018-05-04 RX ORDER — DIPHENHYDRAMINE HYDROCHLORIDE 50 MG/ML
12.5 INJECTION, SOLUTION INTRAMUSCULAR; INTRAVENOUS
Status: DISCONTINUED | OUTPATIENT
Start: 2018-05-04 | End: 2018-05-04 | Stop reason: HOSPADM

## 2018-05-04 RX ORDER — MORPHINE SULFATE 10 MG/ML
1 INJECTION, SOLUTION INTRAMUSCULAR; INTRAVENOUS
Status: DISCONTINUED | OUTPATIENT
Start: 2018-05-04 | End: 2018-05-04 | Stop reason: HOSPADM

## 2018-05-04 RX ORDER — ONDANSETRON 2 MG/ML
4 INJECTION INTRAMUSCULAR; INTRAVENOUS ONCE
Status: DISCONTINUED | OUTPATIENT
Start: 2018-05-04 | End: 2018-05-04 | Stop reason: HOSPADM

## 2018-05-04 RX ORDER — ALBUTEROL SULFATE 0.83 MG/ML
2.5 SOLUTION RESPIRATORY (INHALATION) AS NEEDED
Status: DISCONTINUED | OUTPATIENT
Start: 2018-05-04 | End: 2018-05-04 | Stop reason: HOSPADM

## 2018-05-04 RX ADMIN — PROTAMINE SULFATE 10 MG: 10 INJECTION, SOLUTION INTRAVENOUS at 12:20

## 2018-05-04 RX ADMIN — PROTAMINE SULFATE 10 MG: 10 INJECTION, SOLUTION INTRAVENOUS at 12:19

## 2018-05-04 RX ADMIN — PROPOFOL 160 MCG/KG/MIN: 10 INJECTION, EMULSION INTRAVENOUS at 11:10

## 2018-05-04 RX ADMIN — GLYCOPYRROLATE 0.2 MG: 0.2 INJECTION INTRAMUSCULAR; INTRAVENOUS at 11:16

## 2018-05-04 RX ADMIN — HEPARIN SODIUM 5000 UNITS: 1000 INJECTION, SOLUTION INTRAVENOUS; SUBCUTANEOUS at 11:49

## 2018-05-04 RX ADMIN — PROTAMINE SULFATE 10 MG: 10 INJECTION, SOLUTION INTRAVENOUS at 12:17

## 2018-05-04 RX ADMIN — CLINDAMYCIN PHOSPHATE 900 MG: 150 INJECTION, SOLUTION INTRAVENOUS at 11:11

## 2018-05-04 RX ADMIN — PROPOFOL 30 MG: 10 INJECTION, EMULSION INTRAVENOUS at 11:10

## 2018-05-04 RX ADMIN — PROTAMINE SULFATE 10 MG: 10 INJECTION, SOLUTION INTRAVENOUS at 12:21

## 2018-05-04 RX ADMIN — LIDOCAINE HYDROCHLORIDE 40 MG: 20 INJECTION, SOLUTION EPIDURAL; INFILTRATION; INTRACAUDAL; PERINEURAL at 11:10

## 2018-05-04 RX ADMIN — EPHEDRINE SULFATE 10 MG: 50 INJECTION, SOLUTION INTRAVENOUS at 11:33

## 2018-05-04 RX ADMIN — GENTAMICIN SULFATE 350 MG: 40 INJECTION, SOLUTION INTRAMUSCULAR; INTRAVENOUS at 09:14

## 2018-05-04 RX ADMIN — MIDAZOLAM HYDROCHLORIDE 2 MG: 1 INJECTION, SOLUTION INTRAMUSCULAR; INTRAVENOUS at 09:23

## 2018-05-04 RX ADMIN — EPHEDRINE SULFATE 10 MG: 50 INJECTION, SOLUTION INTRAVENOUS at 11:37

## 2018-05-04 RX ADMIN — SODIUM CHLORIDE, SODIUM LACTATE, POTASSIUM CHLORIDE, AND CALCIUM CHLORIDE 100 ML/HR: 600; 310; 30; 20 INJECTION, SOLUTION INTRAVENOUS at 09:13

## 2018-05-04 RX ADMIN — EPHEDRINE SULFATE 10 MG: 50 INJECTION, SOLUTION INTRAVENOUS at 11:31

## 2018-05-04 RX ADMIN — EPHEDRINE SULFATE 10 MG: 50 INJECTION, SOLUTION INTRAVENOUS at 11:41

## 2018-05-04 NOTE — BRIEF OP NOTE
[address]   [address], [address] [facility]. . [facility] 125 FAX: [phone]    Brief Op Note Template Note    Pre-Op Diagnosis: Peripheral vascular disease, unspecified ([address] [I73.9]    Post-Op Diagnosis:  Peripheral vascular disease, unspecified ([address] [I73.9]    Procedures: Procedure(s):  [address], ,ABDOMINAL AORTOGTAM , PTA  ILIAC     Surgeon: Kristin Junior MD    Assistants: Surgeon(s):  Kristin Junior MD      Anesthesia:  General     Findings: right illac angioplasty 9x4 ballon    Tourniquet Time:  * No tourniquets in log *    Estimated Blood Loss:               Specimens: none           Implants:  * No implants in log *    Complications: None               Signed: Kristin Junior MD      Elements of this note have been dictated using speech recognition software. As a result, errors of speech recognition may have occurred.

## 2018-05-04 NOTE — PERIOP NOTES
[facility] called and gave orders to send patient home with a pressure dressing on the right groin site and a bag of fluid to place on the groin for the evening. Patient educated on modified discharge instructions and verbalize understanding.

## 2018-05-04 NOTE — DISCHARGE INSTRUCTIONS
Arteriogram: What to Expect at [address] [place] [address] Drive inserted a thin, flexible tube (catheter) into a blood vessel in your groin. In some cases, the catheter is placed in a blood vessel in the arm. After an arteriogram, your groin or arm may have a bruise and feel sore for a day or two. You can do light activities around the house but nothing strenuous for several days. Your doctor may give you specific instructions on when you can do your normal activities again, such as driving and going back to work. This care sheet gives you a general idea about how long it will take for you to recover. But each person recovers at a different pace. Follow the steps below to feel better as quickly as possible. How can you care for yourself at home? Activity  · Do not do strenuous exercise and do not lift, pull, or push anything heavy until your doctor says it is okay. This may be for a day or two. You can walk around the house and do light activity, such as cooking. · You may shower 24 to 48 hours after the procedure, if your doctor okays it. Pat the incision dry. Do not take a bath for 1 week, or until your doctor tells you it is okay. · If the catheter was placed in your groin, try not to walk up stairs for the first couple of days. · If your doctor recommends it, get more exercise. Walking is a good choice. Bit by bit, increase the amount you walk every day. Try for at least 30 minutes on most days of the week. Diet  · Drink plenty of fluids to help your body flush out the dye. If you have kidney, heart, or liver disease and have to limit fluids, talk with your doctor before you increase the amount of fluids you drink. · You can eat your normal diet. If your stomach is upset, try bland, low-fat foods like plain rice, broiled chicken, toast, and yogurt. Medicines  · Be safe with medicines. Read and follow all instructions on the label.   ¨ If the doctor gave you a prescription medicine for pain, take it as prescribed. ¨ If you are not taking a prescription pain medicine, ask your doctor if you can take an over-the-counter medicine. · If you take blood thinners, such as warfarin (Coumadin), clopidogrel (Plavix), or aspirin, be sure to talk to your doctor. He or she will tell you if and when to start taking those medicines again. Make sure that you understand exactly what your doctor wants you to do. · Your doctor will tell you if and when you can restart your medicines. He or she will also give you instructions about taking any new medicines. Care of the catheter site  · You will have a dressing over the cut (incision). A dressing helps the incision heal and protects it. Your doctor will tell you how to take care of this. · Put ice or a cold pack on the area for 10 to 20 minutes at a time to help with soreness or swelling. Put a thin cloth between the ice and your skin. Follow-up care is a key part of your treatment and safety. Be sure to make and go to all appointments, and call your doctor if you are having problems. It's also a good idea to know your test results and keep a list of the medicines you take. When should you call for help? Call 911 anytime you think you may need emergency care. For example, call if:  · You passed out (lost consciousness). · You have severe trouble breathing. · You have sudden chest pain and shortness of breath, or you cough up blood. Call your doctor now or seek immediate medical care if:  · You are bleeding from the area where the catheter was put in your artery. · You have a fast-growing, painful lump at the catheter site. · You have signs of infection, such as:  ¨ Increased pain, swelling, warmth, or redness. ¨ Red streaks leading from the incision. ¨ Pus draining from the incision. ¨ A fever. Watch closely for any changes in your health, and be sure to contact your doctor if:  · You don't get better as expected.   After general anesthesia or intravenous sedation, for 24 hours or while taking prescription Narcotics:  · Limit your activities  · Do not drive and operate hazardous machinery  · Do not make important personal or business decisions  · Do  not drink alcoholic beverages  · If you have not urinated within 8 hours after discharge, please contact your surgeon on call. *  Please give a list of your current medications to your Primary Care Provider. *  Please update this list whenever your medications are discontinued, doses are      changed, or new medications (including over-the-counter products) are added. *  Please carry medication information at all times in case of emergency situations. These are general instructions for a healthy lifestyle:  No smoking/ No tobacco products/ Avoid exposure to second hand smoke  Surgeon General's Warning:  Quitting smoking now greatly reduces serious risk to your health. Obesity, smoking, and sedentary lifestyle greatly increases your risk for illness  A healthy diet, regular physical exercise & weight monitoring are important for maintaining a healthy lifestyle    You may be retaining fluid if you have a history of heart failure or if you experience any of the following symptoms:  Weight gain of 3 pounds or more overnight or 5 pounds in a week, increased swelling in our hands or feet or shortness of breath while lying flat in bed. Please call your doctor as soon as you notice any of these symptoms; do not wait until your next office visit. Recognize signs and symptoms of STROKE:    F-face looks uneven    A-arms unable to move or move unevenly    S-speech slurred or non-existent    T-time-call 911 as soon as signs and symptoms begin-DO NOT go       Back to bed or wait to see if you get better-TIME IS BRAIN.

## 2018-05-04 NOTE — PERIOP NOTES
After ambulation patient had mild leaking from right groin site. Groin redressed with gauze and tegaderm and patient put back on rest for 30 minutes to check status of leaking.

## 2018-05-04 NOTE — IP AVS SNAPSHOT
[facility] 
 
 
 [address] [facility]  [address] 
[phone] Patient: Kylah Henry. MRN: [id number] DRW:[id number] About your hospitalization You were admitted on: May 4, 2018 You last received care in the:  [facility] PACU You were discharged on: May 4, 2018 Why you were hospitalized Your primary diagnosis was:  Not on File Follow-up Information Follow up With Details Comments Contact Info Calvin Belle MD   [facility] [address] [facility] [address] 
[phone] Jhoana Coronel MD Call today call and make a 2 month follow up appointment [place] [address] [address] [facility] [address] 
[phone] Your Scheduled Appointments Tuesday May 15, 2018  1:00 PM EDT Office Visit with Calvin Belle MD  
[facility] ([facility]) [address]   
[phone] Wednesday May 16, 2018  9:40 AM EDT  
(Arrive by 9:20 AM) Return appointment with [address] [place] [address] [address] ([place] [facility]) [place] [address] [address] [place] [address] [facility]  
[phone] Friday May 25, 2018 10:45 AM EDT  
ESTABLISHED PATIENT with Jhoana Coronel MD  
[facility] ([facility]) [facility] [address] [facility] [id number] [phone] Discharge Orders None A check judy indicates which time of day the medication should be taken. My Medications CHANGE how you take these medications Instructions Each Dose to Equal  
 Morning Noon Evening Bedtime  
 furosemide 40 mg tablet Commonly known as:  LASIX What changed:   
- how much to take - when to take this 
- additional instructions Your last dose was: Your next dose is: One daily CONTINUE taking these medications Instructions Each Dose to Equal  
 Morning Noon Evening Bedtime ASPIR-81 81 mg tablet Generic drug:  aspirin delayed-release Your last dose was: Your next dose is:    
   
   
 take 81 mg by mouth daily. 81 mg  
    
   
   
   
  
 atorvastatin 80 mg tablet Commonly known as:  LIPITOR Your last dose was: Your next dose is: Take 1 Tab by mouth daily. 80 mg  
    
   
   
   
  
 buPROPion  mg XL tablet Commonly known as:  Donna Stevenson Your last dose was: Your next dose is: Take 1 Tab by mouth daily. 300 mg  
    
   
   
   
  
 carvedilol 12.5 mg tablet Commonly known as:  Kath Ignacio Your last dose was: Your next dose is:    
   
   
 12.5 mg two (2) times a day. 12.5 mg  
    
   
   
   
  
 clopidogrel 75 mg Tab Commonly known as:  PLAVIX Your last dose was: Your next dose is: Take 1 Tab by mouth daily. 75 mg  
    
   
   
   
  
 cpap machine kit Your last dose was: Your next dose is:    
   
   
 by Does Not Apply route. autopap 5-20  
     
   
   
   
  
 ergocalciferol 50,000 unit capsule Commonly known as:  ERGOCALCIFEROL Your last dose was: Your next dose is: Take 50,000 Units by mouth every Sunday. [address] Units  
    
   
   
   
  
 finasteride 5 mg tablet Commonly known as:  PROSCAR Your last dose was: Your next dose is: Take 1 Tab by mouth daily. 5 mg  
    
   
   
   
  
 hydrALAZINE 25 mg tablet Commonly known as:  APRESOLINE Your last dose was: Your next dose is: Take 25 mg by mouth three (3) times daily. 25 mg  
    
   
   
   
  
 isosorbide mononitrate  mg CR tablet Commonly known as:  IMDUR Your last dose was: Your next dose is: Take 1 Tab by mouth daily. 1 Tab LANTUS U-100 INSULIN 100 unit/mL injection Generic drug:  insulin glargine Your last dose was: Your next dose is:    
   
   
 by SubCUTAneous route two (2) times a day. Inject upto 35u bid per SS  
     
   
   
   
  
 losartan 100 mg tablet Commonly known as:  COZAAR Your last dose was: Your next dose is: Take 1 Tab by mouth daily. 100 mg  
    
   
   
   
  
 nitroglycerin 0.4 mg SL tablet Commonly known as:  NITROSTAT Your last dose was: Your next dose is:    
   
   
 by SubLINGual route every five (5) minutes as needed for Chest Pain.  
     
   
   
   
  
 pantoprazole 40 mg tablet Commonly known as:  PROTONIX Your last dose was: Your next dose is: TAKE ONE (1) TABLET(S) ONCE DAILY potassium chloride 20 mEq tablet Commonly known as:  KLOR-CON M20 Your last dose was: Your next dose is: Take 1 Tab by mouth daily. 20 mEq PROAIR HFA 90 mcg/actuation inhaler Generic drug:  albuterol Your last dose was: Your next dose is:    
   
   
 2 Puffs every six (6) hours as needed. 2 Puff  
    
   
   
   
  
 ranolazine ER 1,000 mg  
Commonly known as:  RANEXA Your last dose was: Your next dose is: Take 1 Tab by mouth two (2) times a day. 1000 mg SPIRIVA WITH HANDIHALER 18 mcg inhalation capsule Generic drug:  tiotropium Your last dose was: Your next dose is: Take 1 Cap by inhalation daily. 1 Cap  
    
   
   
   
  
 tamsulosin 0.4 mg capsule Commonly known as:  FLOMAX Your last dose was: Your next dose is: Take 0.4 mg by mouth two (2) times a day. 0.4 mg Discharge Instructions Arteriogram: What to Expect at [facility] Your Recovery The doctor inserted a thin, flexible tube (catheter) into a blood vessel in your groin. In some cases, the catheter is placed in a blood vessel in the arm. After an arteriogram, your groin or arm may have a bruise and feel sore for a day or two. You can do light activities around the house but nothing strenuous for several days. Your doctor may give you specific instructions on when you can do your normal activities again, such as driving and going back to work. This care sheet gives you a general idea about how long it will take for you to recover. But each person recovers at a different pace. Follow the steps below to feel better as quickly as possible. How can you care for yourself at home? Activity · Do not do strenuous exercise and do not lift, pull, or push anything heavy until your doctor says it is okay. This may be for a day or two. You can walk around the house and do light activity, such as cooking. · You may shower 24 to 48 hours after the procedure, if your doctor okays it. Pat the incision dry. Do not take a bath for 1 week, or until your doctor tells you it is okay. · If the catheter was placed in your groin, try not to walk up stairs for the first couple of days. · If your doctor recommends it, get more exercise. Walking is a good choice. Bit by bit, increase the amount you walk every day. Try for at least 30 minutes on most days of the week. Diet · Drink plenty of fluids to help your body flush out the dye. If you have kidney, heart, or liver disease and have to limit fluids, talk with your doctor before you increase the amount of fluids you drink. · You can eat your normal diet. If your stomach is upset, try bland, low-fat foods like plain rice, broiled chicken, toast, and yogurt. Medicines · Be safe with medicines. Read and follow all instructions on the label. ¨ If the doctor gave you a prescription medicine for pain, take it as prescribed. ¨ If you are not taking a prescription pain medicine, ask your doctor if you can take an over-the-counter medicine. · If you take blood thinners, such as warfarin (Coumadin), clopidogrel (Plavix), or aspirin, be sure to talk to your doctor. He or she will tell you if and when to start taking those medicines again. Make sure that you understand exactly what your doctor wants you to do. · Your doctor will tell you if and when you can restart your medicines. He or she will also give you instructions about taking any new medicines. Care of the catheter site · You will have a dressing over the cut (incision). A dressing helps the incision heal and protects it. Your doctor will tell you how to take care of this. · Put ice or a cold pack on the area for 10 to 20 minutes at a time to help with soreness or swelling. Put a thin cloth between the ice and your skin. Follow-up care is a key part of your treatment and safety. Be sure to make and go to all appointments, and call your doctor if you are having problems. It's also a good idea to know your test results and keep a list of the medicines you take. When should you call for help? Call 911 anytime you think you may need emergency care. For example, call if: 
· You passed out (lost consciousness). · You have severe trouble breathing. · You have sudden chest pain and shortness of breath, or you cough up blood. Call your doctor now or seek immediate medical care if: 
· You are bleeding from the area where the catheter was put in your artery. · You have a fast-growing, painful lump at the catheter site. · You have signs of infection, such as: 
¨ Increased pain, swelling, warmth, or redness. ¨ Red streaks leading from the incision. ¨ Pus draining from the incision. ¨ A fever. Watch closely for any changes in your health, and be sure to contact your doctor if: 
· You don't get better as expected. After general anesthesia or intravenous sedation, for 24 hours or while taking prescription Narcotics: · Limit your activities · Do not drive and operate hazardous machinery · Do not make important personal or business decisions · Do  not drink alcoholic beverages · If you have not urinated within 8 hours after discharge, please contact your surgeon on call. *  Please give a list of your current medications to your Primary Care Provider. *  Please update this list whenever your medications are discontinued, doses are 
    changed, or new medications (including over-the-counter products) are added. *  Please carry medication information at all times in case of emergency situations. These are general instructions for a healthy lifestyle: No smoking/ No tobacco products/ Avoid exposure to second hand smoke Surgeon General's Warning:  Quitting smoking now greatly reduces serious risk to your health. Obesity, smoking, and sedentary lifestyle greatly increases your risk for illness A healthy diet, regular physical exercise & weight monitoring are important for maintaining a healthy lifestyle You may be retaining fluid if you have a history of heart failure or if you experience any of the following symptoms:  Weight gain of 3 pounds or more overnight or 5 pounds in a week, increased swelling in our hands or feet or shortness of breath while lying flat in bed. Please call your doctor as soon as you notice any of these symptoms; do not wait until your next office visit. Recognize signs and symptoms of STROKE: 
 
F-face looks uneven A-arms unable to move or move unevenly S-speech slurred or non-existent T-time-call 911 as soon as signs and symptoms begin-DO NOT go Back to bed or wait to see if you get better-TIME IS BRAIN. ACO Transitions of Care Introducing [employer] Kati Wesley offers a voluntary care coordination program to provide high quality service and care to [facility] fee-for-service beneficiaries. Yessi Baltazar was designed to help you enhance your health and well-being through the following services: ? Transitions of Care  support for individuals who are transitioning from one care setting to another (example: Hospital to home). ? Chronic and Complex Care Coordination  support for individuals and caregivers of those with serious or chronic illnesses or with more than one chronic (ongoing) condition and those who take a number of different medications. If you meet specific medical criteria, a [address] [place] [address] Rd may call you directly to coordinate your care with your primary care physician and your other care providers. For questions about the [facility]) programs, please, contact your physicians office. For general questions or additional information about Accountable Care Organizations: 
Please visit www.medicare.gov/acos. html or call 1-800-MEDICARE (6-852.875.3176) TTY users should call 5-146.595.1999. Introducing Negro nOeal As a [facility] patient, I wanted to make you aware of our electronic visit tool called Negro Jacobojhonathan. [facility] 24/7 allows you to connect within minutes with a medical provider 24 hours a day, seven days a week via a mobile device or tablet or logging into a secure website from your computer. You can access Negro Oneal from anywhere in the [place]. A virtual visit might be right for you when you have a simple condition and feel like you just dont want to get out of bed, or cant get away from work for an appointment, when your regular [facility] provider is not available (evenings, weekends or holidays), or when youre out of town and need minor care.   Electronic visits cost only $49 and if the [employer] 24/7 provider determines a prescription is needed to treat your condition, one can be electronically transmitted to a nearby pharmacy*. Please take a moment to enroll today if you have not already done so. The enrollment process is free and takes just a few minutes. To enroll, please download the [employer] 24/7 sussy to your tablet or phone, or visit [email or web address] to enroll on your computer. And, as an [address] [place] [address] patient with a [employer] account, the results of your visits will be scanned into your electronic medical record and your primary care provider will be able to view the scanned results. We urge you to continue to see your regular [employer] provider for your ongoing medical care. And while your primary care provider may not be the one available when you seek a [employer] virtual visit, the peace of mind you get from getting a real diagnosis real time can be priceless. For more information on [employer], view our Frequently Asked Questions (FAQs) at [email or web address]. Sincerely, 
 
Veronica Cotton MD 
Chief Medical Officer [facility] Kati Wesley *:  certain medications cannot be prescribed via [employer] Unresulted Labs-Please follow up with your PCP about these lab tests Order Current Status IR ANGIO EXT LOWER BI In process Providers Seen During Your Hospitalization Provider Specialty Primary office phone Misty Huang MD Vascular Surgery [phone] Your Primary Care Physician (PCP) Primary Care Physician Office Phone Office Fax Ileana An [phone] You are allergic to the following Allergen Reactions Pcn (Penicillins) Swelling Recent Documentation Height Weight BMI Smoking Status 1.753 m 88.1 kg 28.69 kg/m2 Former Smoker Emergency Contacts Name Discharge Info Relation Home Work Mobile EKATERINA Urena CAREGIVER [3] Spouse [3] [phone] Patient Belongings The following personal items are in your possession at time of discharge: 
  Dental Appliances: Lowers, Uppers  Visual Aid: Glasses      Home Medications: None   Jewelry: None  Clothing: Pants, Shirt, Footwear, Socks, Undergarments, Belt (suspenders)       Personal Items Sent to Safe: none Please provide this summary of care documentation to your next provider. Signatures-by signing, you are acknowledging that this After Visit Summary has been reviewed with you and you have received a copy. Patient Signature:  ____________________________________________________________ Date:  ____________________________________________________________  
  
[facility] Provider Signature:  ____________________________________________________________ Date:  ____________________________________________________________

## 2018-05-05 NOTE — OP NOTES
[facility] REPORT    Name:LAYLA Jones  MR#: [id number]  : 1940  ACCOUNT #: [de-identified]   DATE OF SERVICE: 2018    CLINICAL SERVICE:  Vascular Surgery. PREOPERATIVE DIAGNOSIS:  Debilitating right bilateral leg claudication. POSTOPERATIVE DIAGNOSIS:  Debilitating right bilateral leg claudication. PROCEDURES PERFORMED:  1.  Bilateral ultrasound-guided access of common femoral artery with placement of catheter in aorta and aortogram.  2.  Right common iliac angioplasty with a 9 x 4 balloon. SURGEON:  Andrea Wood. Claritza Navarro MD     ASSISTANT:      ANESTHESIA:  Sedation. ESTIMATED BLOOD LOSS:       SPECIMENS REMOVED:      COMPLICATIONS:      IMPLANTS:      OPERATIVE FINDINGS:  1. There was no evidence of aortic disease. 2.  Right proximal iliac artery had a 50% stenosis. Left iliac artery was calcified, but was patent. Bilateral hypogastric arteries were significant disease. Distal external arteries were without any significant stenosis. PROCEDURE IN DETAIL:  After getting informed consent, the patient was brought to the operating room, general anesthesia was induced. Preoperative antibiotics were given before skin incision. The patient's bilateral groin was all prepped and draped in normal sterile fashion. Ultrasound-guided access and access to the right common femoral artery using a micropuncture technique. We then upsized to a 7 Western Cyn sheath over an 0.035 starter wire and the wire was then directed into the retroaortic. The Omni flush catheter was then directed into the distal aorta, in a BURGOS/ [place] we did an aortogram.  This showed about 50% stenosis of the proximal right common iliac artery. We then brought on to the field a 9 x 4 balloon and we balloon angioplastied to nominal and burst pressure was 13 mmHg. We removed it back and did a follow completion , it was much improved, was definitely less than 30%.   The external and hypogastric was still patent. There was an indication for some left external iliac disease on the ultrasound, so we did a micropuncture technique of the left common femoral artery and then upsized to a 5-Azeri sheath. Through a sheathogram, we did not see any heavily disease in the external, even on BURGOS and CATHI projections. We were then happy with the procedure. We then reversed the patient with 40 mg of protamine after giving him 5000 prior to angioplasty. We put Mynx devices in both groin over 0.035 wire. We held pressure on the left because the Mynx device did not deploy through the skin, so we held pressure for 20 minutes. The right Mynx device worked. We were happy with the procedure. The patient was extubated, taken to the PACU in stable condition.       MD LULY Paz / [place]  D: 05/04/2018 13:32     T: 05/04/2018 21:58  JOB #: [id number]

## 2018-05-24 ENCOUNTER — HOSPITAL ENCOUNTER (OUTPATIENT)
Dept: MRI IMAGING | Age: 78
Discharge: HOME | End: 2018-05-24
Attending: INTERNAL MEDICINE
Payer: COMMERCIAL

## 2018-05-24 DIAGNOSIS — M79.605 LOW BACK PAIN RADIATING TO BOTH LEGS: ICD-10-CM

## 2018-05-24 DIAGNOSIS — M54.50 LOW BACK PAIN RADIATING TO BOTH LEGS: ICD-10-CM

## 2018-05-24 DIAGNOSIS — M79.604 LOW BACK PAIN RADIATING TO BOTH LEGS: ICD-10-CM

## 2018-05-24 PROCEDURE — 72148 MRI LUMBAR SPINE W/O DYE: CPT

## 2018-09-22 ENCOUNTER — APPOINTMENT (OUTPATIENT)
Dept: CT IMAGING | Age: 78
End: 2018-09-22
Attending: EMERGENCY MEDICINE
Payer: MEDICARE

## 2018-09-22 ENCOUNTER — HOSPITAL ENCOUNTER (EMERGENCY)
Age: 78
Discharge: HOME | End: 2018-09-22
Attending: EMERGENCY MEDICINE
Payer: MEDICARE

## 2018-09-22 VITALS
BODY MASS INDEX: 30.53 KG/M2 | DIASTOLIC BLOOD PRESSURE: 68 MMHG | WEIGHT: 190 LBS | TEMPERATURE: 98.3 F | SYSTOLIC BLOOD PRESSURE: 153 MMHG | HEART RATE: 84 BPM | RESPIRATION RATE: 18 BRPM | OXYGEN SATURATION: 96 % | HEIGHT: 66 IN

## 2018-09-22 DIAGNOSIS — S01.21XA LACERATION OF NOSE, INITIAL ENCOUNTER: Primary | ICD-10-CM

## 2018-09-22 DIAGNOSIS — T07.XXXA MULTIPLE ABRASIONS: ICD-10-CM

## 2018-09-22 PROCEDURE — 90471 IMMUNIZATION ADMIN: CPT | Performed by: EMERGENCY MEDICINE

## 2018-09-22 PROCEDURE — 75810000215 HC WOUND REPAIR OTHER: Performed by: EMERGENCY MEDICINE

## 2018-09-22 PROCEDURE — 74011250636 HC RX REV CODE- 250/636: Performed by: EMERGENCY MEDICINE

## 2018-09-22 PROCEDURE — 99284 EMERGENCY DEPT VISIT MOD MDM: CPT | Performed by: EMERGENCY MEDICINE

## 2018-09-22 PROCEDURE — 90715 TDAP VACCINE 7 YRS/> IM: CPT | Performed by: EMERGENCY MEDICINE

## 2018-09-22 PROCEDURE — 77030010507 HC ADH SKN DERMBND J&J -B: Performed by: EMERGENCY MEDICINE

## 2018-09-22 RX ADMIN — TETANUS TOXOID, REDUCED DIPHTHERIA TOXOID AND ACELLULAR PERTUSSIS VACCINE, ADSORBED 0.5 ML: 5; 2.5; 8; 8; 2.5 SUSPENSION INTRAMUSCULAR at 19:11

## 2018-09-22 NOTE — ED TRIAGE NOTES
Patient presents via [facility] for trip & fall with laceration. Patient fell over a curb, laceration to bridge of nose, no LOC. Bleeding controlled on arrival. . Wife states patient is receiving radiation for cancer & does take blood thinners.

## 2018-09-22 NOTE — ED NOTES
I have reviewed discharge instructions with the patient. The patient verbalized understanding. Patient left ED via Discharge Method: ambulatory to Home with wife. Opportunity for questions and clarification provided. Patient given 0 scripts. To continue your aftercare when you leave the hospital, you may receive an automated call from our care team to check in on how you are doing. This is a free service and part of our promise to provide the best care and service to meet your aftercare needs.  If you have questions, or wish to unsubscribe from this service please call [phone]. Thank you for Choosing our [employer] [facility].

## 2018-09-22 NOTE — DISCHARGE INSTRUCTIONS
Cuts Closed With Adhesives: Care Instructions  Your Care Instructions  A cut can happen anywhere on your body. The doctor used an adhesive to close the cut. When the adhesive dries, it forms a film that holds the edges of the cut together. Skin adhesives are sometimes called liquid stitches. If the cut went deep and through the skin, the doctor may have put in a layer of stitches below the adhesive. The deeper layer of stitches brings the deep part of the cut together. These stitches will dissolve and don't need to be removed. You don't see the stitches, only the adhesive. You may have a bandage. The doctor has checked you carefully, but problems can develop later. If you notice any problems or new symptoms, get medical treatment right away. Follow-up care is a key part of your treatment and safety. Be sure to make and go to all appointments, and call your doctor if you are having problems. It's also a good idea to know your test results and keep a list of the medicines you take. How can you care for yourself at home? · Keep the cut dry for the first 24 to 48 hours. After this, you can shower if your doctor okays it. Pat the cut dry. · Don't soak the cut, such as in a bathtub. Your doctor will tell you when it's safe to get the cut wet. · If your doctor told you how to care for your cut, follow your doctor's instructions. If you did not get instructions, follow this general advice:  ¨ Do not put any kind of ointment, cream, or lotion over the area. This can make the adhesive fall off too soon. ¨ After the first 24 to 48 hours, wash around the cut with clean water 2 times a day. Do not use hydrogen peroxide or alcohol, which can slow healing. ¨ If the doctor told you to use a bandage, put on a new bandage after cleaning the cut or if the bandage gets wet or dirty. · Prop up the sore area on a pillow anytime you sit or lie down during the next 3 days. Try to keep it above the level of your heart. This will help reduce swelling. · Leave the skin adhesive on your skin until it falls off on its own. This may take 5 to 10 days. · Do not scratch, rub, or pick at the adhesive. · Do not put the sticky part of a bandage directly on the adhesive. · Avoid any activity that could cause your cut to reopen. · Be safe with medicines. Read and follow all instructions on the label. ¨ If the doctor gave you a prescription medicine for pain, take it as prescribed. ¨ If you are not taking a prescription pain medicine, ask your doctor if you can take an over-the-counter medicine. When should you call for help? Call your doctor now or seek immediate medical care if:    · You have new pain, or your pain gets worse.     · The skin near the cut is cold or pale or changes color.     · You have tingling, weakness, or numbness near the cut.     · The cut starts to bleed.     · You have trouble moving the area near the cut.     · You have symptoms of infection, such as:  ¨ Increased pain, swelling, warmth, or redness around the cut. ¨ Red streaks leading from the cut. ¨ Pus draining from the cut. ¨ A fever.    Watch closely for changes in your health, and be sure to contact your doctor if:    · The cut reopens.     · You do not get better as expected. Where can you learn more? Go to http://mandeep-meena.info/. Enter P174 in the search box to learn more about \"Cuts Closed With Adhesives: Care Instructions. \"  Current as of: November 20, 2017  Content Version: 11.7  © 2566-2694 Healthwise, Incorporated. Care instructions adapted under license by [employer] (which disclaims liability or warranty for this information). If you have questions about a medical condition or this instruction, always ask your healthcare professional. [address] any warranty or liability for your use of this information.

## 2018-09-22 NOTE — ED PROVIDER NOTES
HPI Comments: Patient presents to the emergency room and after a trip and fall at a wedding this evening. The patient states he [place] when he stumbled and fell forward. He sustained an injury to his nose and forehead but denies loss of consciousness denies syncope denies any painful injuries elsewhere other than the left ring finger. His tetanus status is unknown. He denies epistaxis denies vision changes there's been no history of memory loss nausea or disequilibrium Patient is a 70 y.o. male presenting with fall. The history is provided by the patient and the spouse. Fall The accident occurred 1 to 2 hours ago. The fall occurred while walking. He fell from a height of ground level. He landed on concrete. The volume of blood lost was minimal. Point of impact: face/head/knees/hands. Pain location: nose, left ring finger. The pain is at a severity of 0/10. The patient is experiencing no pain. He was ambulatory at the scene. There was no entrapment after the fall. There was no drug use involved in the accident. There was no alcohol use involved in the accident. Associated symptoms include laceration. Pertinent negatives include no visual change, no fever, no numbness, no abdominal pain, no bowel incontinence, no nausea, no vomiting, no hematuria, no headaches, no extremity weakness, no hearing loss, no loss of consciousness and no tingling. The risk factors include being elderly (anti-platelet medication). Exacerbated by: nothing. Treatments tried: dressing applied. It is unknown when the patient last had a tetanus shot. Past Medical History:  
Diagnosis Date  Abdominal distension 11/5/2013  Acute gout 11/5/2013  Anemia   
 mild  Anxiety 5/1/2013  Anxiety and depression 5/1/2013  BPH (benign prostatic hyperplasia) 11/5/2013  Bronchitis 11/5/2013  CAD (coronary artery disease) 11/5/2013  CAD (coronary artery disease) 11/5/2013  Carcinoma, lung ([facility] 8/10/2015  Chronic obstructive pulmonary disease ([facility]  CKD (chronic kidney disease) 11/5/2013  Conjunctivitis 11/5/2013  DEMENTIA UNDIAGNOSED  Depression  Diabetes ([facility]   
 insulin dependent   a1c  2/18  6.3  Diabetes mellitus ([facility] 5/1/2013  DJD (degenerative joint disease) 11/5/2013  Fatty liver 11/7/2013  Fatty liver 11/7/2013  Gall stone 11/5/2013  GERD (gastroesophageal reflux disease) 11/5/2013  
 controlled with meds  GERD (gastroesophageal reflux disease) 11/5/2013  Hepatomegaly 11/5/2013  Hepatomegaly 11/5/2013  Hepatomegaly 11/5/2013  History of GI tumor 11/5/2013  Hypercholesterolemia  Hypertension   
 controlled with meds  Hypoglycemia 11/5/2013  Hypoglycemia 11/5/2013  Insomnia 11/5/2013  Noncompliance 11/5/2013  PAD (peripheral artery disease) ([facility] 11/5/2013  Persistent disorder of initiating or maintaining sleep 2/10/2015  Psychiatric disorder DEPRESSION  
 Sleep apnea   
 uses cpap  Urinary frequency 11/5/2013 Past Surgical History:  
Procedure Laterality Date  ABDOMEN SURGERY PROC UNLISTED  2012  
 tumor removed  HX COLONOSCOPY  VASCULAR SURGERY PROCEDURE UNLIST Right 2/3/15 [facility] arteriogram  
 VASCULAR SURGERY PROCEDURE UNLIST Right 05/04/2018 [facility] arteriogram/angioplasty  VASCULAR SURGERY PROCEDURE UNLIST Right 05/04/2018  
 iliac angio Family History:  
Problem Relation Age of Onset  Cancer Mother  Hypertension Other   
  siblings  Diabetes Other   
  siblings  Pacemaker Other   
  2 brothers and sister with pacemaker and defibrillator Social History Social History  Marital status:  Spouse name: N/A  
 Number of children: N/A  
 Years of education: N/A Occupational History  Not on file. Social History Main Topics  Smoking status: Former Smoker Packs/day: 0.50   Years: 53.00  
 Start date: 1/1/1957 Quit date: 1/1/2007  Smokeless tobacco: Never Used  Alcohol use No  
   Comment: NOT IN 2 YEARS  
 Drug use: No  
 Sexual activity: Yes  
  Partners: Female Other Topics Concern  Not on file Social History Narrative ALLERGIES: Pcn [penicillins] Review of Systems Constitutional: Negative for fever. Gastrointestinal: Negative for abdominal pain, bowel incontinence, nausea and vomiting. Genitourinary: Negative for hematuria. Musculoskeletal: Negative for extremity weakness. Neurological: Negative for dizziness, tingling, tremors, loss of consciousness, syncope, weakness, light-headedness, numbness and headaches. All other systems reviewed and are negative. Vitals:  
 09/22/18 1815 09/22/18 1842 BP: (!) 165/94 159/79 Pulse: 94 90 Resp: 18 18 Temp: 98.1 °F (36.7 °C) SpO2: 99% 97% Weight: 86.2 kg (190 lb) Height: 5' 6\" (1.676 m) Physical Exam  
Constitutional: He is oriented to person, place, and time. He appears well-developed and well-nourished. No distress. HENT:  
Head: Normocephalic. Right Ear: External ear normal.  
Left Ear: External ear normal.  
Nose: Nose normal.  
Mouth/Throat: Oropharynx is clear and moist. No oropharyngeal exudate. 1 cm laceration oriented vertically on the bridge of the nose Eyes: Conjunctivae and EOM are normal. Pupils are equal, round, and reactive to light. Neck: Normal range of motion. Neck supple. No cervical spine tenderness Cardiovascular: Normal rate, regular rhythm and normal heart sounds. Pulmonary/Chest: Effort normal and breath sounds normal.  
Abdominal: Soft. Bowel sounds are normal.  
Musculoskeletal: Normal range of motion. He exhibits no edema, tenderness or deformity. Abrasion is noted to the right knee no deformities or effusion is noted. The patient is able ambulate without difficulty.   There is a partial avulsion of the fingernail of the left ring finger no deformities are noted to the finger or digit no neurovascular deficits are noted and no tendon deficits are noted at the nail bed is intact. Neurological: He is alert and oriented to person, place, and time. Skin: Skin is warm and dry. Laceration noted. Psychiatric: He has a normal mood and affect. His behavior is normal.  
Nursing note and vitals reviewed. MDM Number of Diagnoses or Management Options Laceration of nose, initial encounter:  
Multiple abrasions:  
Risk of Complications, Morbidity, and/or Mortality Presenting problems: low Diagnostic procedures: minimal 
Management options: low Patient Progress Patient progress: stable ED Course Wound Repair 
Date/Time: 9/22/2018 6:55 PM 
Performed by: attendingPreparation: skin prepped with Charles Pre-procedure re-eval: Immediately prior to the procedure, the patient was reevaluated and found suitable for the planned procedure and any planned medications. Time out: Immediately prior to the procedure a time out was called to verify the correct patient, procedure, equipment, staff and marking as appropriate. [facility] Location details: nose Wound length:2.5 cm or less Foreign bodies: no foreign bodies Irrigation method: freddie-clens. Debridement: none Skin closure: glue Approximation: close Patient tolerance: Patient tolerated the procedure well with no immediate complications My total time at bedside, performing this procedure was 1-15 minutes.

## 2018-11-02 PROBLEM — E11.21 TYPE 2 DIABETES WITH NEPHROPATHY (HCC): Status: ACTIVE | Noted: 2018-11-02

## 2019-01-01 ENCOUNTER — APPOINTMENT (OUTPATIENT)
Dept: GENERAL RADIOLOGY | Age: 79
End: 2019-01-01
Attending: ANESTHESIOLOGY
Payer: MEDICARE

## 2019-01-01 ENCOUNTER — HOSPITAL ENCOUNTER (OUTPATIENT)
Age: 79
Setting detail: OUTPATIENT SURGERY
Discharge: HOME | End: 2019-08-27
Attending: ANESTHESIOLOGY | Admitting: ANESTHESIOLOGY
Payer: MEDICARE

## 2019-01-01 VITALS
RESPIRATION RATE: 16 BRPM | WEIGHT: 177 LBS | HEART RATE: 81 BPM | SYSTOLIC BLOOD PRESSURE: 158 MMHG | BODY MASS INDEX: 28.57 KG/M2 | DIASTOLIC BLOOD PRESSURE: 72 MMHG | TEMPERATURE: 98 F | OXYGEN SATURATION: 96 %

## 2019-01-01 PROCEDURE — 74011000250 HC RX REV CODE- 250: Performed by: ANESTHESIOLOGY

## 2019-01-01 PROCEDURE — 99152 MOD SED SAME PHYS/QHP 5/>YRS: CPT

## 2019-01-01 PROCEDURE — 74011250636 HC RX REV CODE- 250/636

## 2019-01-01 PROCEDURE — 76210000063 HC OR PH I REC FIRST 0.5 HR: Performed by: ANESTHESIOLOGY

## 2019-01-01 PROCEDURE — 77030014125 HC TY EPDRL BBMI -B: Performed by: ANESTHESIOLOGY

## 2019-01-01 PROCEDURE — 74011250636 HC RX REV CODE- 250/636: Performed by: ANESTHESIOLOGY

## 2019-01-01 PROCEDURE — 76210000020 HC REC RM PH II FIRST 0.5 HR: Performed by: ANESTHESIOLOGY

## 2019-01-01 PROCEDURE — 76010000138 HC OR TIME 0.5 TO 1 HR: Performed by: ANESTHESIOLOGY

## 2019-01-01 PROCEDURE — 77030037529: Performed by: ANESTHESIOLOGY

## 2019-01-01 RX ORDER — EZETIMIBE 10 MG/1
TABLET ORAL DAILY
COMMUNITY

## 2019-01-01 RX ORDER — FENTANYL CITRATE 50 UG/ML
INJECTION, SOLUTION INTRAMUSCULAR; INTRAVENOUS AS NEEDED
Status: DISCONTINUED | OUTPATIENT
Start: 2019-01-01 | End: 2019-01-01 | Stop reason: HOSPADM

## 2019-01-01 RX ORDER — CARVEDILOL 12.5 MG/1
12.5 TABLET ORAL 2 TIMES DAILY WITH MEALS
COMMUNITY
End: 2020-01-01

## 2019-01-01 RX ORDER — MIDAZOLAM HYDROCHLORIDE 1 MG/ML
INJECTION, SOLUTION INTRAMUSCULAR; INTRAVENOUS AS NEEDED
Status: DISCONTINUED | OUTPATIENT
Start: 2019-01-01 | End: 2019-01-01 | Stop reason: HOSPADM

## 2019-01-01 RX ORDER — SPIRONOLACTONE 25 MG/1
25 TABLET ORAL DAILY
COMMUNITY
End: 2020-01-01

## 2019-01-01 RX ORDER — LIDOCAINE HYDROCHLORIDE 20 MG/ML
INJECTION, SOLUTION INFILTRATION; PERINEURAL AS NEEDED
Status: DISCONTINUED | OUTPATIENT
Start: 2019-01-01 | End: 2019-01-01 | Stop reason: HOSPADM

## 2019-01-09 ENCOUNTER — APPOINTMENT (RX ONLY)
Dept: URBAN - METROPOLITAN AREA CLINIC 349 | Facility: CLINIC | Age: 79
Setting detail: DERMATOLOGY
End: 2019-01-09

## 2019-01-09 DIAGNOSIS — L81.4 OTHER MELANIN HYPERPIGMENTATION: ICD-10-CM

## 2019-01-09 DIAGNOSIS — L82.1 OTHER SEBORRHEIC KERATOSIS: ICD-10-CM

## 2019-01-09 DIAGNOSIS — D22 MELANOCYTIC NEVI: ICD-10-CM

## 2019-01-09 DIAGNOSIS — K13.0 DISEASES OF LIPS: ICD-10-CM

## 2019-01-09 PROBLEM — K21.9 GASTRO-ESOPHAGEAL REFLUX DISEASE WITHOUT ESOPHAGITIS: Status: ACTIVE | Noted: 2019-01-09

## 2019-01-09 PROBLEM — E78.5 HYPERLIPIDEMIA, UNSPECIFIED: Status: ACTIVE | Noted: 2019-01-09

## 2019-01-09 PROBLEM — D22.5 MELANOCYTIC NEVI OF TRUNK: Status: ACTIVE | Noted: 2019-01-09

## 2019-01-09 PROBLEM — L20.84 INTRINSIC (ALLERGIC) ECZEMA: Status: ACTIVE | Noted: 2019-01-09

## 2019-01-09 PROBLEM — Z92.3 PERSONAL HISTORY OF IRRADIATION: Status: ACTIVE | Noted: 2019-01-09

## 2019-01-09 PROCEDURE — ? COUNSELING

## 2019-01-09 PROCEDURE — ? PRESCRIPTION

## 2019-01-09 PROCEDURE — A4550 SURGICAL TRAYS: HCPCS

## 2019-01-09 PROCEDURE — 11301 SHAVE SKIN LESION 0.6-1.0 CM: CPT

## 2019-01-09 PROCEDURE — 99203 OFFICE O/P NEW LOW 30 MIN: CPT | Mod: 25

## 2019-01-09 PROCEDURE — ? PATHOLOGY BILLING

## 2019-01-09 PROCEDURE — ? SHAVE REMOVAL

## 2019-01-09 PROCEDURE — 88305 TISSUE EXAM BY PATHOLOGIST: CPT

## 2019-01-09 RX ORDER — IODOQUINOL, HYDROCORTISONE ACETATE AND ALOE VERA LEAF 10; 20; 10 MG/G; MG/G; MG/G
GEL TOPICAL
Qty: 1 | Refills: 2 | Status: ERX | COMMUNITY
Start: 2019-01-09

## 2019-01-09 RX ADMIN — IODOQUINOL, HYDROCORTISONE ACETATE AND ALOE VERA LEAF: 10; 20; 10 GEL TOPICAL at 16:32

## 2019-01-09 ASSESSMENT — LOCATION SIMPLE DESCRIPTION DERM
LOCATION SIMPLE: LEFT CHEEK
LOCATION SIMPLE: RIGHT LOWER BACK
LOCATION SIMPLE: LEFT UPPER BACK
LOCATION SIMPLE: RIGHT LOWER BACK
LOCATION SIMPLE: RIGHT LIP

## 2019-01-09 ASSESSMENT — LOCATION ZONE DERM
LOCATION ZONE: LIP
LOCATION ZONE: FACE
LOCATION ZONE: TRUNK
LOCATION ZONE: TRUNK

## 2019-01-09 ASSESSMENT — LOCATION DETAILED DESCRIPTION DERM
LOCATION DETAILED: RIGHT INFERIOR LATERAL MIDBACK
LOCATION DETAILED: LEFT MEDIAL UPPER BACK
LOCATION DETAILED: LEFT SUPERIOR MEDIAL BUCCAL CHEEK
LOCATION DETAILED: RIGHT ORAL COMMISSURE
LOCATION DETAILED: RIGHT SUPERIOR LATERAL MIDBACK

## 2019-01-09 NOTE — PROCEDURE: SHAVE REMOVAL
Notification Instructions: Patient will be notified of biopsy results. However, patient instructed to call the office if not contacted within 2 weeks.
Size Of Lesion In Cm (Required): 0.6
Billing Type: Third-Party Bill
Biopsy Method: Ángel easley
Accession #: [id number]
Anesthesia Type: 1% lidocaine with epinephrine and a 1:10 solution of 8.4% sodium bicarbonate
Post-Care Instructions: I reviewed with the patient in detail post-care instructions. Patient is to keep the biopsy site dry overnight, and then apply bacitracin twice daily until healed. Patient may apply hydrogen peroxide soaks to remove any crusting.
X Size Of Lesion In Cm (Optional): 0
Wound Care: Vaseline
Anesthesia Volume In Cc: 0.5
Medical Necessity Clause: This procedure was medically necessary because the lesion that was treated was: irritated and two tone color
Medical Necessity Information: It is in your best interest to select a reason for this procedure from the list below. All of these items fulfill various CMS LCD requirements except the new and changing color options.
Hemostasis: Aluminum Chloride
Bill 74380 For Specimen Handling/Conveyance To Laboratory?: no
Bill For Surgical Tray: yes
Detail Level: Detailed
Consent was obtained from the patient. The risks and benefits to therapy were discussed in detail. Specifically, the risks of infection, scarring, bleeding, prolonged wound healing, incomplete removal, allergy to anesthesia, nerve injury and recurrence were addressed. Prior to the procedure, the treatment site was clearly identified and confirmed by the patient. All components of Universal Protocol/PAUSE Rule completed.

## 2019-01-09 NOTE — PROCEDURE: PATHOLOGY BILLING
Immunohistochemistry (86137 and 69992) billing is not performed here. Please use the Immunohistochemistry Stain Billing plan to accomplish this. Immunohistochemistry (77489 and 54172) billing is not performed here. Please use the Immunohistochemistry Stain Billing plan to accomplish this.

## 2019-04-10 ENCOUNTER — APPOINTMENT (RX ONLY)
Dept: URBAN - METROPOLITAN AREA CLINIC 349 | Facility: CLINIC | Age: 79
Setting detail: DERMATOLOGY
End: 2019-04-10

## 2019-04-10 DIAGNOSIS — K13.0 DISEASES OF LIPS: ICD-10-CM | Status: UNCHANGED

## 2019-04-10 DIAGNOSIS — D22 MELANOCYTIC NEVI: ICD-10-CM | Status: RESOLVED

## 2019-04-10 PROBLEM — F41.9 ANXIETY DISORDER, UNSPECIFIED: Status: ACTIVE | Noted: 2019-04-10

## 2019-04-10 PROBLEM — E13.9 OTHER SPECIFIED DIABETES MELLITUS WITHOUT COMPLICATIONS: Status: ACTIVE | Noted: 2019-04-10

## 2019-04-10 PROBLEM — Z85.118 PERSONAL HISTORY OF OTHER MALIGNANT NEOPLASM OF BRONCHUS AND LUNG: Status: ACTIVE | Noted: 2019-04-10

## 2019-04-10 PROBLEM — L85.3 XEROSIS CUTIS: Status: ACTIVE | Noted: 2019-04-10

## 2019-04-10 PROBLEM — F32.9 MAJOR DEPRESSIVE DISORDER, SINGLE EPISODE, UNSPECIFIED: Status: ACTIVE | Noted: 2019-04-10

## 2019-04-10 PROBLEM — D22.9 MELANOCYTIC NEVI, UNSPECIFIED: Status: ACTIVE | Noted: 2019-04-10

## 2019-04-10 PROBLEM — J44.9 CHRONIC OBSTRUCTIVE PULMONARY DISEASE, UNSPECIFIED: Status: ACTIVE | Noted: 2019-04-10

## 2019-04-10 PROBLEM — I10 ESSENTIAL (PRIMARY) HYPERTENSION: Status: ACTIVE | Noted: 2019-04-10

## 2019-04-10 PROCEDURE — ? TREATMENT REGIMEN

## 2019-04-10 PROCEDURE — ? COUNSELING

## 2019-04-10 PROCEDURE — 99213 OFFICE O/P EST LOW 20 MIN: CPT

## 2019-04-10 PROCEDURE — ? PRESCRIPTION

## 2019-04-10 RX ORDER — AMLODIPINE BESYLATE 2.5 MG/1
TABLET ORAL
Qty: 1 | Refills: 0 | Status: ERX | COMMUNITY
Start: 2019-04-10

## 2019-04-10 RX ADMIN — AMLODIPINE BESYLATE: 2.5 TABLET ORAL at 14:16

## 2019-04-10 ASSESSMENT — LOCATION ZONE DERM
LOCATION ZONE: LIP
LOCATION ZONE: FACE

## 2019-04-10 ASSESSMENT — LOCATION DETAILED DESCRIPTION DERM
LOCATION DETAILED: LEFT SUPERIOR MEDIAL BUCCAL CHEEK
LOCATION DETAILED: RIGHT ORAL COMMISSURE

## 2019-04-10 ASSESSMENT — LOCATION SIMPLE DESCRIPTION DERM
LOCATION SIMPLE: LEFT CHEEK
LOCATION SIMPLE: RIGHT LIP

## 2019-04-10 NOTE — PROCEDURE: TREATMENT REGIMEN
Discontinue Regimen: Stop Alcortin gel
Detail Level: Detailed
Initiate Treatment: Start hydrocortisone valerate twice daily for two weeks
Plan: Start new topical, apply two weeks and if no better call the office. Patient was also informed to contact the office if the prescription is to much

## 2019-05-22 ENCOUNTER — RX ONLY (OUTPATIENT)
Age: 79
Setting detail: RX ONLY
End: 2019-05-22

## 2019-05-22 RX ORDER — KETOCONAZOLE 20 MG/G
CREAM TOPICAL
Qty: 1 | Refills: 0 | Status: ERX | COMMUNITY
Start: 2019-05-22

## 2019-05-27 NOTE — H&P
[address] [place] [address] 
HISTORY AND PHYSICAL Name:  Willie Mckenzie 
MR#:  [id number] :  1940 ACCOUNT #:  [de-identified] ADMIT DATE:  2019 HISTORY OF PRESENT ILLNESS:  The patient is a pleasant 24-year-old white male with chronic pain in both knees. She underwent diagnostic right genicular nerve blocks several weeks ago with excellent results and he presents for COOLIEF to the genicular nerves of his left knee at [place] [address]. PAST MEDICAL HISTORY: 
1. Hypercholesterolemia. 2.  Generalized osteoarthritis. 3.  Coronary artery disease. 4.  Diabetes. 5.  History of stomach cancer. 6.  COPD. 7.  GERD. 8.  Chronic depression. 9. BPH. 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 MEDICATIONS: 
1. Aspirin. 2.  Atorvastatin. 3.  Bupropion XL. 4.  Coreg. 5.  Flomax. 6.  Isosorbide. 7.  Lantus insulin. 8.  Losartan. 9.  Plavix. 10.  ProAir. 11.  Proscar. 12.  Protonix. 13.  Ranexa. 14.  Spiriva. 15.  Ultracet. ALLERGIES:  PENICILLIN. SOCIAL HISTORY:  Denies smoking or alcohol abuse. FAMILY HISTORY:  Noncontributory. REVIEW OF SYSTEMS:  Noncontributory. PHYSICAL EXAMINATION: 
GENERAL APPEARANCE:  Very pleasant middle-aged elderly white male sitting in a chair in no acute distress. VITAL SIGNS:  Afebrile. Vital signs stable. CHEST:  Clear. HEART:  Regular rate and rhythm without murmur. ABDOMEN:  Soft, positive bowel sounds. Nontender. No masses. EXTREMITIES:  No clubbing, cyanosis or edema. MUSCULOSKELETAL:  Reveals tenderness over the knees on manipulation, right greater than left. NEUROLOGIC:  Grossly normal. 
 
SUMMARY:  This is a very pleasant 24-year-old gentleman with chronic pain in both knees who presents for COOLIEF to the genicular nerves of his right knee tomorrow at [place] [address]. ASSESSMENT:  Chronic pain in both knees secondary to degenerative joint disease. PLAN:  The patient will be taken to the operating room tomorrow for COOLIEF to the genicular nerves of his right knee at [facility]. Clement Neumann MD 
 
 
[id number] 
D:  05/27/2019 13:23 
T:  05/27/2019 13:27 
JOB #:  [id number]

## 2019-05-28 ENCOUNTER — APPOINTMENT (OUTPATIENT)
Dept: GENERAL RADIOLOGY | Age: 79
End: 2019-05-28
Attending: ANESTHESIOLOGY
Payer: MEDICARE

## 2019-05-28 ENCOUNTER — HOSPITAL ENCOUNTER (OUTPATIENT)
Age: 79
Setting detail: OUTPATIENT SURGERY
Discharge: HOME | End: 2019-05-28
Attending: ANESTHESIOLOGY | Admitting: ANESTHESIOLOGY
Payer: MEDICARE

## 2019-05-28 VITALS
TEMPERATURE: 98.6 F | BODY MASS INDEX: 27.28 KG/M2 | SYSTOLIC BLOOD PRESSURE: 152 MMHG | WEIGHT: 169 LBS | DIASTOLIC BLOOD PRESSURE: 71 MMHG | HEART RATE: 66 BPM | OXYGEN SATURATION: 94 % | RESPIRATION RATE: 16 BRPM

## 2019-05-28 PROCEDURE — 76210000020 HC REC RM PH II FIRST 0.5 HR: Performed by: ANESTHESIOLOGY

## 2019-05-28 PROCEDURE — 77030014125 HC TY EPDRL BBMI -B: Performed by: ANESTHESIOLOGY

## 2019-05-28 PROCEDURE — 74011000250 HC RX REV CODE- 250: Performed by: ANESTHESIOLOGY

## 2019-05-28 PROCEDURE — 77030037530: Performed by: ANESTHESIOLOGY

## 2019-05-28 PROCEDURE — 74011250636 HC RX REV CODE- 250/636: Performed by: ANESTHESIOLOGY

## 2019-05-28 PROCEDURE — 74011250636 HC RX REV CODE- 250/636

## 2019-05-28 PROCEDURE — 76210000063 HC OR PH I REC FIRST 0.5 HR: Performed by: ANESTHESIOLOGY

## 2019-05-28 PROCEDURE — 99152 MOD SED SAME PHYS/QHP 5/>YRS: CPT

## 2019-05-28 PROCEDURE — 76010000154 HC OR TIME FIRST 0.5 HR: Performed by: ANESTHESIOLOGY

## 2019-05-28 RX ORDER — FENTANYL CITRATE 50 UG/ML
INJECTION, SOLUTION INTRAMUSCULAR; INTRAVENOUS AS NEEDED
Status: DISCONTINUED | OUTPATIENT
Start: 2019-05-28 | End: 2019-05-28 | Stop reason: HOSPADM

## 2019-05-28 RX ORDER — LIDOCAINE HYDROCHLORIDE AND EPINEPHRINE 10; 10 MG/ML; UG/ML
INJECTION, SOLUTION INFILTRATION; PERINEURAL AS NEEDED
Status: DISCONTINUED | OUTPATIENT
Start: 2019-05-28 | End: 2019-05-28 | Stop reason: HOSPADM

## 2019-05-28 RX ORDER — MIDAZOLAM HYDROCHLORIDE 1 MG/ML
INJECTION, SOLUTION INTRAMUSCULAR; INTRAVENOUS AS NEEDED
Status: DISCONTINUED | OUTPATIENT
Start: 2019-05-28 | End: 2019-05-28 | Stop reason: HOSPADM

## 2019-05-28 NOTE — DISCHARGE INSTRUCTIONS
Pain Management Aftercare    Common Side Effects that may last 8-12 hours:  Drowsiness  Slurred speech  Dizziness  Poor balance  Blurred vision     Hangover effect (headache, upset stomach, etc.)    Aftercare Instructions: You must have a responsible adult drive you home. Do not drive a car or operate equipment for at least 12 hours. Do not take any new medications for at least 24 hours unless your doctor has prescribed them and he is aware that you are taking them. Do not drink any alcoholic beverages until the next day. Advance to your normal diet as tolerated. Expect soreness at the injection site that will improve over the next 24 hours. Avoid strenuous exercise or heavy lifting. Pre-injection activities may be resumed tomorrow (unless instructed otherwise by your doctor). Notify your doctor immediately if any of the following symptoms occur:  Severe pain at the injection site  Bleeding or drainage from injection site  Fever 101 degrees F or greater  New or increased weakness/numbness of extremities that does not resolve  Severe headache that disappears or gets better when you lie down  Breathing difficulty  Skin rash  Vomiting  Seizures  Unusual drowsiness    Doctor's Phone Number: [phone]    Follow-up Care:      DISCHARGE SUMMARY from Nurse    PATIENT INSTRUCTIONS:    After general anesthesia or intravenous sedation, for 24 hours or while taking prescription Narcotics:  · Limit your activities  · Do not drive and operate hazardous machinery  · Do not make important personal or business decisions  · Do  not drink alcoholic beverages  · If you have not urinated within 8 hours after discharge, please contact your surgeon on call. *  Please give a list of your current medications to your Primary Care Provider. *  Please update this list whenever your medications are discontinued, doses are      changed, or new medications (including over-the-counter products) are added.     *  Please carry medication information at all times in case of emergency situations. These are general instructions for a healthy lifestyle:    No smoking/ No tobacco products/ Avoid exposure to second hand smoke    Surgeon General's Warning:  Quitting smoking now greatly reduces serious risk to your health. Obesity, smoking, and sedentary lifestyle greatly increases your risk for illness    A healthy diet, regular physical exercise & weight monitoring are important for maintaining a healthy lifestyle    You may be retaining fluid if you have a history of heart failure or if you experience any of the following symptoms:  Weight gain of 3 pounds or more overnight or 5 pounds in a week, increased swelling in our hands or feet or shortness of breath while lying flat in bed. Please call your doctor as soon as you notice any of these symptoms; do not wait until your next office visit. Recognize signs and symptoms of STROKE:    F-face looks uneven    A-arms unable to move or move unevenly    S-speech slurred or non-existent    T-time-call 911 as soon as signs and symptoms begin-DO NOT go       Back to bed or wait to see if you get better-TIME IS BRAIN.

## 2019-05-28 NOTE — BRIEF OP NOTE
BRIEF OPERATIVE NOTE Date of Procedure: 5/28/2019 Preoperative Diagnosis: Right knee pain, unspecified chronicity [M25.561] Postoperative Diagnosis: Right knee pain, unspecified chronicity [M25.561] Procedure(s): RIGHT KNEE COOLIEF Surgeon(s) and Role: 
   * eJannie Whipple MD - Primary Surgical Assistant: none Surgical Staff: 
Circ-1: Sonia Davidson RN Local Nurse Monitor: Dena Caldwell RN Radiology Technician: Cesar Brady RT, R, CT Scrub Tech-1: Rg Davis Event Time In Time Out Incision Start 4501 Incision Close 0907 Anesthesia: Con-Sed  
Estimated Blood Loss: none Specimens: * No specimens in log * Findings: none Complications: none Implants: * No implants in log *

## 2019-05-28 NOTE — OP NOTES
[facility] 
OPERATIVE REPORT Name:  Beatrice Torres 
MR#:  [id number] :  1940 ACCOUNT #:  [de-identified] DATE OF SERVICE:  2019 PREOPERATIVE DIAGNOSIS:  Chronic pain in both knees secondary to degenerative joint disease. POSTOPERATIVE DIAGNOSIS:  Chronic pain in both knees secondary to degenerative joint disease. PROCEDURE PERFORMED: 
1.  COOLIEF to the right superior medial genicular nerve. 2.  COOLIEF to the right superior lateral genicular nerve. 3.  COOLIEF to the right inferior medial genicular nerve. 4.  Fluoroscopy. 5.  IV sedation. SURGEON:  Alexia Mccormick MD 
 
ASSISTANT:  none ANESTHESIA:  Local with IV sedation. COMPLICATIONS:  None. CONDITION:  Stable. SPECIMENS REMOVED:  none IMPLANTS: none. ESTIMATED BLOOD LOSS:  None. FLUIDS:  None. INDICATIONS:  The patient is a 53-year-old gentleman with chronic pain in both knees due to severe degenerative arthritis. He presents today for COOLIEF to the genicular nerves of his right knee at [facility]. PROCEDURE:  After informed consent was obtained, a 22-gauge IV was placed in the right arm and the patient was taken to the operating room and positioned supine. Monitors were applied. Vital signs were stable. The right knee was prepped and draped in the usual sterile fashion. A small skin wheal was made over the right superior medial aspect of the knee using 1% lidocaine and a 7.5-cm radiofrequency needle with a 5.5-mm active tip was advanced until contact was made with bone. The needle was advanced off the bone to a distance custodial the diaphysis of the distal femur and views were confirmed in AP and lateral fluoroscopy. Sensory stimulation at 50 Hz up to 3 volts produced pain at less than 1 volt. Motor stimulation at 2 Hz up to 3 volts produced no movement of the right leg or foot. Next, 2 mL of 1% lidocaine were injected.   After 2 minutes, radiofrequency lesioning was carried out at 60 degrees Celsius for 2 minutes and 30 seconds. This same procedure was repeated over the right superior lateral aspect of the knee and the right inferior medial aspect of the knee. At each level, sensory stimulation at 50 Hz up to 3 volts produced pain at less than 1.5 volts and motor stimulation at 2 Hz up to 3 volts produced no movement of the right leg or foot. At each level, radiofrequency lesioning was carried out at 60 degrees Celsius for 2 minutes and 30 seconds. He tolerated the entire procedure well. There were no complications. All fluoroscopic views were interpreted by me. He was transferred to the recovery room in satisfactory condition. PLAN:  I will see him back in 3-4 weeks for followup in my office. If he does well with today's procedure, I plan to repeat the procedure on the left side. Dede Suero MD 
 
 
[id number] 
D:  05/28/2019 9:12 
T:  05/28/2019 9:18 JOB #:  [id number] CC:  Marc Coe MD

## 2019-06-14 ENCOUNTER — HOSPITAL ENCOUNTER (OUTPATIENT)
Dept: GENERAL RADIOLOGY | Age: 79
Discharge: HOME | End: 2019-06-14
Attending: INTERNAL MEDICINE
Payer: MEDICARE

## 2019-06-14 DIAGNOSIS — R63.4 WEIGHT LOSS: ICD-10-CM

## 2019-06-14 DIAGNOSIS — D64.9 NORMOCYTIC ANEMIA: ICD-10-CM

## 2019-06-14 PROCEDURE — 74241 XR UPPER GI SERIES W KUB: CPT

## 2019-06-14 PROCEDURE — 74011000250 HC RX REV CODE- 250: Performed by: INTERNAL MEDICINE

## 2019-06-14 PROCEDURE — 74011000255 HC RX REV CODE- 255: Performed by: INTERNAL MEDICINE

## 2019-06-14 RX ADMIN — BARIUM SULFATE 150 ML: 0.6 SUSPENSION ORAL at 09:30

## 2019-06-14 RX ADMIN — ANTACID/ANTIFLATULENT 4 G: 380; 550; 10; 10 GRANULE, EFFERVESCENT ORAL at 09:31

## 2019-06-14 RX ADMIN — BARIUM SULFATE 135 ML: 980 POWDER, FOR SUSPENSION ORAL at 09:31

## 2019-06-14 RX ADMIN — BARIUM SULFATE 700 MG: 700 TABLET ORAL at 09:30

## 2019-07-13 ENCOUNTER — HOSPITAL ENCOUNTER (OUTPATIENT)
Dept: CT IMAGING | Age: 79
Discharge: HOME | End: 2019-07-13
Attending: INTERNAL MEDICINE
Payer: MEDICARE

## 2019-07-13 DIAGNOSIS — R63.4 WEIGHT LOSS: ICD-10-CM

## 2019-07-13 LAB — CREAT BLD-MCNC: 1.1 MG/DL (ref 0.8–1.5)

## 2019-07-13 PROCEDURE — 82565 ASSAY OF CREATININE: CPT

## 2019-07-13 PROCEDURE — 74011636320 HC RX REV CODE- 636/320: Performed by: INTERNAL MEDICINE

## 2019-07-13 PROCEDURE — 74011000258 HC RX REV CODE- 258: Performed by: INTERNAL MEDICINE

## 2019-07-13 PROCEDURE — 74177 CT ABD & PELVIS W/CONTRAST: CPT

## 2019-07-13 RX ORDER — SODIUM CHLORIDE 0.9 % (FLUSH) 0.9 %
10 SYRINGE (ML) INJECTION
Status: COMPLETED | OUTPATIENT
Start: 2019-07-13 | End: 2019-07-13

## 2019-07-13 RX ADMIN — IOPAMIDOL 100 ML: 755 INJECTION, SOLUTION INTRAVENOUS at 09:56

## 2019-07-13 RX ADMIN — SODIUM CHLORIDE 100 ML: 900 INJECTION, SOLUTION INTRAVENOUS at 09:56

## 2019-07-13 RX ADMIN — Medication 10 ML: at 09:56

## 2019-08-27 NOTE — PROCEDURES
[facility]  PROCEDURE NOTE    Name:  Tunde Snell  MR#:  [id number]  :  1940  ACCOUNT #:  [de-identified]  DATE OF SERVICE:  2019    PREOPERATIVE DIAGNOSIS:  Left knee pain secondary to severe degenerative joint disease. POSTOPERATIVE DIAGNOSIS:  Left knee pain secondary to severe degenerative joint disease. PROCEDURE PERFORMED:  1.  Radiofrequency ablation to the left superior medial genicular nerve. 2.  Radiofrequency ablation to the left superior lateral genicular nerve. 3.  Radiofrequency ablation to the left inferior medial genicular nerve. 4.  Fluoroscopy. 5.  IV sedation. SURGEON:  Kallie Hannah MD    ASSISTANT:  Local with IV sedation. ANESTHESIA:  none    ESTIMATED BLOOD LOSS:  None. FLUIDS:  None. SPECIMENS REMOVED:  none    COMPLICATIONS:  None. CONDITION:  Stable. IMPLANTS:  none    INDICATION:  The patient is a 74-year-old gentleman with severe left knee pain. He underwent the COOLIEF procedure to his right knee and has had excellent results. He presents today for the same procedure on the left side. PROCEDURE:  After informed consent was obtained, a 22-gauge IV was placed in the right arm. The patient was taken to the operating room and positioned supine. Monitors were applied. Vital signs were stable. The left knee was prepped and draped in the usual sterile fashion. A small skin wheal was made over the left superior medial aspect of the knee using 1% lidocaine and a 7.5-cm radiofrequency needle with a 5-mm active tip was advanced until contact was made with bone. The needle was advanced to a distance FCI the diaphysis of the distal femur. Views were confirmed in AP and lateral fluoroscopy. Sensory stimulation at 50 Hz up to 3 volts produced pain at less than 1 volt. Motor stimulation at 2 Hz up to 3 volts produced no movement of the left leg or foot. Next, 2 mL of 2% lidocaine were injected.   After 2 minutes, radiofrequency lesioning was carried out at 60 degrees Celsius for 2 minutes and 30 seconds. This same procedure was repeated over the left superior lateral aspect of the knee and the left inferior medial aspect of the knee. At each level, sensory stimulation at 50 Hz up to 3 volts produced pain at less than 1 volt and motor stimulation at 2 Hz up to 3 volts produced no movement of the left leg or foot. At each level, radiofrequency lesioning was carried out at 60 degrees Celsius for 2 minutes and 30 seconds. He tolerated the entire procedure well. There were no complications. He was transferred to the recovery room in satisfactory condition. All fluoroscopic views were interpreted by me. PLAN:  I recommended continuing his current medications. I will see him back on 09/17/2019 for followup in our office. He may restart Plavix later today.       Paty Roman MD      [id number]  D:  08/27/2019 8:21  T:  08/27/2019 8:26  JOB #:  [id number]

## 2019-08-27 NOTE — H&P
[address] [place] [address]  HISTORY AND PHYSICAL    Name:  Jay Ayala  MR#:  [id number]  :  1940  ACCOUNT #:  [de-identified]  ADMIT DATE:  2019      DATE OF SURGERY:  2019    HISTORY OF PRESENT ILLNESS:  The patient is a 72-year-old gentleman with severe left knee pain. He underwent diagnostic genicular nerve blocks with excellent results and he presents for COOLIEF to the genicular nerves of his left knee at [facility]. PAST MEDICAL HISTORY:  1. Hypercholesterolemia. 2.  Coronary artery disease. 3.  Insulin-dependent diabetes. 4.  History of COPD. 5.  BPH. 6.  Gastroesophageal reflux disease. 7.  Stomach cancer. 8.  Chronic depression. 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 MEDICATIONS:  1. Aspirin. 2.  Atorvastatin. 3.  Coreg. 4.  Bupropion. 5.  Flomax. 6.  Plavix. 7.  Losartan. 8.  Lantus insulin. 9.   Isosorbide. 10.  ProAir. 11.  Ultracet. 12.  Proscar. 13.  Protonix. 14.  Ranexa. 15.  Spiriva. ALLERGIES:  PENICILLIN. SOCIAL HISTORY:  Denies smoking or alcohol abuse. FAMILY HISTORY:  Noncontributory. REVIEW OF SYSTEMS:  Noncontributory. PHYSICAL EXAMINATION:  GENERAL APPEARANCE:  Very pleasant elderly gentleman sitting in a chair in no acute distress. VITAL SIGNS:  Afebrile. Vital signs stable. CHEST:  Clear. HEART:  Regular rate and rhythm. ABDOMEN:  Benign. EXTREMITIES:  No clubbing, cyanosis or edema. MUSCULOSKELETAL:  Reveals pain on manipulation of the left knee. NEUROLOGIC:  Exam is grossly normal.    SUMMARY:  This is a pleasant 72-year-old gentleman with severe left knee pain due to degenerative arthritis who presents for COOLIEF to the genicular nerves of his left knee at [facility]. ASSESSMENT:  Severe degenerative joint disease of the left knee.     PLAN:  The patient will be taken to the operating room tomorrow for radiofrequency ablation to the genicular nerves of his left knee at [facility].       Jeane Hicks MD      [id number]  D:  08/26/2019 17:32  T:  08/26/2019 17:35  JOB #:  [id number]

## 2019-08-27 NOTE — DISCHARGE INSTRUCTIONS
Pain Management Aftercare: Left Radiofreqency Ablation    Common Side Effects that may last 8-12 hours:  Drowsiness  Slurred speech  Dizziness  Poor balance  Blurred vision     Hangover effect (headache, upset stomach, etc.)    Aftercare Instructions:  Elevate left leg and apply ice pack as needed  You must have a responsible adult drive you home. Do not drive a car or operate equipment for at least 12 hours. Do not take any new medications for at least 24 hours unless your doctor has prescribed them and he is aware that you are taking them. Do not drink any alcoholic beverages until the next day. Advance to your normal diet as tolerated. Expect soreness at the injection site that will improve over the next 24 hours. Avoid strenuous exercise or heavy lifting. Pre-injection activities may be resumed tomorrow (unless instructed otherwise by your doctor). Notify your doctor immediately if any of the following symptoms occur:  Severe pain at the injection site  Bleeding or drainage from injection site  Fever 101 degrees F or greater  New or increased weakness/numbness of extremities that does not resolve  Severe headache that disappears or gets better when you lie down  Breathing difficulty  Skin rash  Vomiting  Seizures  Unusual drowsiness    Doctor's Phone Number: [phone]    Follow-up Care: September 17, 2019 at 10:30 a.m. in office       DISCHARGE SUMMARY from Nurse    PATIENT INSTRUCTIONS:    After general anesthesia or intravenous sedation, for 24 hours or while taking prescription Narcotics:  · Limit your activities  · Do not drive and operate hazardous machinery  · Do not make important personal or business decisions  · Do  not drink alcoholic beverages  · If you have not urinated within 8 hours after discharge, please contact your surgeon on call. *  Please give a list of your current medications to your Primary Care Provider.     *  Please update this list whenever your medications are discontinued, doses are      changed, or new medications (including over-the-counter products) are added. *  Please carry medication information at all times in case of emergency situations. These are general instructions for a healthy lifestyle:    No smoking/ No tobacco products/ Avoid exposure to second hand smoke    Surgeon General's Warning:  Quitting smoking now greatly reduces serious risk to your health. Obesity, smoking, and sedentary lifestyle greatly increases your risk for illness    A healthy diet, regular physical exercise & weight monitoring are important for maintaining a healthy lifestyle    You may be retaining fluid if you have a history of heart failure or if you experience any of the following symptoms:  Weight gain of 3 pounds or more overnight or 5 pounds in a week, increased swelling in our hands or feet or shortness of breath while lying flat in bed. Please call your doctor as soon as you notice any of these symptoms; do not wait until your next office visit. Recognize signs and symptoms of STROKE:    F-face looks uneven    A-arms unable to move or move unevenly    S-speech slurred or non-existent    T-time-call 911 as soon as signs and symptoms begin-DO NOT go       Back to bed or wait to see if you get better-TIME IS BRAIN.

## 2019-08-27 NOTE — BRIEF OP NOTE
BRIEF OPERATIVE NOTE    Date of Procedure: 8/27/2019   Preoperative Diagnosis: Left knee pain, unspecified chronicity [M25.562]  Postoperative Diagnosis: Left knee pain, unspecified chronicity    Procedure(s):  LEFT RADIOFREQUENCY ABLATION KNEE  Surgeon(s) and Role:     * Nicole Awad MD - Primary         Surgical Assistant: none    Surgical Staff:  Circ-1: Julio Cesar Crabtree RN  Local Nurse Monitor: Belen Gowers, RN  Radiology Technician: RT Brandan  Scrub Tech-1: Spike Gomez  Event Time In Time Out   Incision Start 1097    Incision Close 3998      Anesthesia: Con-Sed   Estimated Blood Loss: none  Specimens: * No specimens in log *   Findings: none  Complications: none  Implants: * No implants in log *

## 2020-01-01 ENCOUNTER — HOSPITAL ENCOUNTER (OUTPATIENT)
Age: 80
Setting detail: OUTPATIENT SURGERY
Discharge: HOME | End: 2020-02-04
Attending: ANESTHESIOLOGY | Admitting: ANESTHESIOLOGY
Payer: MEDICARE

## 2020-01-01 ENCOUNTER — HOSPITAL ENCOUNTER (OUTPATIENT)
Dept: GENERAL RADIOLOGY | Age: 80
Discharge: HOME | End: 2020-07-07
Attending: INTERNAL MEDICINE
Payer: MEDICARE

## 2020-01-01 ENCOUNTER — APPOINTMENT (OUTPATIENT)
Dept: GENERAL RADIOLOGY | Age: 80
End: 2020-01-01
Attending: ANESTHESIOLOGY
Payer: MEDICARE

## 2020-01-01 VITALS — WEIGHT: 160 LBS | BODY MASS INDEX: 25.11 KG/M2 | HEIGHT: 67 IN

## 2020-01-01 VITALS
TEMPERATURE: 98 F | OXYGEN SATURATION: 97 % | WEIGHT: 158 LBS | HEART RATE: 73 BPM | BODY MASS INDEX: 25.5 KG/M2 | SYSTOLIC BLOOD PRESSURE: 165 MMHG | DIASTOLIC BLOOD PRESSURE: 74 MMHG | RESPIRATION RATE: 16 BRPM

## 2020-01-01 DIAGNOSIS — R13.12 OROPHARYNGEAL DYSPHAGIA: ICD-10-CM

## 2020-01-01 PROCEDURE — 74011250636 HC RX REV CODE- 250/636: Performed by: ANESTHESIOLOGY

## 2020-01-01 PROCEDURE — 77030014125 HC TY EPDRL BBMI -B: Performed by: ANESTHESIOLOGY

## 2020-01-01 PROCEDURE — 76010000138 HC OR TIME 0.5 TO 1 HR: Performed by: ANESTHESIOLOGY

## 2020-01-01 PROCEDURE — 74011000250 HC RX REV CODE- 250: Performed by: ANESTHESIOLOGY

## 2020-01-01 PROCEDURE — 74011000255 HC RX REV CODE- 255: Performed by: INTERNAL MEDICINE

## 2020-01-01 PROCEDURE — 77030037530: Performed by: ANESTHESIOLOGY

## 2020-01-01 PROCEDURE — 74230 X-RAY XM SWLNG FUNCJ C+: CPT

## 2020-01-01 PROCEDURE — 92611 MOTION FLUOROSCOPY/SWALLOW: CPT

## 2020-01-01 PROCEDURE — 76210000021 HC REC RM PH II 0.5 TO 1 HR: Performed by: ANESTHESIOLOGY

## 2020-01-01 RX ORDER — LIDOCAINE HYDROCHLORIDE AND EPINEPHRINE 20; 10 MG/ML; UG/ML
INJECTION, SOLUTION INFILTRATION; PERINEURAL AS NEEDED
Status: DISCONTINUED | OUTPATIENT
Start: 2020-01-01 | End: 2020-01-01 | Stop reason: HOSPADM

## 2020-01-01 RX ORDER — MIDAZOLAM HYDROCHLORIDE 1 MG/ML
INJECTION, SOLUTION INTRAMUSCULAR; INTRAVENOUS AS NEEDED
Status: DISCONTINUED | OUTPATIENT
Start: 2020-01-01 | End: 2020-01-01 | Stop reason: HOSPADM

## 2020-01-01 RX ORDER — FENTANYL CITRATE 50 UG/ML
INJECTION, SOLUTION INTRAMUSCULAR; INTRAVENOUS AS NEEDED
Status: DISCONTINUED | OUTPATIENT
Start: 2020-01-01 | End: 2020-01-01 | Stop reason: HOSPADM

## 2020-01-01 RX ADMIN — BARIUM SULFATE 45 ML: 980 POWDER, FOR SUSPENSION ORAL at 09:56

## 2020-01-01 RX ADMIN — BARIUM SULFATE 15 ML: 400 PASTE ORAL at 09:56

## 2020-02-04 NOTE — DISCHARGE INSTRUCTIONS
Plavix can be started back today. Ice thirty minutes on and thirty minutes off (for the next day or two)  Take it easy today and can resume normal activities tomorrow. Pain Management Aftercare  Common Side Effects that may last 8-12 hours:  Drowsiness  Slurred speech  Dizziness  Poor balance  Blurred vision     Hangover effect (headache, upset stomach, etc.)    Aftercare Instructions: You must have a responsible adult drive you home. Do not drive a car or operate equipment for at least 12 hours. Do not take any new medications for at least 24 hours unless your doctor has prescribed them and he is aware that you are taking them. Do not drink any alcoholic beverages until the next day. Advance to your normal diet as tolerated. Expect soreness at the injection site that will improve over the next 24 hours. Avoid strenuous exercise or heavy lifting. Pre-injection activities may be resumed tomorrow (unless instructed otherwise by your doctor). Notify your doctor immediately if any of the following symptoms occur:  Severe pain at the injection site  Bleeding or drainage from injection site  Fever 101 degrees F or greater  New or increased weakness/numbness of extremities that does not resolve  Severe headache that disappears or gets better when you lie down  Breathing difficulty  Skin rash  Vomiting  Seizures  Unusual drowsiness      Follow-up Care:  DIET  · Clear liquids until no nausea or vomiting; then light diet for the first day. · Advance to regular diet on second day, unless your doctor orders otherwise. · If nausea and vomiting continues, call your doctor. Gabriella Titus      DISCHARGE SUMMARY from Nurse    PATIENT INSTRUCTIONS:    After general anesthesia or intravenous sedation, for 8-12 hours or while taking prescription Narcotics:  · Limit your activities  · Do not drive and operate hazardous machinery  · Do not make important personal or business decisions  · Do  not drink alcoholic beverages  · If you have not urinated within 8 hours after discharge, please contact your surgeon on call. *  Please give a list of your current medications to your Primary Care Provider. *  Please update this list whenever your medications are discontinued, doses are      changed, or new medications (including over-the-counter products) are added. *  Please carry medication information at all times in case of emergency situations. These are general instructions for a healthy lifestyle:  No smoking/ No tobacco products/ Avoid exposure to second hand smoke  Surgeon General's Warning:  Quitting smoking now greatly reduces serious risk to your health. Obesity, smoking, and sedentary lifestyle greatly increases your risk for illness  A healthy diet, regular physical exercise & weight monitoring are important for maintaining a healthy lifestyle  You may be retaining fluid if you have a history of heart failure or if you experience any of the following symptoms:  Weight gain of 3 pounds or more overnight or 5 pounds in a week, increased swelling in our hands or feet or shortness of breath while lying flat in bed. Please call your doctor as soon as you notice any of these symptoms; do not wait until your next office visit.

## 2020-02-04 NOTE — H&P
[address] [place] [address] 
HISTORY AND PHYSICAL Name:  Mona Gloria 
MR#:  [id number] :  1940 ACCOUNT #:  [de-identified] ADMIT DATE:  2020 HISTORY OF PRESENT ILLNESS:  The patient is a 61-year-old gentleman with chronic pain in both knees. He underwent COOLIEF to his left knee about 6 months ago with excellent results and he presents for COOLIEF to the genicular nerves of his right knee tomorrow at [facility]. PAST MEDICAL HISTORY: 
1. COPD. 2.  Insulin-dependent diabetes. 3.  Coronary artery disease. 4.  Hypercholesterolemia. 5.  GERD. 6.  Stomach cancer. 7. BPH. 8.  Chronic depression. 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 CURRENT MEDICATIONS: 
1. Aspirin. 2.  Atorvastatin. 3.  Coreg. 4.  Bupropion. 5.  Flomax. 6.  Plavix. 7.  Losartan. 8.  Lantus insulin. 9.  Isosorbide. 10.  Ultracet. 11.  Proscar. 12.  Protonix. 13.  Ranexa. 14.  Spiriva. ALLERGIES:  PENICILLIN. SOCIAL HISTORY:  Denies smoking or alcohol abuse. FAMILY HISTORY:  Noncontributory. REVIEW OF SYSTEMS:  Noncontributory. PHYSICAL EXAMINATION: 
GENERAL APPEARANCE:  A very pleasant elderly gentleman sitting in a chair in no acute distress. Afebrile. VITAL SIGNS:  Stable. CHEST:  Clear. HEART:  Regular rate and rhythm. ABDOMEN:  Benign. EXTREMITIES:  No clubbing, cyanosis, or edema. MUSCULOSKELETAL:  Tenderness on manipulation of the right knee. NEUROLOGIC:  Grossly normal. 
 
SUMMARY:  This is a pleasant 61-year-old gentleman with right knee pain due to degenerative arthritis, who presents for COOLIEF to the genicular nerves of his right knee tomorrow at [facility]. ASSESSMENT:  Degenerative joint disease of the right knee (severe). PLAN:  The patient will be taken to the operating room tomorrow for COOLIEF to the genicular nerves of his right knee at [facility]. Marcos Kim MD 
 
 
[id number] 
D:  02/03/2020 19:33 
T:  02/03/2020 21:03 
JOB #:  [id number]

## 2020-02-04 NOTE — OP NOTES
[address] [facility] 
OPERATIVE REPORT Name:  Lizzie Leavitt 
MR#:  [id number] :  1940 ACCOUNT #:  [de-identified] DATE OF SERVICE:  2020 PREOPERATIVE DIAGNOSIS:  Degenerative joint disease of both knees. POSTOPERATIVE DIAGNOSIS:  Degenerative joint disease of both knees. PROCEDURE PERFORMED: 
1.  COOLIEF to the right superior medial genicular nerve. 2.  COOLIEF to the right superior lateral genicular nerve. 3.  COOLIEF to the right inferior medial genicular nerve. 4.  Fluoroscopy. 5.  IV sedation. SURGEON:  Giana Gross MD 
 
ASSISTANT:  none ANESTHESIA:  Local with IV sedation. COMPLICATIONS:  None. SPECIMENS REMOVED:  none IMPLANTS:  none ESTIMATED BLOOD LOSS:  None. FLUIDS:  None. INDICATIONS:  The patient is a 77-year-old gentleman with chronic pain in both knees. He presents for COOLIEF to the genicular nerves of his right knee today at [facility]. PROCEDURE:  After informed consent was obtained, the patient was taken to the fluoroscopy suite and positioned supine. Monitors were applied and vital signs were stable. A 22-gauge IV was placed in the left arm. The right knee was prepped and draped in the usual sterile fashion. A small skin wheal was made over the right superior medial aspect of the knee using 1% lidocaine and a 7.5-cm radiofrequency needle with a 5.5-mm active tip was advanced until contact was made with bone. The needle was advanced off the bone to a distance long term the diaphysis of the distal femur. Views were confirmed in AP and lateral fluoroscopy. Sensory stimulation at 50 Hz up to 3 volts produced pain at less than 2 volts. Motor stimulation at 2 Hz up to 3 volts produced no movement of the right leg or foot. Next, 2 mL of 2% lidocaine were injected. After 2 minutes, radiofrequency lesioning was carried out at 60 degrees Celsius for 2 minutes and 30 seconds. This procedure was repeated over the right superior lateral aspect of the knee and the right inferior medial aspect of the knee. At each location, sensory stimulation at 50 Hz up to 3 volts produced discomfort at less than 2 volts and motor stimulation at 2 Hz up to 3 volts produced no movement of the right leg or foot. At each level, radiofrequency lesioning was carried out at 60 degrees Celsius for 2 minutes and 30 seconds. He tolerated the entire procedure well. There were no complications. He was transferred to the recovery room in satisfactory condition. All fluoroscopic views were interpreted by me. CONDITION:  Stable. PLAN:  I recommended continuing his current medications. I will see him back in about 3 weeks for followup in my office. Louisa Durand MD 
 
 
[id number] 
D:  02/04/2020 9:20 T:  02/04/2020 11:37 
JOB #:  [id number]

## 2020-02-04 NOTE — BRIEF OP NOTE
BRIEF OPERATIVE NOTE Date of Procedure: 2/4/2020 Preoperative Diagnosis: Right knee pain, unspecified chronicity [M25.561] Postoperative Diagnosis: Right knee pain, unspecified chronicity [M25.561] Procedure(s): RIGHT KNEE COOLIEF Surgeon(s) and Role: 
   * Jimy Abel MD - Primary Surgical Assistant: none Surgical Staff: 
Circ-1: Christie Olmedo RN Local Nurse Monitor: Nain Beyer RN Radiology Technician: RT Mila R Scrub Tech-1: Parisa Orozco Event Time In Time Out Incision Start 6155 Incision Close 0915 Anesthesia: Con-Sed  
Estimated Blood Loss: none Specimens: * No specimens in log * Findings: none Complications: none Implants: * No implants in log *

## 2020-03-02 PROBLEM — I50.23 ACUTE ON CHRONIC SYSTOLIC HEART FAILURE (HCC): Status: RESOLVED | Noted: 2019-01-01 | Resolved: 2020-01-01

## 2020-03-02 PROBLEM — D64.9 ANEMIA: Status: ACTIVE | Noted: 2019-01-01

## 2020-03-02 PROBLEM — D70.9 NEUTROPENIC FEVER (HCC): Status: ACTIVE | Noted: 2019-01-01

## 2020-03-02 PROBLEM — E87.1 HYPONATREMIA: Status: ACTIVE | Noted: 2019-01-01

## 2020-03-02 PROBLEM — D70.9 NEUTROPENIC FEVER (HCC): Status: RESOLVED | Noted: 2019-01-01 | Resolved: 2020-01-01

## 2020-03-02 PROBLEM — F33.2 SEVERE EPISODE OF RECURRENT MAJOR DEPRESSIVE DISORDER (HCC): Status: ACTIVE | Noted: 2019-01-01

## 2020-03-02 PROBLEM — N17.9 AKI (ACUTE KIDNEY INJURY) (HCC): Status: ACTIVE | Noted: 2019-01-01

## 2020-03-02 PROBLEM — R52 PAIN: Status: ACTIVE | Noted: 2019-01-01

## 2020-03-02 PROBLEM — R53.1 WEAKNESS: Status: ACTIVE | Noted: 2019-01-01

## 2020-03-02 PROBLEM — Z86.39 HISTORY OF DIABETES MELLITUS: Status: ACTIVE | Noted: 2019-01-01

## 2020-03-02 PROBLEM — R11.2 NAUSEA AND VOMITING IN ADULT: Status: ACTIVE | Noted: 2019-01-01

## 2020-03-02 PROBLEM — R78.81 BACTEREMIA: Status: ACTIVE | Noted: 2019-01-01

## 2020-03-02 PROBLEM — G93.40 ACUTE ENCEPHALOPATHY: Status: ACTIVE | Noted: 2019-01-01

## 2020-03-02 PROBLEM — R50.81 NEUTROPENIC FEVER (HCC): Status: RESOLVED | Noted: 2019-01-01 | Resolved: 2020-01-01

## 2020-03-02 PROBLEM — E66.3 OVERWEIGHT WITH BODY MASS INDEX (BMI) 25.0-29.9: Status: ACTIVE | Noted: 2019-02-20

## 2020-03-02 PROBLEM — J43.1 PANLOBULAR EMPHYSEMA (HCC): Status: ACTIVE | Noted: 2020-01-01

## 2020-03-02 PROBLEM — E86.0 DEHYDRATION: Status: ACTIVE | Noted: 2019-01-01

## 2020-03-02 PROBLEM — I50.23 ACUTE ON CHRONIC SYSTOLIC HEART FAILURE (HCC): Status: ACTIVE | Noted: 2019-01-01

## 2020-03-02 PROBLEM — Z01.818 PRE-OP EVALUATION: Status: ACTIVE | Noted: 2018-04-27

## 2020-03-02 PROBLEM — R50.81 NEUTROPENIC FEVER (HCC): Status: ACTIVE | Noted: 2019-01-01

## 2020-03-02 PROBLEM — E83.42 HYPOMAGNESEMIA: Status: ACTIVE | Noted: 2019-01-01

## 2020-04-01 PROBLEM — Z01.818 PRE-OP EVALUATION: Status: RESOLVED | Noted: 2018-04-27 | Resolved: 2020-01-01

## 2020-04-02 PROBLEM — C34.12 PRIMARY CANCER OF LEFT UPPER LOBE OF LUNG (HCC): Status: ACTIVE | Noted: 2017-08-24

## 2020-04-23 PROBLEM — E83.52 HYPERCALCEMIA: Status: ACTIVE | Noted: 2020-01-01

## 2020-04-23 PROBLEM — D50.8 IRON DEFICIENCY ANEMIA SECONDARY TO INADEQUATE DIETARY IRON INTAKE: Status: ACTIVE | Noted: 2020-01-01

## 2022-08-03 NOTE — PROGRESS NOTES
[facility]    Discharge Summary  Hospitalist    Date of Admission:  7/31/2022  Date of Discharge:  8/1/2022  6:45 PM  Discharging Provider: Jennifer Sharma PA-C    Discharge Diagnoses   Near syncope, suspect symptomatic L M1 stenosis   Hx recurrent CVA  T2DM  HTN  PERLA  HLD    History of Present Illness   Huseyin St is an 69 year old male with a history of HTN, HLD, NA, T2DM, BPH, hx recurrent CVA, hx cocaine abuse who was admitted on 7/31/2022 following an episode of dizziness, expressive aphasia, and jerky movements.   Following a dinner with his family patient stood up and felt very light headed. He sat down and was observed to have jerky movements of his limbs briefly.     Hospital Course   Huseyin St was admitted on 7/31/2022.  The following problems were addressed during his hospitalization:    Transient expressive aphasia followed by body jerks/decreased awareness- suspected symptomatic M2 stenosis   Hx recurrent CVA  Pt with hx recurrent L MCA infarcts with L M1 and MS stenosis. He was transferred to [facility] 4/2022 after acute CVA for consideration of ST-MC bypass procedure but ultimately underwent MCA balloon angioplasty. Discharged 4/18/22 on DAPT x 90 days which was recently completed.   Presents with expressive aphasia, dizziness, possible LOC.   CTA on admit shows worsened, now severe stenosis distal M1 L MCA.   MRI shows interval expected evolution of subacute/chronic infarcts L corona radiata/cntrum semiovale, anterolateral temporal lobe with minimal chronic petechial hemorrhage infarct bed.   Reports back to baseline.   --Neurology following, appreciate assistance. Plan additional one month of plavix in addition to full dose asa   --EEG obtained and unremarkable   --ECHO unchanged from prior   --LDL 11- Crestor dose reduced 40mg>20mg. Will need recheck   --PT/OT/SLP- continue outpatient therapies as prior     Orthostatic hypotension  Asymptomatic during admission  Melissa Elias. : [facility] [address]  [phone]   [phone]          OUTPATIENT PHYSICAL THERAPY:Daily Note 2017    ICD-10: Treatment Diagnosis: Repeated falls (R29.6)  Unsteadiness on feet (R26.81)  Unspecified lack of coordination (R27.9)  Precautions/Allergies:   Pcn [penicillins] Nitroglycerin with pt at all times - CP maybe every 2 weeks with strenuous walking or activity. Fall Risk Score: 10 (? 5 = High Risk)  MD Orders: Outpatient PT referral MEDICAL/REFERRING DIAGNOSIS:  history of falls   DATE OF ONSET: Few months  REFERRING PHYSICIAN: Tessa Santana MD  RETURN PHYSICIAN APPOINTMENT: unknown  DATE OF PROGRESS NOTE:    RECERTIFICATION DATE: 43     INITIAL ASSESSMENT:  Mr. Armand Pike presents with increased instance of falling, maybe 2 per month. Pt reports a fall when he was leaning over and one going down steps without lights on which is more of an accident vs balance loss but does speak to decreased sensory awareness with vision removed. Pt has multiple medical problems contributing to his deficits such as his diabetes, PAD with pain in legs walking 50 - 100', CAD with ~biweekly chest pain needing Nitroglycerin. 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 Pt show decreased command following and motor processing. Pt's biggest complaint to me are his hands - he states he can be holding something and just drop it without knowing. Pt will benefit from OT consult for this. Pt presents with gait and balance deficit and functional hip weakness. Pt will benefit from PT to maximize ability and safety with mobility    PROBLEM LIST (Impacting functional limitations):  1. Decreased Strength  2. Decreased ADL/Functional Activities  3. Decreased Transfer Abilities  4. Decreased Ambulation Ability/Technique  5.  Decreased Balance  6. Increased Pain  7. Decreased Activity Tolerance  8. Increased Fatigue  9. Increased Shortness of Breath  10. Decreased Flexibility/Joint Mobility  11. Decreased Knowledge of Precautions  12. Decreased [place] with Home Exercise Program  13. Decreased Cognition INTERVENTIONS PLANNED:  1. Balance Exercise  2. Gait Training  3. Home Exercise Program (HEP)  4. Neuromuscular Re-education/Strengthening  5. Range of Motion (ROM)  6. Therapeutic Activites  7. Therapeutic Exercise/Strengthening  8. Transfer Training  9. possible orthotic consult   TREATMENT PLAN:  Effective Dates: 1/19/17 TO 4/14/17. Frequency/Duration: 2 times a week as able for this patient. (He rarely knows his work schedule more than a couple of day in advance and can be needed to drive nearly any day of the week) for 12 weeks  GOALS: (Goals have been discussed and agreed upon with patient.)  Short-Term Functional Goals: Time Frame: 4 weeks  1. Pt will be independent with range of motion, strength and balance home exercise program.  Discharge Goals: Time Frame: 8 weeks  Pt will show increased range of motion and improved posture to allow for improved safety and ability with all functional mobility. Pt will decrease TUG score indicating more normalized gait pattern and decrease risk for falls. Pt will increase Alexis Balance Scale to 48/56 indicating increased balance and decreased risk for falls. Pt will increase Dynamic Gait Index to 19/24 indicating increase gait balance and decreased risk for falls. Pt will be able to ambulate increased community distances with least restrictive assistive device independent and safe as evidenced by increased distance on the 6 minute walk test.    Rehabilitation Potential For Stated Goals: Fair              HISTORY:   GOAL:  \"To get my fingers working better. Like to walk better. Present Symptoms:    Falling 1 - 2 months - Kind of stoop over and put a tag on the car and got right back up. though this could have certainly contributed to presenting symptoms. Degree or orthostasis improved after IVF. May also have some autonomic dysfunction.   --Recommend adequate po intake to ensure hydration  --Discharged with compression stockings  --If ongoing issues may need medication though resting BP is high.      T2DM  A1C 8.5. improved from 11.8 4/2022.   PTA: lantus 46u qpm, aspart 5-12 units tid with meals and 5 with snacks, jardiance 10mg daily  --continue PTA regimen at discharge  --endo follow up already scheduled      HTN  Continue PTA lisinopril      PERLA  Continue Lexapro 20mg daily      HLD  LDL is 11. statin dose reduced as above      Hx cocaine abuse  Denies use in several months     Patient was discussed with Dr. Juarez who agrees with the above plan     Jennifer Sharma PA-C, CATIE    Significant Results and Procedures   See below       Code Status   Full Code       Primary Care Physician   Kendal Wong    Physical Exam                      Vitals:    07/31/22 2049 08/01/22 0626   Weight: 98.6 kg (217 lb 6.4 oz) 100.2 kg (221 lb)     Vital Signs with Ranges     No intake/output data recorded.    See physical exam from progress note     Discharge Disposition   Discharged to home  Condition at discharge: Stable    Consultations This Hospital Stay   PATIENT LEARNING CENTER IP CONSULT  NEUROLOGY IP STROKE CONSULT  SPEECH LANGUAGE PATH ADULT IP CONSULT  PHARMACY IP CONSULT  PHARMACY IP CONSULT  PHARMACY IP CONSULT  PHYSICAL THERAPY ADULT IP CONSULT  OCCUPATIONAL THERAPY ADULT IP CONSULT  REHAB ADMISSIONS LIAISON IP CONSULT  CARE MANAGEMENT / SOCIAL WORK IP CONSULT  SMOKING CESSATION PROGRAM IP CONSULT    Time Spent on this Encounter   I, Jennifer Sharma PA-C, personally saw the patient today and spent greater than 30 minutes discharging this patient.    Discharge Orders      Reason for your hospital stay    You were here for evaluation following a spell of instability, difficulty with speech,  Sometimes I get dizzy when my sugar is low. Check it twice a day. Going down the sairs and I think I tripped. 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 Can drive 4 - 5 hours one way and then come back. History of Present Injury/Illness (Reason for Referral):    Past Medical History/Comorbidities:   Mr. Mireya Charles  has a past medical history of Abdominal distension (11/5/2013); Acute gout (11/5/2013); Anemia; Anxiety (5/1/2013); Anxiety and depression (5/1/2013); BPH (benign prostatic hyperplasia) (11/5/2013); Bronchitis (11/5/2013); CAD (coronary artery disease) (11/5/2013); CAD (coronary artery disease) (11/5/2013); Carcinoma, lung ([facility] (8/10/2015); Chronic kidney disease; CKD (chronic kidney disease) (11/5/2013); Conjunctivitis (11/5/2013); DEMENTIA; Depression; Diabetes ([facility] Diabetes mellitus ([facility] (5/1/2013); DJD (degenerative joint disease) (11/5/2013); Fatty liver (11/7/2013); Fatty liver (11/7/2013); Gall stone (11/5/2013); GERD (gastroesophageal reflux disease) (11/5/2013); GERD (gastroesophageal reflux disease) (11/5/2013); Hepatomegaly (11/5/2013); Hepatomegaly (11/5/2013); Hepatomegaly (11/5/2013); History of GI tumor (11/5/2013); Hypercholesterolemia; Hypertension; Hypoglycemia (11/5/2013); Hypoglycemia (11/5/2013); Insomnia (11/5/2013); Noncompliance (11/5/2013); PAD (peripheral artery disease) ([address] (11/5/2013); Persistent disorder of initiating or maintaining sleep (2/10/2015); Psychiatric disorder; and Urinary frequency (11/5/2013). He also has no past medical history of Asthma; Autoimmune disease ([address] Chronic obstructive pulmonary disease ([facility] Congestive heart failure, unspecified; COPD; Heart failure ([facility] Other ill-defined conditions(799.89); PUD (peptic ulcer disease); Seizures ([facility] Stroke [facility]); Thromboembolus ([facility] or Thyroid disease.   Mr. Mireya Charles  has a past surgical history that and abnormal movement     Follow-up and recommended labs and tests     Follow up with Neurology in one month as planned.     Follow up with primary care provider for ongoing diabetes management     Activity    Your activity upon discharge: activity as tolerated     Discharge Instructions    Make sure to drink plenty of fluids to stay hydrated.   Wear compression stockings during the day. This helps prevent fluid from pooling in your legs and increase blood flow to your brain when you stand  Add plavix back to your regimen for four weeks until you follow up with Neurology   Continue outpatient therapies     Diet    Follow this diet upon discharge: Orders Placed This Encounter      Regular Diet Adult     Discharge Medications   Discharge Medication List as of 8/1/2022  6:24 PM      START taking these medications    Details   clopidogrel (PLAVIX) 75 MG tablet 1 tablet (75 mg) by Oral or NG Tube route daily, Disp-30 tablet, R-0, E-Prescribe         CONTINUE these medications which have CHANGED    Details   rosuvastatin (CRESTOR) 40 MG tablet Take 0.5 tablets (20 mg) by mouth daily, Disp-90 tablet, R-3, Historical         CONTINUE these medications which have NOT CHANGED    Details   aspirin (ASA) 325 MG EC tablet Take 1 tablet (325 mg) by mouth daily, Disp-90 tablet, R-0, E-Prescribe      clotrimazole (LOTRIMIN) 1 % external cream Apply topically 2 times dailyDisp-30 g, [id number]      empagliflozin (JARDIANCE) 10 MG TABS tablet Take 1 tablet (10 mg) by mouth daily, Disp-30 tablet, R-11, E-Prescribe      escitalopram (LEXAPRO) 20 MG tablet Take 1 tablet (20 mg) by mouth daily, Disp-90 tablet, R-3, E-Prescribe      lisinopril (ZESTRIL) 20 MG tablet Take 1 tablet (20 mg) by mouth daily, Historical      insulin aspart (NOVOLOG FLEXPEN) 100 UNIT/ML pen Inject 5 Units Subcutaneous Take with snacks or supplements for other (With small meals), Disp-15 mL, R-0, Local Print      insulin aspart (NOVOLOG PEN) 100 UNIT/ML pen  Inject 10 Units Subcutaneous as needed for other (With large meals), Disp-15 mL, R-0, Local Print      Insulin Glargine-yfgn (SEMGLEE, YFGN,) 100 UNIT/ML SOPN Inject 46 Units Subcutaneous daily, Disp-30 mL, R-3, E-Prescribe      insulin pen needle (BD MARIE U/F) 32G X 4 MM miscellaneous USE THREE PEN NEEDLES DAILY AS DIRECTEDDisp-300 each, [id number]      alcohol swab prep pads Use to swab area of injection/ian as directedDisp-100 each, [id number]      blood glucose (NO BRAND SPECIFIED) test strip Use to test blood sugar 3 times daily or as directed. To accompany: Blood Glucose Monitor Brands: per insurance., Disp-300 strip, R-1, E-Prescribe      blood glucose calibration (NO BRAND SPECIFIED) solution Use to calibrate blood glucose monitor as needed as directed. To accompany: Blood Glucose Monitor Brands: per insurance., Disp-1 Bottle, R-3, E-Prescribe      Continuous Blood Gluc  (FREESTYLE MYLES 2 READER) LEANN 1 Device continuous prn (replace annually if needed) Use to read blood glucose levels per 's instructions., Disp-1 each, R-0, E-Prescribe      Continuous Blood Gluc Sensor (FREESTYLE MYLES 2 SENSOR) MISC 1 Device continuous prn (Change every 14 days) For use with Freestyle Myles 2  for continuous monitoring of blood glucose levels.  Change sensor every 14 days., Disp-2 each, R-5, E-Prescribe      ORDER FOR DME Auto-CPAP: Max 12 cm H2O Min 10 cm H2O  Continuous Lifetime need and heated humidity.   Disp-1 Device, R-0, Sleep Center      thin (NO BRAND SPECIFIED) lancets Use to test blood sugar 3 times daily or as directed. To accompany: Blood Glucose Monitor Brands: per insurance., Disp-200 each, R-6, E-Prescribe           Allergies   Allergies   Allergen Reactions     Augmentin Diarrhea     Sulfa Drugs      Unknown - reaction occurred as a child     Victoza      Skin rash     Ozempic (0.25 Or 0.5 Mg-Dose) [Semaglutide] Diarrhea     Data   Most Recent 3 CBC's:Recent Labs  includes colonoscopy; abdomen surgery proc unlisted; and vascular surgery procedure unlist (Right, 2/3/15). Social History/Living Environment:       Social History     Social History    Marital status:      Spouse name: N/A    Number of children: N/A    Years of education: N/A     Occupational History    Not on file. 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 , Disp: 90 Tab, Rfl: 3    furosemide (LASIX) 40 mg tablet, One daily, Disp: 90 Tab, Rfl: 3    potassium chloride (KLOR-CON M20) 20 mEq tablet, Take 1 Tab by mouth daily. , Disp: 90 Tab, Rfl: 4    buPROPion XL (WELLBUTRIN XL) 300 mg XL tablet, Take 1 Tab by mouth daily. , Disp: 90 Tab, Rfl: 3    doxepin (SINEQUAN) 75 mg capsule, Take 1 Cap by mouth nightly., Disp: 90 Cap, Rfl: 3    Ranolazine 1,000 mg Tb12, Take 1,000 mg by mouth two (2) times a day., Disp: , Rfl:     pantoprazole (PROTONIX) 40 mg tablet, TAKE ONE (1) TABLET(S) ONCE DAILY, Disp: 90 Tab,   Lab Test 08/01/22  0636 07/31/22  1458 04/17/22  1304   WBC 7.8 10.8 8.7   HGB 14.3 15.6 13.4   MCV 82 86 88    550* 469*      Most Recent 3 BMP's:  Recent Labs   Lab Test 08/01/22  1755 08/01/22  1131 08/01/22  0754 08/01/22  0636 07/31/22  1502 07/31/22  1458 04/17/22  1429 04/17/22  1304   NA  --   --   --  134  --  134  --  135   POTASSIUM  --   --   --  4.4  --  4.2  --  3.9   CHLORIDE  --   --   --  105  --  101  --  104   CO2  --   --   --  23  --  27  --  25   BUN  --   --   --  21  --  19  --  10   CR  --   --   --  1.16  --  1.25  --  1.04   ANIONGAP  --   --   --  6  --  6  --  6   EVER  --   --   --  9.5  --  9.5  --  8.7   * 177* 211* 196*   < > 213*   < > 259*    < > = values in this interval not displayed.     Most Recent 2 LFT's:  Recent Labs   Lab Test 03/24/22  1438 03/16/22  1704   AST 10 11   ALT 17 17   ALKPHOS 99 70   BILITOTAL 0.2 0.4     Most Recent INR's and Anticoagulation Dosing History:  Anticoagulation Dose History     Recent Dosing and Labs Latest Ref Rng & Units 3/16/2022 7/31/2022    INR 0.85 - 1.15 1.02 1.02        Most Recent 3 Troponin's:No lab results found.  Most Recent Cholesterol Panel:  Recent Labs   Lab Test 07/31/22  1458   CHOL 112   LDL 11   HDL 36*   TRIG 325*     Most Recent 6 Bacteria Isolates From Any Culture (See EPIC Reports for Culture Details):No lab results found.  Most Recent TSH, T4 and A1c Labs:  Recent Labs   Lab Test 07/31/22  1458 03/24/17  1143 12/05/16  1058   TSH  --   --  1.94   A1C 8.5*   < > 8.8*    < > = values in this interval not displayed.     Results for orders placed or performed during the hospital encounter of 07/31/22   CT Head w/o Contrast    Narrative    EXAM: CT HEAD W/O CONTRAST, CTA HEAD NECK W CONTRAST  LOCATION: [facility]  DATE/TIME: 7/31/2022 3:07 PM    INDICATION: Dizziness, facial droop, aphasia.  COMPARISON: 04/26/2022  CONTRAST: 76mL Isovue 370  TECHNIQUE: Head and neck CT angiogram with IV  Rfl: 3    insulin glargine (LANTUS) 100 unit/mL injection, by SubCUTAneous route nightly., Disp: , Rfl:     insulin lispro (HUMALOG) 100 unit/mL injection, by SubCUTAneous route., Disp: , Rfl:     cpap machine kit, by Does Not Apply route. autopap 5-20, Disp: , Rfl:     hydrALAZINE (APRESOLINE) 25 mg tablet, Take 25 mg by mouth three (3) times daily. , Disp: , Rfl:     PROAIR HFA 90 mcg/actuation inhaler, , Disp: , Rfl:     ONE TOUCH ULTRA TEST strip, , Disp: , Rfl:     SPIRIVA WITH HANDIHALER 18 mcg inhalation capsule, , Disp: , Rfl:     losartan (COZAAR) 100 mg tablet, Take 100 mg by mouth daily. , Disp: , Rfl:     NEEDLES, INSULIN DISPOSABLE (BD ULTRA-FINE JOSE PEN NEEDLES), by Does Not Apply route., Disp: , Rfl:     nitroglycerin (NITROSTAT) 0.4 mg SL tablet, by SubLINGual route every five (5) minutes as needed for Chest Pain., Disp: , Rfl:     isosorbide mononitrate ER (IMDUR) 30 mg tablet, Take 120 mg by mouth daily. , Disp: , Rfl:     tamsulosin (FLOMAX) 0.4 mg capsule, Take 0.4 mg by mouth daily. , Disp: , Rfl:     ASPIR-81 81 mg TbEC, take 81 mg by mouth daily. , Disp: , Rfl:    Date Last Reviewed:  2/13/2017     Number of Personal Factors/Comorbidities that affect the Plan of Care: 3+: HIGH COMPLEXITY   EXAMINATION:   ROM:          WNL except some tightness in calves right greater than left  Strength:          Good except decreased power in left DF. Functional Mobility:         Gait/Ambulation:  Pt ambulates without assistive device and self reports only about 50 - 100' (will try a 6 mintue walk test). Pt shows bilateral heel scuff with small steps (decreased hip and core strength). Transfers:  independent        Bed Mobility:  NT  Mental Status:          Alert and oriented x 4. Occ decreased command following and motor processing. Impulsive. Vision:          Can see to drive without correction in the day. Getting glasses for his night vision.     Body Structures Involved:  1. Nerves  2. Heart  3. Lungs  4. Bones  5. Joints  6. Muscles  7. Ligaments Body Functions Affected:  1. Mental  2. Sensory/Pain  3. Cardio  4. Respiratory  5. Neuromusculoskeletal  6. Movement Related Activities and Participation Affected:  1. Learning and Applying Knowledge  2. General Tasks and Demands  3. Mobility  4. Self Care  5. Domestic Life  6. Interpersonal Interactions and Relationships  7. Community, Social and [place]   Number of elements that affect the Plan of Care: 4+: HIGH COMPLEXITY   CLINICAL PRESENTATION:   Presentation: Evolving clinical presentation with unstable and unpredictable characteristics: HIGH COMPLEXITY   CLINICAL DECISION MAKING:   Outcome Measure: Tool Used: 6-Minute Walk Test   Score:  Initial: 495 Feet = TBD Meters  3 seated rest breaks  Most Recent: TBD Feet = TBD Meters    Interpretation of Score: AGE  GENDER  MEAN  SD  NORMAL RANGE (2SD)    61-76  Male (15)   Female (22)  [id number]  80   80  1-0   [phone]    66-77  Male (14)   Female (22)  527   471  80   69  46-65   321-621    80-80  Male (8)   Female (49)  579   808  24   88  663-748   222-562        Tool Used: [place] Balance Scale  Score:  Initial: 41/56    Interpretation of Score: Each section is scored on a 0-4 scale, 0 representing the patients inability to perform the task and 4 representing independence. The scores of each section are added together for a total score of 56. The higher the patients score, the more independent the patient is. Any score below 45 indicates increased risk for falls. Score 56 55-45 44-34 33-23 22-12 11-1 0   Modifier CH CI CJ CK CL CM CN     ?  Mobility - Walking and Moving Around:     - CURRENT STATUS: CJ - 20%-39% impaired, limited or restricted    - GOAL STATUS: CI - 1%-19% impaired, limited or restricted    - D/C STATUS:  ---------------To be determined---------------    Tool Used: Dynamic Gait Index  Score:  Initial: 16/24    Interpretation contrast. Noncontrast head CT followed by axial helical CT images of the head and neck vessels obtained during the arterial phase of intravenous contrast administration. Axial 2D reconstructed images and   multiplanar 3D MIP reconstructed images of the head and neck vessels were performed by the technologist. Dose reduction techniques were used. All stenosis measurements made according to NASCET criteria unless otherwise specified.    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 No CT evidence of acute infarct. No acute intracranial hemorrhage or abnormal extra-axial fluid collection. Mild presumed chronic   small vessel ischemic changes. Normal ventricles and sulci.     VISUALIZED ORBITS/SINUSES/MASTOIDS: No intraorbital abnormality. No paranasal sinus mucosal disease. No middle ear or mastoid effusion.    BONES/SOFT TISSUES: No acute abnormality.    HEAD CTA:  ANTERIOR CIRCULATION: Severe stenosis of the distal M1 left middle cerebral artery, worse compared to 04/26/2022. No additional high-grade spinal canal or neural foraminal stenosis. Standard [place] anatomy.    POSTERIOR CIRCULATION: No stenosis/occlusion, aneurysm, or high flow vascular malformation. Dominant left and smaller right vertebral artery contribute to a normal basilar artery.     DURAL VENOUS SINUSES: Not well evaluated on a technical basis.    NECK CTA:  RIGHT CAROTID: No measurable stenosis or dissection.    LEFT CAROTID: No measurable stenosis or dissection.    VERTEBRAL ARTERIES: No focal stenosis or dissection. Dominant left and smaller right vertebral arteries.    AORTIC ARCH: Classic aortic arch anatomy with no significant stenosis at the origin of the great vessels.    NONVASCULAR STRUCTURES: No acute or aggressive abnormality.      Impression    IMPRESSION:   HEAD CT:  1.  No acute intracranial abnormality.  2.  Old  infarctions in the left MCA territory and left MI-MCA watershed territory.    HEAD CTA:   1.  Worsened, now severe stenosis of the distal M1 left middle cerebral artery.  2.  No additional high-grade stenosis or occlusion.  3.  No aneurysm or high flow vascular malformation.    NECK CTA:  1.  No significant stenosis. No dissection.    Findings discussed with Dr. Jameel Martin at 1521 hours on 07/31/2022.   CTA Head Neck w Contrast    Narrative    EXAM: CT HEAD W/O CONTRAST, CTA HEAD NECK W CONTRAST  LOCATION: [facility]  DATE/TIME: 7/31/2022 3:07 PM    INDICATION: Dizziness, facial droop, aphasia.  COMPARISON: 04/26/2022  CONTRAST: 76mL Isovue 370  TECHNIQUE: Head and neck CT angiogram with IV contrast. Noncontrast head CT followed by axial helical CT images of the head and neck vessels obtained during the arterial phase of intravenous contrast administration. Axial 2D reconstructed images and   multiplanar 3D MIP reconstructed images of the head and neck vessels were performed by the technologist. Dose reduction techniques were used. All stenosis measurements made according to NASCET criteria unless otherwise specified.    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 No CT evidence of acute infarct. No acute intracranial hemorrhage or abnormal extra-axial fluid collection. Mild presumed chronic   small vessel ischemic changes. Normal ventricles and sulci.     VISUALIZED ORBITS/SINUSES/MASTOIDS: No intraorbital abnormality. No paranasal sinus mucosal disease. No middle ear or mastoid effusion.    BONES/SOFT TISSUES: No acute abnormality.    HEAD CTA:  ANTERIOR CIRCULATION: Severe stenosis of the distal M1 left middle cerebral artery, worse compared to 04/26/2022. No additional high-grade spinal canal or neural foraminal stenosis. Standard [place] anatomy.    POSTERIOR  of Score: Each section is scored on a 0-3 scale, 0 representing the patients inability to perform the task and 3 representing independence. The scores of each section are added together for a total score of 24. Any score below 19 indicates increased risk for falls. Score 24 23-19 18-15 14-10 9-5 4-1 0   Modifier CH CI CJ CK CL CM CN     Tool Used: Timed Up and Go (TUG)  Score:  Initial: 12.65 seconds    Interpretation of Score: The test measures, in seconds, the time taken by an individual to stand up from a standard arm chair (seat height 46 cm [18 in], arm height 65 cm [25.6 in]), walk a distance of 3 meters (118 in, approx 10 ft), turn, walk back to the chair and sit down. If the individual takes longer than 14 seconds to complete TUG, this indicates risk for falls. Score 7 7.5-10.5 11-14 14.5-17.5 18-21 21.5-24.5 25+   Modifier CH CI CJ CK CL CM CN     Medical Necessity:   · Skilled intervention continues to be required due to [facility]. Reason for Services/Other Comments:  · Patient continues to require skilled intervention due to [id number] [address] DEFICIT. Use of outcome tool(s) and clinical judgement create a POC that gives a: Difficult prediction of patient's progress: HIGH COMPLEXITY      S:  \"my blood sugar this morning was 85. \"  No other complaints. TREATMENT:   (In addition to Assessment/Re-Assessment sessions the following treatments were rendered)  Therapeutic Exercise: ( 40 minutes):  Exercises per grid below to improve mobility, strength and balance. Required moderate visual, verbal and tactile cues to promote proper body alignment and promote proper body mechanics. Progressed complexity of movement as indicated. Date:  2/3/17 Date  2/8/17 Date:  2/9/17 2-13-17   Activity/Exercise       Nu Step Arms at 13  Seat at 13  Level 1  Time: 5:00 minutes legs started hurting.   Arms at 13  Seat at 13  Level 1  Time: 5:00 minutes no leg pain  Physiostep   No CIRCULATION: No stenosis/occlusion, aneurysm, or high flow vascular malformation. Dominant left and smaller right vertebral artery contribute to a normal basilar artery.     DURAL VENOUS SINUSES: Not well evaluated on a technical basis.    NECK CTA:  RIGHT CAROTID: No measurable stenosis or dissection.    LEFT CAROTID: No measurable stenosis or dissection.    VERTEBRAL ARTERIES: No focal stenosis or dissection. Dominant left and smaller right vertebral arteries.    AORTIC ARCH: Classic aortic arch anatomy with no significant stenosis at the origin of the great vessels.    NONVASCULAR STRUCTURES: No acute or aggressive abnormality.      Impression    IMPRESSION:   HEAD CT:  1.  No acute intracranial abnormality.  2.  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    HEAD CTA:   1.  Worsened, now severe stenosis of the distal M1 left middle cerebral artery.  2.  No additional high-grade stenosis or occlusion.  3.  No aneurysm or high flow vascular malformation.    NECK CTA:  1.  No significant stenosis. No dissection.    Findings discussed with Dr. Jameel Martin at 1521 hours on 07/31/2022.   MR Brain w/o & w Contrast    Narrative    EXAM: MR BRAIN W/O and W CONTRAST  LOCATION: [facility]  DATE/TIME: 7/31/2022 4:59 PM    INDICATION: Expressive aphasia.  Concern for new CVA  COMPARISON: CTA head and neck of same date. MRI of the brain 02/16/2022.  CONTRAST: 10mL Gadavist  TECHNIQUE: Routine multiplanar multisequence head MRI without and with intravenous contrast.    FINDINGS:  INTRACRANIAL CONTENTS: No definite new acute/early subacute infarcts are identified. There has been expected interval evolution of multiple small previously demonstrated infarcts in the left corona radiata/centrum semiovale and periventricular white   matter compared to most recent study. These now demonstrate a late subacute/early chronic appearance. There has also been continued expected  arms  Level 1  Had to get him to SLOW down  1 resting break   Heel/toe raises x20  x20 X 20   Standing lateral foot raises x20.  x20 X 20    Hip circles Very difficult still for pt. Pt rotate feet and hips vs circling around feet. -    Seated ankle pumps x20  x20 X 20 B   Standing hip extension  2 x 10 with yellow band 20 repetitions with yellow theraband resistance  2 x 10 with yellow band Yellow   2 x 10 B    Standing hip abduction 2 x 10 with yellow band  2 x 10 with yellow band Yellow   2 x 10 B   Single leg stance 3 x 10 seconds. Difficulty mirroring PT or following commands. 3 x 10 seconds each leg 5 x 10 sec hold   Decreasing hand hold to tapping rail   Standing forward/back bend -   X 10 with cues to SLOW down  And CGA   Calf stretch on incline board    5 x 20 sec      Pre-Treatment Symptoms: Doree Janina. reports no pain prior to therapy. Treatment/Session Assessment:  Hero Roa. requires lots of cues to slow down and for technique. He reports he needs lots of rest for a 78year old man. No fatigue noted. Feels he is getting stronger. · Post session pain:  No change in pain level  · Compliance with Program/Exercises: Will assess as treatment progresses. · Recommendations/Intent for next treatment session: \"Next visit will focus on progressive high level balance and LE sensorimotor awareness stretches and exercises. \".   Total Treatment Duration: 40 minutes   PT Patient Time In/Time Out  Time In: 4005  Time Out: Deacon Crawford PTA evolution of a now late subacute/chronic infarct of the left anterolateral temporal lobe, with decreased, minimal   residual patchy cortical/subcortical enhancement in the left temporal region.     The ventricles are normal in size and configuration. No acute intracranial hemorrhage identified. Minimal patchy susceptibility-related signal loss in the left anterolateral temporal lobe infarct bed, which may represent old petechial hemorrhage. Mild   generalized brain parenchymal volume loss. Mild patchy nonspecific T2 FLAIR hyperintense signal in cerebral white matter, likely sequela of chronic small vessel ischemic disease. No intra-axial fluid collection or mass effect/herniation.    SELLA: No abnormality accounting for technique.    OSSEOUS STRUCTURES/SOFT TISSUES: Normal marrow signal. The major intracranial vascular flow voids are maintained.     ORBITS: No abnormality accounting for technique.     SINUSES/MASTOIDS: Mild mucosal thickening primarily in the ethmoid sinuses. No middle ear or mastoid effusion.       Impression    IMPRESSION:  1.  No definite acute intracranial process identified.  2.  Interval expected evolution of late subacute/chronic infarcts in the left corona radiata/centrum semiovale.  3.  Interval expected evolution of late subacute/chronic infarct in the left anterolateral temporal lobe, with minimal chronic petechial hemorrhage in the infarct bed. No significant mass effect.  4.  Brain atrophy and presumed chronic small vessel ischemic changes, as described.   Echocardiogram Complete - For age > 60 yrs     Value    LVEF  60-65%    Narrative    [id number]  [id number]  [id number]^MARLENE^BONI^MOMO     [facility]  Echocardiography Laboratory  [address] [place], [place] [address]     Name: NADIR HOGUEFAREED WELLS  MRN: [id number]  : 1952  Study Date: 2022 01:59 PM  Age: 69 yrs  Gender: Male  Patient Location: [facility]  Reason For Study: Cerebrovascular  Incident  Ordering Physician: BONI ROSARIO  Performed By: Araceli Min     BSA: 2.2 m2  Height: 70 in  Weight: 221 lb  HR: 73  BP: 153/107 mmHg  ______________________________________________________________________________  Procedure  Complete Portable Echo Adult. Optison (NDC #[id number]) given intravenously.  ______________________________________________________________________________  Interpretation Summary     Mild aortic root dilatation.(3.9)  The ascending aorta is Mildly dilated.(3.8)  Otherwise, Normal transthoracic echocardiogram. Compared to the prior study  dated 3/18/22, there have been no changes.  ______________________________________________________________________________  Left Ventricle  The left ventricle is normal in size. There is normal left ventricular wall  thickness. The visual ejection fraction is 60-65%. Normal left ventricular  wall motion.     Right Ventricle  The right ventricle is normal in structure, function and size.     Atria  Normal left atrial size. Right atrial size is normal. Intact atrial septum.     Mitral Valve  The mitral valve is normal in structure and function.     Tricuspid Valve  The tricuspid valve is normal in structure and function. Right ventricular  systolic pressure could not be approximated due to inadequate tricuspid  regurgitation.     Aortic Valve  The aortic valve is normal in structure and function. There is trace aortic  regurgitation.     Pulmonic Valve  The pulmonic valve is not well visualized.     Vessels  Mild aortic root dilatation. The ascending aorta is Mildly dilated. IVC  diameter <2.1 cm collapsing >50% with sniff suggests a normal RA pressure of 3  mmHg.     Pericardium  The pericardium appears normal.     Rhythm  Sinus rhythm was noted.  ______________________________________________________________________________  MMode/2D Measurements & Calculations     IVSd: 1.1 cm  LVIDd: 4.7 cm  LVIDs: 3.1 cm  LVPWd: 1.00 cm  FS: 33.4 %  LV  mass(C)d: 174.5 grams  LV mass(C)dI: 80.1 grams/m2  Ao root diam: 3.9 cm  LA dimension: 4.9 cm  asc Aorta Diam: 3.8 cm  LA/Ao: 1.3  LVOT diam: 2.0 cm  LVOT area: 3.1 cm2  LA Volume (BP): 41.4 ml  LA Volume Index (BP): 19.0 ml/m2  RWT: 0.42     Doppler Measurements & Calculations  MV E max adis: 68.9 cm/sec  MV A max adis: 78.1 cm/sec  MV E/A: 0.88  MV dec slope: 310.5 cm/sec2  MV dec time: 0.22 sec  PA acc time: 0.15 sec  E/E' av.7  Lateral E/e': 7.1  Medial E/e': 10.3     ______________________________________________________________________________  Report approved by: Peri Kim 2022 04:28 PM

## (undated) DEVICE — COOLIEF* COOLED RADIOFREQUENCY PROBE KIT: Brand: COOLIEF* COOLED RADIOFREQUENCY KIT

## (undated) DEVICE — SET IV ADMIN L55IN ID0054IN EXTN MICBOR TBNG W

## (undated) DEVICE — SYR 10ML LUER LOK 1/5ML GRAD --

## (undated) DEVICE — DRAPE SHT 3 QTR PROXIMA 53X77 --

## (undated) DEVICE — Device

## (undated) DEVICE — ELECTRODE ES AD DISPER HYDRGEL THN FOAM ADH SCALLOPED EDGE

## (undated) DEVICE — DRAPE TWL SURG 16X26IN BLU ORB04] ALLCARE INC]

## (undated) DEVICE — TRAY EPI PERIFIX CONT 17GAX3.5IN TUOHY 19GA SPRINGWOUND OPN